# Patient Record
Sex: FEMALE | Race: WHITE | NOT HISPANIC OR LATINO | Employment: OTHER | URBAN - METROPOLITAN AREA
[De-identification: names, ages, dates, MRNs, and addresses within clinical notes are randomized per-mention and may not be internally consistent; named-entity substitution may affect disease eponyms.]

---

## 2017-04-04 ENCOUNTER — TELEPHONE (OUTPATIENT)
Dept: INTERNAL MEDICINE | Facility: CLINIC | Age: 67
End: 2017-04-04

## 2017-04-04 NOTE — TELEPHONE ENCOUNTER
4/4/2017    Call Regarding Preventive Health Screening Colonoscopy, LDL and Physical    Attempt 1    Message on voicemail     Comments:       Outreach   KV

## 2017-05-15 NOTE — TELEPHONE ENCOUNTER
5/15/2017    Call Regarding Preventive Health Screening Colonoscopy, Cervical/PAP and Physical    Attempt 2    Message on voicemail     Comments:           Outreach   DHARMESH

## 2017-05-25 NOTE — TELEPHONE ENCOUNTER
Call Regarding Preventive Health Screening Colonoscopy and physical    Attempt 3    Message on voicemail     Comments:       Outreach   Rafia Wu

## 2019-04-29 ENCOUNTER — OFFICE VISIT (OUTPATIENT)
Dept: PEDIATRICS | Facility: CLINIC | Age: 69
End: 2019-04-29
Payer: COMMERCIAL

## 2019-04-29 VITALS
OXYGEN SATURATION: 100 % | SYSTOLIC BLOOD PRESSURE: 104 MMHG | WEIGHT: 146.2 LBS | HEART RATE: 82 BPM | HEIGHT: 68 IN | BODY MASS INDEX: 22.16 KG/M2 | TEMPERATURE: 97.6 F | DIASTOLIC BLOOD PRESSURE: 76 MMHG

## 2019-04-29 DIAGNOSIS — E11.8 TYPE 2 DIABETES MELLITUS WITH COMPLICATION, WITHOUT LONG-TERM CURRENT USE OF INSULIN (H): Primary | ICD-10-CM

## 2019-04-29 DIAGNOSIS — Z78.0 ASYMPTOMATIC POSTMENOPAUSAL STATUS: ICD-10-CM

## 2019-04-29 DIAGNOSIS — Z12.31 VISIT FOR SCREENING MAMMOGRAM: ICD-10-CM

## 2019-04-29 DIAGNOSIS — Z13.89 SCREENING FOR DIABETIC PERIPHERAL NEUROPATHY: ICD-10-CM

## 2019-04-29 DIAGNOSIS — N89.8 VAGINAL DISCHARGE: ICD-10-CM

## 2019-04-29 DIAGNOSIS — Z12.11 SCREEN FOR COLON CANCER: ICD-10-CM

## 2019-04-29 DIAGNOSIS — N95.2 ATROPHIC VAGINITIS: ICD-10-CM

## 2019-04-29 DIAGNOSIS — Z11.59 NEED FOR HEPATITIS C SCREENING TEST: ICD-10-CM

## 2019-04-29 LAB
ALBUMIN SERPL-MCNC: 3.9 G/DL (ref 3.4–5)
ALBUMIN UR-MCNC: NEGATIVE MG/DL
ALP SERPL-CCNC: 71 U/L (ref 40–150)
ALT SERPL W P-5'-P-CCNC: 16 U/L (ref 0–50)
ANION GAP SERPL CALCULATED.3IONS-SCNC: 10 MMOL/L (ref 3–14)
APPEARANCE UR: CLEAR
AST SERPL W P-5'-P-CCNC: 11 U/L (ref 0–45)
BILIRUB SERPL-MCNC: 0.8 MG/DL (ref 0.2–1.3)
BILIRUB UR QL STRIP: NEGATIVE
BUN SERPL-MCNC: 13 MG/DL (ref 7–30)
CALCIUM SERPL-MCNC: 9.5 MG/DL (ref 8.5–10.1)
CHLORIDE SERPL-SCNC: 103 MMOL/L (ref 94–109)
CHOLEST SERPL-MCNC: 256 MG/DL
CO2 SERPL-SCNC: 23 MMOL/L (ref 20–32)
COLOR UR AUTO: YELLOW
CREAT SERPL-MCNC: 0.51 MG/DL (ref 0.52–1.04)
CREAT UR-MCNC: 54 MG/DL
GFR SERPL CREATININE-BSD FRML MDRD: >90 ML/MIN/{1.73_M2}
GLUCOSE SERPL-MCNC: 337 MG/DL (ref 70–99)
GLUCOSE UR STRIP-MCNC: 500 MG/DL
HBA1C MFR BLD: 14.5 % (ref 0–5.6)
HDLC SERPL-MCNC: 44 MG/DL
HGB UR QL STRIP: NEGATIVE
KETONES UR STRIP-MCNC: >160 MG/DL
LDLC SERPL CALC-MCNC: 171 MG/DL
LEUKOCYTE ESTERASE UR QL STRIP: NEGATIVE
MICROALBUMIN UR-MCNC: 13 MG/L
MICROALBUMIN/CREAT UR: 23.12 MG/G CR (ref 0–25)
NITRATE UR QL: NEGATIVE
NONHDLC SERPL-MCNC: 212 MG/DL
PH UR STRIP: 5 PH (ref 5–7)
POTASSIUM SERPL-SCNC: 4 MMOL/L (ref 3.4–5.3)
PROT SERPL-MCNC: 7.7 G/DL (ref 6.8–8.8)
SODIUM SERPL-SCNC: 136 MMOL/L (ref 133–144)
SOURCE: ABNORMAL
SP GR UR STRIP: 1.01 (ref 1–1.03)
SPECIMEN SOURCE: NORMAL
TRIGL SERPL-MCNC: 206 MG/DL
TSH SERPL DL<=0.005 MIU/L-ACNC: 1.95 MU/L (ref 0.4–4)
UROBILINOGEN UR STRIP-ACNC: 0.2 EU/DL (ref 0.2–1)
WET PREP SPEC: NORMAL

## 2019-04-29 PROCEDURE — 86803 HEPATITIS C AB TEST: CPT | Performed by: INTERNAL MEDICINE

## 2019-04-29 PROCEDURE — 99207 C FOOT EXAM  NO CHARGE: CPT | Performed by: INTERNAL MEDICINE

## 2019-04-29 PROCEDURE — G0439 PPPS, SUBSEQ VISIT: HCPCS | Performed by: INTERNAL MEDICINE

## 2019-04-29 PROCEDURE — 82043 UR ALBUMIN QUANTITATIVE: CPT | Performed by: INTERNAL MEDICINE

## 2019-04-29 PROCEDURE — 83036 HEMOGLOBIN GLYCOSYLATED A1C: CPT | Performed by: INTERNAL MEDICINE

## 2019-04-29 PROCEDURE — 80061 LIPID PANEL: CPT | Performed by: INTERNAL MEDICINE

## 2019-04-29 PROCEDURE — 80053 COMPREHEN METABOLIC PANEL: CPT | Performed by: INTERNAL MEDICINE

## 2019-04-29 PROCEDURE — 81003 URINALYSIS AUTO W/O SCOPE: CPT | Performed by: INTERNAL MEDICINE

## 2019-04-29 PROCEDURE — 87210 SMEAR WET MOUNT SALINE/INK: CPT | Performed by: INTERNAL MEDICINE

## 2019-04-29 PROCEDURE — 84443 ASSAY THYROID STIM HORMONE: CPT | Performed by: INTERNAL MEDICINE

## 2019-04-29 PROCEDURE — 36415 COLL VENOUS BLD VENIPUNCTURE: CPT | Performed by: INTERNAL MEDICINE

## 2019-04-29 PROCEDURE — G0009 ADMIN PNEUMOCOCCAL VACCINE: HCPCS | Performed by: INTERNAL MEDICINE

## 2019-04-29 PROCEDURE — 90670 PCV13 VACCINE IM: CPT | Performed by: INTERNAL MEDICINE

## 2019-04-29 ASSESSMENT — ENCOUNTER SYMPTOMS
PALPITATIONS: 0
PARESTHESIAS: 0
HEMATOCHEZIA: 0
DIARRHEA: 0
SORE THROAT: 0
ARTHRALGIAS: 0
CONSTIPATION: 1
BREAST MASS: 0
FEVER: 0
DIZZINESS: 0
COUGH: 0
JOINT SWELLING: 0
FREQUENCY: 1
ABDOMINAL PAIN: 0
HEARTBURN: 0
DYSURIA: 0
HEMATURIA: 0
NERVOUS/ANXIOUS: 0
MYALGIAS: 1
NAUSEA: 0
WEAKNESS: 0
EYE PAIN: 1
SHORTNESS OF BREATH: 0
CHILLS: 0
HEADACHES: 0

## 2019-04-29 ASSESSMENT — ACTIVITIES OF DAILY LIVING (ADL): CURRENT_FUNCTION: NO ASSISTANCE NEEDED

## 2019-04-29 ASSESSMENT — MIFFLIN-ST. JEOR: SCORE: 1238.72

## 2019-04-29 NOTE — LETTER
[facility]  [address]  [place] [address]                  [phone]   April 30, 2019    Jennifer Presley  [address] [place] [address]      Jennifer,     Your hep C, your thyroid stimulating hormone (TSH), and your electrolytes, liver, and kidney panels looked stable.     But we do need to discuss your diabetes mellitus, as was previously indicated; please set up a follow up appointment for this ASAP.     Hope you're well.     Andrez Soares MD   Internal Medicine and Pediatrics         Results for orders placed or performed in visit on 04/29/19   Hepatitis C Screen Reflex to HCV RNA Quant and Genotype   Result Value Ref Range    Hepatitis C Antibody Nonreactive NR^Nonreactive   COMPREHENSIVE METABOLIC PANEL   Result Value Ref Range    Sodium 136 133 - 144 mmol/L    Potassium 4.0 3.4 - 5.3 mmol/L    Chloride 103 94 - 109 mmol/L    Carbon Dioxide 23 20 - 32 mmol/L    Anion Gap 10 3 - 14 mmol/L    Glucose 337 (H) 70 - 99 mg/dL    Urea Nitrogen 13 7 - 30 mg/dL    Creatinine 0.51 (L) 0.52 - 1.04 mg/dL    GFR Estimate >90 >60 mL/min/[1.73_m2]    GFR Estimate If Black >90 >60 mL/min/[1.73_m2]    Calcium 9.5 8.5 - 10.1 mg/dL    Bilirubin Total 0.8 0.2 - 1.3 mg/dL    Albumin 3.9 3.4 - 5.0 g/dL    Protein Total 7.7 6.8 - 8.8 g/dL    Alkaline Phosphatase 71 40 - 150 U/L    ALT 16 0 - 50 U/L    AST 11 0 - 45 U/L   HEMOGLOBIN A1C   Result Value Ref Range    Hemoglobin A1C 14.5 (H) 0 - 5.6 %   Lipid panel reflex to direct LDL Fasting   Result Value Ref Range    Cholesterol 256 (H) <200 mg/dL    Triglycerides 206 (H) <150 mg/dL    HDL Cholesterol 44 (L) >49 mg/dL    LDL Cholesterol Calculated 171 (H) <100 mg/dL    Non HDL Cholesterol 212 (H) <130 mg/dL   Albumin Random Urine Quantitative with Creat Ratio   Result Value Ref Range    Creatinine Urine 54 mg/dL    Albumin Urine mg/L 13 mg/L    Albumin Urine mg/g Cr 23.12 0 - 25 mg/g Cr   TSH WITH FREE T4 REFLEX   Result  Value Ref Range    TSH 1.95 0.40 - 4.00 mU/L   *UA reflex to Microscopic and Culture ([place] [facility] (except [place] and [place])   Result Value Ref Range    Color Urine Yellow     Appearance Urine Clear     Glucose Urine 500 (A) NEG^Negative mg/dL    Bilirubin Urine Negative NEG^Negative    Ketones Urine >160 (A) NEG^Negative mg/dL    Specific Gravity Urine 1.010 1.003 - 1.035    Blood Urine Negative NEG^Negative    pH Urine 5.0 5.0 - 7.0 pH    Protein Albumin Urine Negative NEG^Negative mg/dL    Urobilinogen Urine 0.2 0.2 - 1.0 EU/dL    Nitrite Urine Negative NEG^Negative    Leukocyte Esterase Urine Negative NEG^Negative    Source Midstream Urine    Wet prep   Result Value Ref Range    Specimen Description Vagina     Wet Prep No Trichomonas seen     Wet Prep No yeast seen     Wet Prep No clue cells seen     Wet Prep Many  WBC'S seen

## 2019-04-29 NOTE — LETTER
[facility]  [address]  [place] [address]                  [phone]   April 29, 2019    Jennifer Presley  [address] [address]      Jennifer,     Your diabetes blood test (A1c) today was 14.5, which is very, very high.  I am quite sure that this is the reason for your weight loss, and we'll need to come up with a plan together next week as to how to approach this.       Besides diet and exercise, there are some good educational folks we can have you see, and some other medicines we might consider.     Hope you're well.     Andrez Soares MD   Internal Medicine and Pediatrics        Results for orders placed or performed in visit on 04/29/19   HEMOGLOBIN A1C   Result Value Ref Range    Hemoglobin A1C 14.5 (H) 0 - 5.6 %   Wet prep   Result Value Ref Range    Specimen Description Vagina     Wet Prep No Trichomonas seen     Wet Prep No yeast seen     Wet Prep No clue cells seen     Wet Prep Many  WBC'S seen

## 2019-04-29 NOTE — PROGRESS NOTES
"SUBJECTIVE:   Jennifer Presley is a 68 year old female who presents for Preventive Visit.    Are you in the first 12 months of your Medicare coverage?  No    Healthy Habits:     In general, how would you rate your overall health?  Good    Frequency of exercise:  1 day/week    Duration of exercise:  Less than 15 minutes    Do you usually eat at least 4 servings of fruit and vegetables a day, include whole grains    & fiber and avoid regularly eating high fat or \"junk\" foods?  No    Taking medications regularly:  Not Applicable    Medication side effects:  Not applicable    Ability to successfully perform activities of daily living:  No assistance needed    Home Safety:  No safety concerns identified    Hearing Impairment:  No hearing concerns    In the past 6 months, have you been bothered by leaking of urine?  No    In general, how would you rate your overall mental or emotional health?  Good      PHQ-2 Total Score: 0    Additional concerns today:  Yes (neuropathy)    Do you feel safe in your environment? Yes    Do you have a Health Care Directive? No: Advance care planning reviewed with patient; information given to patient to review.      Fall risk  Fallen 2 or more times in the past year?: No  Any fall with injury in the past year?: No    Cognitive Screening   1) Repeat 3 items (Leader, Season, Table)    2) Clock draw: NORMAL  3) 3 item recall: Recalls 3 objects  Results: 3 items recalled: COGNITIVE IMPAIRMENT LESS LIKELY    Mini-CogTM Copyright PATRICIA Kaur. Licensed by the author for use in [facility]; reprinted with permission (adan@.[email or web address]). All rights reserved.      Do you have sleep apnea, excessive snoring or daytime drowsiness?: no    Reviewed and updated as needed this visit by clinical staff  Tobacco  Allergies  Meds  Med Hx  Surg Hx  Fam Hx  Soc Hx        Reviewed and updated as needed this visit by Provider        Social History     Tobacco Use     Smoking status: Never Smoker     " Smokeless tobacco: Never Used   Substance Use Topics     Alcohol use: Yes     Alcohol/week: 1.0 oz         Alcohol Use 4/29/2019   Prescreen: >3 drinks/day or >7 drinks/week? No   Prescreen: >3 drinks/day or >7 drinks/week? -     Patient would like to establish care.    Current providers sharing in care for this patient include:   Patient Care Team:  Andrez Soares MD as PCP - General (Internal Medicine)  Andrez Soares MD (Internal Medicine)    The following health maintenance items are reviewed in Epic and correct as of today:  Health Maintenance   Topic Date Due     HEPATITIS C SCREENING  10/04/1969     ZOSTER IMMUNIZATION (1 of 2) 10/04/2001     MAMMO SCREEN Q2 YR (SYSTEM ASSIGNED)  01/04/2010     EYE EXAM Q1 YEAR  12/01/2015     A1C Q3 MO  12/21/2015     FOOT EXAM Q1 YEAR  09/21/2016     CMP Q1 YR  09/21/2016     LIPID MONITORING Q1 YEAR  09/21/2016     MICROALBUMIN Q1 YEAR  09/21/2016     MEDICARE ANNUAL WELLNESS VISIT  10/04/2016     FALL RISK ASSESSMENT  10/04/2016     DEXA SCAN SCREENING (SYSTEM ASSIGNED)  10/04/2016     TSH W/ FREE T4 REFLEX Q2 YEAR  09/21/2017     PAP Q3 YR  04/22/2018     COLONOSCOPY Q3 YR  10/26/2018     PHQ-2  01/01/2019     ADVANCE DIRECTIVE PLANNING Q5 YRS  06/10/2020     DTAP/TDAP/TD IMMUNIZATION (2 - Td) 06/09/2021     MIGRAINE ACTION PLAN  Completed     INFLUENZA VACCINE  Completed     IPV IMMUNIZATION  Aged Out     MENINGITIS IMMUNIZATION  Aged Out     Has been off all meds for last 3 years.      Has been losing meds unintentionally; lost 40 pounds.     Eats 2 meals per day: breakfast then does not eat until 4 PM.     Labs reviewed in EPIC  BP Readings from Last 3 Encounters:   04/29/19 104/76   11/09/15 108/64   09/21/15 102/76    Wt Readings from Last 3 Encounters:   04/29/19 66.3 kg (146 lb 3.2 oz)   11/09/15 78.5 kg (173 lb)   09/21/15 78.2 kg (172 lb 6.4 oz)                  Patient Active Problem List   Diagnosis     Irritable bowel syndrome     Migraine     Other specified  idiopathic peripheral neuropathy     B-complex deficiency     Skin tag     HYPERLIPIDEMIA LDL GOAL <100     Cystocele     Hypertension goal BP (blood pressure) < 140/90     Type 2 diabetes, uncontrolled, with neuropathy (H)     Tubular adenoma of rectum     Past Surgical History:   Procedure Laterality Date     C NONSPECIFIC PROCEDURE  1977    fourth degree laceration, postpartum     HC REMOVE TONSILS/ADENOIDS,<12 Y/O      T & A <13y.o.       Social History     Tobacco Use     Smoking status: Never Smoker     Smokeless tobacco: Never Used   Substance Use Topics     Alcohol use: Yes     Alcohol/week: 1.0 oz     Family History   Problem Relation Age of Onset     C.A.D. Mother         62 with MI     Breast Cancer Mother         cause of death     Cancer Mother          of lung cancer     C.A.D. Brother         multiple stents.     Cancer Father         lung cancer,  age 56     Cancer Maternal Aunt         breast cancer     Cancer Paternal Aunt         breast cancer     Diabetes Paternal Grandmother         diabetes,  in late 30s         Current Outpatient Medications   Medication Sig Dispense Refill     ACE NOT PRESCRIBED, INTENTIONAL, 1 each continuous prn ACE Inhibitor not prescribed due to Refusal by patient 0 each 0     conjugated estrogens (PREMARIN) 0.625 MG/GM vaginal cream Place 1 g vaginally twice a week 30 g 2     No Known Allergies  Recent Labs   Lab Test 09/21/15  0804 06/10/15  0830 04/22/15  0937 13  0835   A1C 11.7* 10.6*  --  9.5*     --  121 97   HDL 32*  --  35* 35*   TRIG 239*  --  167* 301*   ALT 28  --  21 22   CR 0.50* 0.57 0.54 0.58   GFRESTIMATED >90  Non  GFR Calc   >90  Non  GFR Calc   >90  Non  GFR Calc   >90   GFRESTBLACK >90   GFR Calc   >90   GFR Calc   >90   GFR Calc   >90   POTASSIUM 3.4 3.7 3.7 3.5   TSH 1.98  --  1.59  --       Pneumonia Vaccine:Adults age 65+ who  received Pneumovax (PPSV23) at 65 years or older: Should be given PCV13 > 1 year after their most recent PPSV23    Review of Systems   Constitutional: Negative for chills and fever.   HENT: Negative for congestion, ear pain, hearing loss and sore throat.    Eyes: Positive for pain. Negative for visual disturbance.   Respiratory: Negative for cough and shortness of breath.    Cardiovascular: Negative for chest pain, palpitations and peripheral edema.   Gastrointestinal: Positive for constipation. Negative for abdominal pain, diarrhea, heartburn, hematochezia and nausea.   Breasts:  Negative for tenderness, breast mass and discharge.   Genitourinary: Positive for frequency and genital sores. Negative for dysuria, hematuria, pelvic pain, urgency, vaginal bleeding and vaginal discharge.   Musculoskeletal: Positive for myalgias. Negative for arthralgias and joint swelling.   Skin: Negative for rash.   Neurological: Negative for dizziness, weakness, headaches and paresthesias.   Psychiatric/Behavioral: Negative for mood changes. The patient is not nervous/anxious.      CONSTITUTIONAL: NEGATIVE for fever, chills, change in weight  INTEGUMENTARY/SKIN: NEGATIVE for worrisome rashes, moles or lesions  EYES: NEGATIVE for vision changes or irritation  ENT/MOUTH: NEGATIVE for ear, mouth and throat problems  RESP: NEGATIVE for significant cough or SOB  BREAST: NEGATIVE for masses, tenderness or discharge  CV: NEGATIVE for chest pain, palpitations or peripheral edema  GI: NEGATIVE for nausea, abdominal pain, heartburn, or change in bowel habits  : NEGATIVE for frequency, dysuria, or hematuria  MUSCULOSKELETAL: NEGATIVE for significant arthralgias or myalgia  NEURO: NEGATIVE for weakness, dizziness or paresthesias  ENDOCRINE: NEGATIVE for temperature intolerance, skin/hair changes  HEME: NEGATIVE for bleeding problems  PSYCHIATRIC: NEGATIVE for changes in mood or affect    OBJECTIVE:   /76 (BP Location: Right arm, Patient  "Position: Sitting, Cuff Size: Adult Regular)   Pulse 82   Temp 97.6  F (36.4  C) (Oral)   Ht 1.715 m (5' 7.5\")   Wt 66.3 kg (146 lb 3.2 oz)   SpO2 100%   BMI 22.56 kg/m   Estimated body mass index is 22.56 kg/m  as calculated from the following:    Height as of this encounter: 1.715 m (5' 7.5\").    Weight as of this encounter: 66.3 kg (146 lb 3.2 oz).  Physical Exam  GENERAL: healthy, alert and no distress  EYES: Eyes grossly normal to inspection, PERRL and conjunctivae and sclerae normal  HENT: ear canals and TM's normal, nose and mouth without ulcers or lesions  NECK: no adenopathy, no asymmetry, masses, or scars and thyroid normal to palpation  RESP: lungs clear to auscultation - no rales, rhonchi or wheezes  BREAST: normal without masses, tenderness or nipple discharge and no palpable axillary masses or adenopathy  CV: regular rate and rhythm, normal S1 S2, no S3 or S4, no murmur, click or rub, no peripheral edema and peripheral pulses strong  ABDOMEN: soft, nontender, no hepatosplenomegaly, no masses and bowel sounds normal   (female): normal female external genitalia, normal urethral meatus, vaginal mucosa pink, moist, well rugated, and normal cervix/adnexa/uterus without masses or discharge  RECTAL: normal sphincter tone, no rectal masses  MS: no gross musculoskeletal defects noted, no edema  SKIN: no suspicious lesions or rashes  NEURO: Normal strength and tone, mentation intact and speech normal  PSYCH: mentation appears normal, affect normal/bright    Diagnostic Test Results:  none     ASSESSMENT / PLAN:   1. Screen for colon cancer    - GASTROENTEROLOGY ADULT REF PROCEDURE ONLY    2. Asymptomatic postmenopausal status    - DEXA HIP/PELVIS/SPINE - Future; Future    3. Visit for screening mammogram    - MA SCREENING DIGITAL BILAT - Future  (s+30); Future    4. Need for hepatitis C screening test    - Hepatitis C Screen Reflex to HCV RNA Quant and Genotype    5. Screening for diabetic peripheral " "neuropathy    - FOOT EXAM  NO CHARGE [48391.114]    6. Type 2 diabetes mellitus with complication, without long-term current use of insulin (H)  Very likely her weight loss is from uncontrolled diabetes mellitus.   Labs today with follow up in 1 week.   - COMPREHENSIVE METABOLIC PANEL  - HEMOGLOBIN A1C  - Lipid panel reflex to direct LDL Fasting  - Albumin Random Urine Quantitative with Creat Ratio  - TSH WITH FREE T4 REFLEX  - *UA reflex to Microscopic and Culture ([place] and [facility] (except [place] and [place])    7. Atrophic vaginitis  Trial of premarin; to OBGYN if symptoms persist or worsen.   - OB/GYN REFERRAL  - conjugated estrogens (PREMARIN) 0.625 MG/GM vaginal cream; Place 1 g vaginally twice a week  Dispense: 30 g; Refill: 2    8. Vaginal discharge    - Wet prep        COUNSELING:  Reviewed preventive health counseling, as reflected in patient instructions       Regular exercise       Healthy diet/nutrition       Vision screening       Hearing screening       Bladder control       Fall risk prevention       Colon cancer screening       Hepatitis C screening    BP Readings from Last 1 Encounters:   04/29/19 104/76     Estimated body mass index is 22.56 kg/m  as calculated from the following:    Height as of this encounter: 1.715 m (5' 7.5\").    Weight as of this encounter: 66.3 kg (146 lb 3.2 oz).           reports that she has never smoked. She has never used smokeless tobacco.      Appropriate preventive services were discussed with this patient, including applicable screening as appropriate for cardiovascular disease, diabetes, osteopenia/osteoporosis, and glaucoma.  As appropriate for age/gender, discussed screening for colorectal cancer, prostate cancer, breast cancer, and cervical cancer. Checklist reviewing preventive services available has been given to the patient.    Reviewed patients plan of care and provided an AVS. The Basic Care Plan (routine screening as documented in Health " Maintenance) for Jennifer meets the Care Plan requirement. This Care Plan has been established and reviewed with the Patient.    Counseling Resources:  ATP IV Guidelines  Pooled Cohorts Equation Calculator  Breast Cancer Risk Calculator  FRAX Risk Assessment  ICSI Preventive Guidelines  Dietary Guidelines for Americans, 2010  USDA's MyPlate  ASA Prophylaxis  Lung CA Screening    Andrez Soares MD  [facility]    Identified Health Risks:  Answers for HPI/ROS submitted by the patient on 4/29/2019   Annual Exam:  Would you say that in general your health is: : Good  Now thinking about your physical health, which includes physical illness and injury; for how many days during the past 30 days was your physical health not good?: 14  Now thinking about your mental health, which includes stress, depression, and problems with emotions; for how many days during the past 30 days was your mental health not good?: 9  During the past 30 days, for about how many days did poor physical or mental health keep you from doing your usual activities, such as self-care, work, or recreation?: None  Refer     She is at risk for lack of exercise and has been provided with information to increase physical activity for the benefit of her well-being.  The patient was counseled and encouraged to consider modifying their diet and eating habits. She was provided with information on recommended healthy diet options.pr

## 2019-04-29 NOTE — PATIENT INSTRUCTIONS
"Get your shingles vaccine (\"Shingrix\") at our pharmacy downstairs (or at another pharmacy), sometime this year--even if you've already received the old \"Zostavax\" shingles vaccine.  The \"Shingrix\" has better immunity and lasts longer, and is cheaper if you get it directly at a pharmacy.  You should not need an appointment.     You will need a second Shingrix anywhere from 2-6 months later.    Lab work downstairs today.  Directions:  As you walk through the first floor, you'll see (on the right) first the pharmacy, then some bathrooms, then the \"Lab and Imaging\" area. Give them your name at the window there and wait for them to call you.    Mammogram: you can call our clinic at  to set this up, or stop at our desk on the way out.  Or, you can call [facility] at  (business hours) or  (24 hours) to set up your mammogram.  If they have not heard from you, they may call you directly.    Colonoscopy: you can call [phone] to set up your colonoscopy.  If they have not heard from you, they may call you directly.    We can obtain a DEXA scan (bone density test for osteoporosis) as well; our clinic will call.    Call for an OBGYN referral   Patient Education   Personalized Prevention Plan  You are due for the preventive services outlined below.  Your care team is available to assist you in scheduling these services.  If you have already completed any of these items, please share that information with your care team to update in your medical record.  Health Maintenance Due   Topic Date Due     Hepatitis C Screening  10/04/1968     Zoster (Shingles) Vaccine (1 of 2) 10/04/2001     Mammogram - every 2 years  01/04/2010     Eye Exam - yearly  12/01/2015     A1C (Diabetes) Lab - every 3 months  12/21/2015     Diabetic Foot Exam - yearly  09/21/2016     Comprehensive Metabolic Lab - yearly  09/21/2016     Cholesterol Lab - yearly  09/21/2016     Microalbumin Lab - yearly  09/21/2016     " Annual Wellness Visit  10/04/2016     FALL RISK ASSESSMENT  10/04/2016     Bone Density Screening (Dexa)  10/04/2016     Thyroid Function Lab (TSH) - every 2 years  09/21/2017     Pap Smear - every 3 years  04/22/2018     Colonoscopy - every 3 years  10/26/2018     PHQ-2  01/01/2019       Exercise for a Healthier Heart     Exercise with a friend. When activity is fun, you're more likely to stick with it.   You may wonder how you can improve the health of your heart. If you re thinking about exercise, you re on the right track. You don t need to become an athlete, but you do need a certain amount of brisk exercise to help strengthen your heart. If you have been diagnosed with a heart condition, your doctor may recommend exercise to help stabilize your condition. To help make exercise a habit, choose safe, fun activities.  Be sure to check with your healthcare provider before starting an exercise program.   Why exercise?  Exercising regularly offers many healthy rewards. It can help you do all of the following:    Improve your blood cholesterol level to help prevent further heart trouble    Lower your blood pressure to help prevent a stroke or heart attack    Control diabetes, or reduce your risk of getting this disease    Improve your heart and lung function    Reach and maintain a healthy weight    Make your muscles stronger and more limber so you can stay active    Prevent falls and fractures by slowing the loss of bone mass (osteoporosis)    Manage stress better    Reduce your blood pressure    Improve your sense of self and your body image  Exercise tips  Ease into your routine. Set small goals. Then build on them.  Exercise on most days. Aim for a total of 150 or more minutes of moderate to  vigorous intensity activity each week. Consider 40 minutes, 3 to 4 times a week. For best results, activity should last for 40 minutes on average. It is OK to work up to the 40 minute period over time. Examples of  moderate-intensity activity is walking 1 mile in 15 minutes or 30 to 45 minutes of yard work.  Step up your daily activity level. Along with your exercise program, try being more active throughout the day. Walk instead of drive. Do more household tasks or yard work.  Choose one or more activities you enjoy. Walking is one of the easiest things you can do. You can also try swimming, riding a bike, dancing, or taking an exercise class.  Stop exercising and call your doctor if you:    Have chest pain or feel dizzy or lightheaded    Feel burning, tightness, pressure, or heaviness in your chest, neck, shoulders, back, or arms    Have unusual shortness of breath    Have increased joint or muscle pain    Have palpitations or an irregular heartbeat   Date Last Reviewed: 5/1/2016 2000-2018 The [employer]. [address] [place] [address]. All rights reserved. This information is not intended as a substitute for professional medical care. Always follow your healthcare professional's instructions.          Understanding [employer] MyPlate  The USDA (U.S. Department of Agriculture) has guidelines to help you make healthy food choices. These are called MyPlate. MyPlate shows the food groups that make up healthy meals using the image of a place setting. Before you eat, think about the healthiest choices for what to put onto your plate or into your cup or bowl. To learn more about building a healthy plate, visit www.choosemyplate.gov.    The food groups    Fruits. Any fruit or 100% fruit juice counts as part of the Fruit Group. Fruits may be fresh, canned, frozen, or dried, and may be whole, cut-up, or pureed. Make half your plate fruits and vegetables.    Vegetables. Any vegetable or 100% vegetable juice counts as a member of the Vegetable Group. Vegetables may be fresh, frozen, canned, or dried. They can be served raw or cooked and may be whole, cut-up, or mashed. Make half your plate fruits and  vegetables.    Grains. All foods made from grains are part of the Grains Group. These include wheat, rice, oats, cornmeal, and barley such as bread, pasta, oatmeal, cereal, tortillas, and grits. Grains should be no more than a quarter of your plate. At least half of your grains should be whole grains.    Protein. This group includes meat, poultry, seafood, beans and peas, eggs, processed soy products (like tofu), nuts (including nut butters), and seeds. Make protein choices no more than a quarter of your plate. Meat and poultry choices should be lean or low fat.    Dairy. All fluid milk products and foods made from milk that contain calcium, like yogurt and cheese, are part of the Dairy Group. (Foods that have little calcium, such as cream, butter, and cream cheese, are not part of the group.) Most dairy choices should be low-fat or fat-free.    Oils. These are fats that are liquid at room temperature. They include canola, corn, olive, soybean, and sunflower oil. Foods that are mainly oil include mayonnaise, certain salad dressings, and soft margarines. You should have only 5 to 7 teaspoons of oils a day. You probably already get this much from the food you eat.  Date Last Reviewed: 8/1/2017 2000-2018 [employer]. [address] [place] [address]. All rights reserved. This information is not intended as a substitute for professional medical care. Always follow your healthcare professional's instructions.

## 2019-04-30 LAB — HCV AB SERPL QL IA: NONREACTIVE

## 2020-05-03 ENCOUNTER — TELEPHONE (OUTPATIENT)
Dept: PEDIATRICS | Facility: CLINIC | Age: 70
End: 2020-05-03

## 2020-05-03 NOTE — TELEPHONE ENCOUNTER
Panel Management Review      Patient has the following on her problem list:     Diabetes      Summary:    Patient is due/failing the following:   Diabetic eye exam    Action needed:   Needs above.     Type of outreach:    Sent letter.    Questions for provider review:    None                                                                                                                                    ROHIT AL MA on 5/3/2020 at 11:25 AM

## 2020-05-03 NOTE — LETTER
May 3, 2020      Jennifer Presley  [address] [place] [address]        Dear Jennifer,       We care about your health and have reviewed your health plan including your medical conditions, medications, and lab results.  Based on this review, it is recommended that you follow up regarding the following health topic(s):  -Diabetes    We recommend you take the following action(s):  -schedule a diabetic eye exam as soon as able or if you have had an eye exam in the last 12 months please have those records forwarded to our office.     Please call us at the [facility] - [phone] (or use [employer]) to address the above recommendations.     Thank you for trusting [facility] and we appreciate the opportunity to serve you.  We look forward to supporting your healthcare needs in the future.    Healthy Regards,    Your Health Care Team  [facility]

## 2020-11-12 ENCOUNTER — OFFICE VISIT (OUTPATIENT)
Dept: PEDIATRICS | Facility: CLINIC | Age: 70
End: 2020-11-12
Payer: COMMERCIAL

## 2020-11-12 VITALS
OXYGEN SATURATION: 100 % | DIASTOLIC BLOOD PRESSURE: 70 MMHG | WEIGHT: 139 LBS | TEMPERATURE: 97.7 F | BODY MASS INDEX: 21.45 KG/M2 | SYSTOLIC BLOOD PRESSURE: 118 MMHG | HEART RATE: 79 BPM

## 2020-11-12 DIAGNOSIS — M54.2 NECK PAIN: Primary | ICD-10-CM

## 2020-11-12 DIAGNOSIS — M79.10 MYALGIA DUE TO STATIN: ICD-10-CM

## 2020-11-12 DIAGNOSIS — T46.6X5A MYALGIA DUE TO STATIN: ICD-10-CM

## 2020-11-12 PROCEDURE — 99213 OFFICE O/P EST LOW 20 MIN: CPT | Performed by: NURSE PRACTITIONER

## 2020-11-12 RX ORDER — TIZANIDINE 2 MG/1
2 TABLET ORAL 3 TIMES DAILY PRN
Qty: 30 TABLET | Refills: 0 | Status: SHIPPED | OUTPATIENT
Start: 2020-11-12 | End: 2023-01-30

## 2020-11-12 RX ORDER — IBUPROFEN 600 MG/1
600 TABLET, FILM COATED ORAL EVERY 6 HOURS PRN
Qty: 30 TABLET | Refills: 0 | Status: SHIPPED | OUTPATIENT
Start: 2020-11-12 | End: 2023-01-30

## 2020-11-12 NOTE — PATIENT INSTRUCTIONS
Patient Education     Reach and Hold Exercise       Do this exercise on your hands and knees. Keep your knees under your hips and your hands under your shoulders. Keep your spine in a neutral position (not arched or sagging). Keep your ears in line with your shoulders. Hold for a few seconds before starting the exercise:  1. Tighten your belly muscles and raise one arm straight in front of you, palm down. Hold for 5 seconds, then lower. Repeat 5 times.  2. Do the exercise again, this time lifting your arm to the side. Repeat 5 times.  3. Do the exercise again, this time lifting your arm backward, palm up. Repeat 5 times.  Switch sides and do each exercise with the other arm.  [employer] last reviewed this educational content on 11/1/2017 2000-2020 The [employer]. [address] [place] [address]. All rights reserved. This information is not intended as a substitute for professional medical care. Always follow your healthcare professional's instructions.           Patient Education     General Neck and Back Pain    Both neck and back pain are usually caused by injury to the muscles or ligaments of the spine. Sometimes the disks that separate each bone of the spine may cause pain by pressing on a nearby nerve. Back and neck pain may appear after a sudden twisting or bending force (such as in a car accident), or sometimes after a simple awkward movement. In either case, muscle spasm is often present and adds to the pain.   Acute neck and back pain usually gets better in 1 to 2 weeks. Pain related to disk disease, arthritis in the spinal joints, or narrowing of the spinal canal (spinal stenosis) can become chronic and last for months or years.   Back and neck pain are common problems. Most people feel better in 1 or 2 weeks, and most of the rest in 1 to 2 months. Most people can remain active.   People have and describe pain differently.    Pain can be sharp, stabbing, shooting, aching, cramping,  or burning    Movement, standing, bending, lifting, sitting, or walking may worsen the pain    Pain can be limited to one spot or area, or it can be more generalized    Pain can spread upward, downward, to the front, or go down your arms or legs    Muscle spasm may occur.  Most of the time mechanical problems with the muscles or spine cause the pain. It's usually caused by an injury, whether known or not, to the muscles or ligaments. Pain without an injury is not common. But it can sometimes be caused by a health problem such as kidney stones or an infection. Pain is usually related to physical activity such as sports, exercise, work, or normal activity. Sometimes it can occur without an identifiable cause. This can happen simply by stretching or moving wrong, without noting pain at the time. Other causes include:     Overexertion, lifting, pushing, pulling incorrectly or too aggressively.    Sudden twisting, bending or stretching from an accident (car or fall), or accidental movement.    Poor posture    Poor conditioning, lack of regular exercise    Spinal disc disease or arthritis    Stress    Pregnancy, or illness like appendicitis, bladder or kidney infection, pelvic infections   Home care    For neck pain: Use a comfortable pillow that supports the head and keeps the spine in a neutral position. The position of the head should not be tilted forward or backward.    When in bed, try to find a position of comfort. A firm mattress is best. Try lying flat on your back with pillows under your knees. You can also try lying on your side with your knees bent up towards your chest and a pillow between your knees.    At first, don't try to stretch out the sore spots. If there is a strain, it's not like the good soreness you get after exercising without an injury. In this case, stretching may make it worse.    Don't sit for long periods, as in long car rides or other travel. This puts more stress on the lower back than  standing or walking.    During the first 24 to 72 hours after an injury, apply an ice pack to the painful area for 20 minutes and then remove it for 20 minutes over a period of 60 to 90 minutes or several times a day.     You can alternate ice and heat therapies. Talk with your healthcare provider about the best treatment for your back or neck pain. As a safety precaution, don't use a heating pad at bedtime. Sleeping with a heating pad can lead to skin burns or tissue damage.    Therapeutic massage can help relax the back and neck muscles without stretching them.    Be aware of safe lifting methods and don't lift anything over 15 pounds until all the pain is gone.    Medicines  Talk to your healthcare provider before using medicine, especially if you have other medical problems or are taking other medicines.     You may use over-the-counter medicine to control pain, unless another pain medicine was prescribed. Talk with your doctor first if you have chronic conditions like diabetes, liver or kidney disease, stomach ulcers, gastrointestinal bleeding, or are taking blood thinner medicines.    Be careful if you are given pain medicines, narcotics, or medicine for muscle spasm. They can cause drowsiness, and can affect your coordination, reflexes, and judgment. Don't drive or operate heavy machinery.  Follow-up care  Follow up with your healthcare provider, or as advised. You may need physical therapy or further tests.   If X-rays were taken, you will be told of any new findings that may affect your care.   Call 911  Call 911 if any of the following occur:     Trouble breathing    Confusion    Very drowsy or trouble awakening    Fainting or loss of consciousness    Rapid or very slow heart rate    Loss of bowel or bladder control  When to seek medical advice  Call your healthcare provider right away if any of these occur:    Pain becomes worse or spreads into your arms or legs    Weakness, numbness or pain in one or  both arms or legs    Numbness in the groin area    Trouble walking    Fever of 100.4 F (38 C) or higher, or as directed by your healthcare provider  Mark [employer] last reviewed this educational content on 7/1/2016 2000-2020 [employer]. [address] [place] [address]. All rights reserved. This information is not intended as a substitute for professional medical care. Always follow your healthcare professional's instructions.

## 2020-11-12 NOTE — PROGRESS NOTES
Subjective     Jennifer Presley is a 70 year old female who presents to clinic today for the following health issues:    HPI         Neck Pain  Onset/Duration: 2 weeks  Description:   Location: right side  Radiation: none  Intensity: moderate  Progression of Symptoms:  worsening  Accompanying Signs & Symptoms:  Burning, tingling, prickly sensation in arm(s): no  Numbness in arm(s): no  Weakness in arm(s):  no  Fever: no  Headache: no  Nausea and/or vomiting: no  History:   Trauma: no  Previous neck pain: no  Previous surgery or injections: no  Previous Imaging (MRI,X ray): no  Precipitating or alleviating factors: None  Does movement impact the pain:  YES  Therapies tried and outcome: heat and ice; aspirin    Was working at [employer] during the election.  Unsure if it is repetitive motion.    Review of Systems   Constitutional, HEENT, cardiovascular, pulmonary, gi and gu systems are negative, except as otherwise noted.      Objective    /70 (Cuff Size: Adult Regular)   Pulse 79   Temp 97.7  F (36.5  C) (Tympanic)   Wt 63 kg (139 lb)   SpO2 100%   BMI 21.45 kg/m    Body mass index is 21.45 kg/m .       Physical Exam   GENERAL: healthy, alert and no distress  NECK: no adenopathy, no asymmetry, masses, or scars and thyroid normal to palpation  NECK: no adenopathy, no asymmetry, masses, or scars and thyroid normal to palpation  RESP: lungs clear to auscultation - no rales, rhonchi or wheezes  CV: regular rate and rhythm, normal S1 S2, no S3 or S4, no murmur, click or rub, no peripheral edema and peripheral pulses strong  MS: neck exam shows LROM, tenderness to right neck upon palpation.  PSYCH: mentation appears normal, affect normal/bright        Assessment & Plan     1. Neck pain  Patient declines PT, xray, or further imaging  Follow-up as needed  - tiZANidine (ZANAFLEX) 2 MG tablet; Take 1 tablet (2 mg) by mouth 3 times daily as needed for muscle spasms  Dispense: 30 tablet; Refill: 0  - ibuprofen  (ADVIL/MOTRIN) 600 MG tablet; Take 1 tablet (600 mg) by mouth every 6 hours as needed for moderate pain  Dispense: 30 tablet; Refill: 0    2. Myalgia due to statin  Patient declines PT, xray, or further imaging  Follow-up as needed    See Patient Instructions  Return if symptoms worsen or fail to improve.    Ivy Powers NP  [facility]

## 2023-01-30 ENCOUNTER — ANCILLARY PROCEDURE (OUTPATIENT)
Dept: GENERAL RADIOLOGY | Facility: CLINIC | Age: 73
End: 2023-01-30
Attending: FAMILY MEDICINE
Payer: COMMERCIAL

## 2023-01-30 ENCOUNTER — HOSPITAL ENCOUNTER (INPATIENT)
Facility: CLINIC | Age: 73
LOS: 4 days | Discharge: NURSING, REHAB OR LONG-TERM CARE | DRG: 480 | End: 2023-02-03
Attending: EMERGENCY MEDICINE | Admitting: INTERNAL MEDICINE
Payer: COMMERCIAL

## 2023-01-30 ENCOUNTER — APPOINTMENT (OUTPATIENT)
Dept: CT IMAGING | Facility: CLINIC | Age: 73
DRG: 480 | End: 2023-01-30
Attending: EMERGENCY MEDICINE
Payer: COMMERCIAL

## 2023-01-30 ENCOUNTER — NURSE TRIAGE (OUTPATIENT)
Dept: PEDIATRICS | Facility: CLINIC | Age: 73
End: 2023-01-30

## 2023-01-30 ENCOUNTER — OFFICE VISIT (OUTPATIENT)
Dept: URGENT CARE | Facility: URGENT CARE | Age: 73
End: 2023-01-30
Payer: COMMERCIAL

## 2023-01-30 VITALS
OXYGEN SATURATION: 98 % | RESPIRATION RATE: 20 BRPM | TEMPERATURE: 97.8 F | SYSTOLIC BLOOD PRESSURE: 103 MMHG | DIASTOLIC BLOOD PRESSURE: 79 MMHG | HEART RATE: 80 BPM

## 2023-01-30 DIAGNOSIS — S79.911A HIP INJURY, RIGHT, INITIAL ENCOUNTER: ICD-10-CM

## 2023-01-30 DIAGNOSIS — E11.10 DIABETIC KETOACIDOSIS WITHOUT COMA ASSOCIATED WITH TYPE 2 DIABETES MELLITUS (H): ICD-10-CM

## 2023-01-30 DIAGNOSIS — S79.911A HIP INJURY, RIGHT, INITIAL ENCOUNTER: Primary | ICD-10-CM

## 2023-01-30 DIAGNOSIS — S72.001A CLOSED FRACTURE OF NECK OF RIGHT FEMUR, INITIAL ENCOUNTER (H): Primary | ICD-10-CM

## 2023-01-30 LAB
ALBUMIN SERPL BCG-MCNC: 4.6 G/DL (ref 3.5–5.2)
ALP SERPL-CCNC: 76 U/L (ref 35–104)
ALT SERPL W P-5'-P-CCNC: 11 U/L (ref 10–35)
ANION GAP SERPL CALCULATED.3IONS-SCNC: 28 MMOL/L (ref 7–15)
ANION GAP SERPL CALCULATED.3IONS-SCNC: 31 MMOL/L (ref 7–15)
AST SERPL W P-5'-P-CCNC: 25 U/L (ref 10–35)
BASE EXCESS BLDV CALC-SCNC: -12.5 MMOL/L (ref -7.7–1.9)
BASOPHILS # BLD AUTO: 0 10E3/UL (ref 0–0.2)
BASOPHILS NFR BLD AUTO: 0 %
BILIRUB DIRECT SERPL-MCNC: <0.2 MG/DL (ref 0–0.3)
BILIRUB SERPL-MCNC: 0.7 MG/DL
BUN SERPL-MCNC: 24.4 MG/DL (ref 8–23)
BUN SERPL-MCNC: 24.7 MG/DL (ref 8–23)
CALCIUM SERPL-MCNC: 9.5 MG/DL (ref 8.8–10.2)
CALCIUM SERPL-MCNC: 9.7 MG/DL (ref 8.8–10.2)
CHLORIDE SERPL-SCNC: 91 MMOL/L (ref 98–107)
CHLORIDE SERPL-SCNC: 96 MMOL/L (ref 98–107)
CREAT SERPL-MCNC: 0.62 MG/DL (ref 0.51–0.95)
CREAT SERPL-MCNC: 0.62 MG/DL (ref 0.51–0.95)
DEPRECATED HCO3 PLAS-SCNC: 10 MMOL/L (ref 22–29)
DEPRECATED HCO3 PLAS-SCNC: 12 MMOL/L (ref 22–29)
EOSINOPHIL # BLD AUTO: 0 10E3/UL (ref 0–0.7)
EOSINOPHIL NFR BLD AUTO: 0 %
ERYTHROCYTE [DISTWIDTH] IN BLOOD BY AUTOMATED COUNT: 12.7 % (ref 10–15)
GFR SERPL CREATININE-BSD FRML MDRD: >90 ML/MIN/1.73M2
GFR SERPL CREATININE-BSD FRML MDRD: >90 ML/MIN/1.73M2
GLUCOSE BLDC GLUCOMTR-MCNC: 374 MG/DL (ref 70–99)
GLUCOSE BLDC GLUCOMTR-MCNC: 377 MG/DL (ref 70–99)
GLUCOSE SERPL-MCNC: 417 MG/DL (ref 70–99)
GLUCOSE SERPL-MCNC: 457 MG/DL (ref 70–99)
HBA1C MFR BLD: 13.2 %
HCO3 BLDV-SCNC: 14 MMOL/L (ref 21–28)
HCT VFR BLD AUTO: 42.3 % (ref 35–47)
HGB BLD-MCNC: 13.4 G/DL (ref 11.7–15.7)
IMM GRANULOCYTES # BLD: 0 10E3/UL
IMM GRANULOCYTES NFR BLD: 0 %
KETONES BLD-SCNC: 5.7 MMOL/L (ref 0–0.6)
LACTATE SERPL-SCNC: 1.5 MMOL/L (ref 0.7–2)
LIPASE SERPL-CCNC: 14 U/L (ref 13–60)
LYMPHOCYTES # BLD AUTO: 1.3 10E3/UL (ref 0.8–5.3)
LYMPHOCYTES NFR BLD AUTO: 13 %
MAGNESIUM SERPL-MCNC: 2.1 MG/DL (ref 1.7–2.3)
MCH RBC QN AUTO: 32.1 PG (ref 26.5–33)
MCHC RBC AUTO-ENTMCNC: 31.7 G/DL (ref 31.5–36.5)
MCV RBC AUTO: 101 FL (ref 78–100)
MONOCYTES # BLD AUTO: 0.6 10E3/UL (ref 0–1.3)
MONOCYTES NFR BLD AUTO: 6 %
NEUTROPHILS # BLD AUTO: 8.1 10E3/UL (ref 1.6–8.3)
NEUTROPHILS NFR BLD AUTO: 81 %
NRBC # BLD AUTO: 0 10E3/UL
NRBC BLD AUTO-RTO: 0 /100
O2/TOTAL GAS SETTING VFR VENT: 0 %
OXYHGB MFR BLDV: 22 % (ref 70–75)
PCO2 BLDV: 36 MM HG (ref 40–50)
PH BLDV: 7.21 [PH] (ref 7.32–7.43)
PHOSPHATE SERPL-MCNC: 6.1 MG/DL (ref 2.5–4.5)
PLATELET # BLD AUTO: 207 10E3/UL (ref 150–450)
PO2 BLDV: 18 MM HG (ref 25–47)
POTASSIUM SERPL-SCNC: 4.5 MMOL/L (ref 3.4–5.3)
POTASSIUM SERPL-SCNC: 4.5 MMOL/L (ref 3.4–5.3)
PROT SERPL-MCNC: 7.9 G/DL (ref 6.4–8.3)
RBC # BLD AUTO: 4.18 10E6/UL (ref 3.8–5.2)
SODIUM SERPL-SCNC: 132 MMOL/L (ref 136–145)
SODIUM SERPL-SCNC: 136 MMOL/L (ref 136–145)
WBC # BLD AUTO: 10.2 10E3/UL (ref 4–11)

## 2023-01-30 PROCEDURE — 82805 BLOOD GASES W/O2 SATURATION: CPT | Performed by: EMERGENCY MEDICINE

## 2023-01-30 PROCEDURE — 83690 ASSAY OF LIPASE: CPT | Performed by: EMERGENCY MEDICINE

## 2023-01-30 PROCEDURE — 99285 EMERGENCY DEPT VISIT HI MDM: CPT | Mod: 25

## 2023-01-30 PROCEDURE — 82010 KETONE BODYS QUAN: CPT | Performed by: EMERGENCY MEDICINE

## 2023-01-30 PROCEDURE — 83605 ASSAY OF LACTIC ACID: CPT | Performed by: INTERNAL MEDICINE

## 2023-01-30 PROCEDURE — 258N000003 HC RX IP 258 OP 636: Performed by: EMERGENCY MEDICINE

## 2023-01-30 PROCEDURE — 99215 OFFICE O/P EST HI 40 MIN: CPT | Performed by: FAMILY MEDICINE

## 2023-01-30 PROCEDURE — 82248 BILIRUBIN DIRECT: CPT | Performed by: EMERGENCY MEDICINE

## 2023-01-30 PROCEDURE — 120N000001 HC R&B MED SURG/OB

## 2023-01-30 PROCEDURE — 99223 1ST HOSP IP/OBS HIGH 75: CPT | Mod: AI | Performed by: PHYSICIAN ASSISTANT

## 2023-01-30 PROCEDURE — 82962 GLUCOSE BLOOD TEST: CPT

## 2023-01-30 PROCEDURE — 36415 COLL VENOUS BLD VENIPUNCTURE: CPT | Performed by: EMERGENCY MEDICINE

## 2023-01-30 PROCEDURE — 84100 ASSAY OF PHOSPHORUS: CPT | Performed by: EMERGENCY MEDICINE

## 2023-01-30 PROCEDURE — 83036 HEMOGLOBIN GLYCOSYLATED A1C: CPT | Performed by: EMERGENCY MEDICINE

## 2023-01-30 PROCEDURE — 84520 ASSAY OF UREA NITROGEN: CPT | Performed by: EMERGENCY MEDICINE

## 2023-01-30 PROCEDURE — 85049 AUTOMATED PLATELET COUNT: CPT | Performed by: EMERGENCY MEDICINE

## 2023-01-30 PROCEDURE — 73502 X-RAY EXAM HIP UNI 2-3 VIEWS: CPT | Mod: TC | Performed by: RADIOLOGY

## 2023-01-30 PROCEDURE — 83735 ASSAY OF MAGNESIUM: CPT | Performed by: EMERGENCY MEDICINE

## 2023-01-30 PROCEDURE — 250N000009 HC RX 250: Performed by: EMERGENCY MEDICINE

## 2023-01-30 PROCEDURE — 73700 CT LOWER EXTREMITY W/O DYE: CPT | Mod: RT

## 2023-01-30 RX ORDER — AMOXICILLIN 250 MG
2 CAPSULE ORAL 2 TIMES DAILY PRN
Status: DISCONTINUED | OUTPATIENT
Start: 2023-01-30 | End: 2023-02-03 | Stop reason: HOSPADM

## 2023-01-30 RX ORDER — ONDANSETRON 4 MG/1
4 TABLET, ORALLY DISINTEGRATING ORAL EVERY 6 HOURS PRN
Status: DISCONTINUED | OUTPATIENT
Start: 2023-01-30 | End: 2023-01-31

## 2023-01-30 RX ORDER — ACETAMINOPHEN 325 MG/1
975 TABLET ORAL EVERY 8 HOURS
Status: DISCONTINUED | OUTPATIENT
Start: 2023-01-30 | End: 2023-01-31

## 2023-01-30 RX ORDER — ONDANSETRON 2 MG/ML
4 INJECTION INTRAMUSCULAR; INTRAVENOUS EVERY 6 HOURS PRN
Status: DISCONTINUED | OUTPATIENT
Start: 2023-01-30 | End: 2023-01-31

## 2023-01-30 RX ORDER — HYDROMORPHONE HCL IN WATER/PF 6 MG/30 ML
0.2 PATIENT CONTROLLED ANALGESIA SYRINGE INTRAVENOUS
Status: DISCONTINUED | OUTPATIENT
Start: 2023-01-30 | End: 2023-01-31

## 2023-01-30 RX ORDER — LIDOCAINE 40 MG/G
CREAM TOPICAL
Status: DISCONTINUED | OUTPATIENT
Start: 2023-01-30 | End: 2023-01-31

## 2023-01-30 RX ORDER — DEXTROSE MONOHYDRATE, SODIUM CHLORIDE, AND POTASSIUM CHLORIDE 50; 1.49; 4.5 G/1000ML; G/1000ML; G/1000ML
INJECTION, SOLUTION INTRAVENOUS CONTINUOUS
Status: DISCONTINUED | OUTPATIENT
Start: 2023-01-30 | End: 2023-01-31

## 2023-01-30 RX ORDER — SODIUM CHLORIDE AND POTASSIUM CHLORIDE 150; 450 MG/100ML; MG/100ML
INJECTION, SOLUTION INTRAVENOUS CONTINUOUS
Status: DISCONTINUED | OUTPATIENT
Start: 2023-01-30 | End: 2023-01-31

## 2023-01-30 RX ORDER — NITROGLYCERIN 0.4 MG/1
0.4 TABLET SUBLINGUAL EVERY 5 MIN PRN
Status: DISCONTINUED | OUTPATIENT
Start: 2023-01-30 | End: 2023-02-03 | Stop reason: HOSPADM

## 2023-01-30 RX ORDER — AMOXICILLIN 250 MG
1 CAPSULE ORAL 2 TIMES DAILY PRN
Status: DISCONTINUED | OUTPATIENT
Start: 2023-01-30 | End: 2023-02-03 | Stop reason: HOSPADM

## 2023-01-30 RX ORDER — LANOLIN ALCOHOL/MO/W.PET/CERES
3 CREAM (GRAM) TOPICAL
Status: DISCONTINUED | OUTPATIENT
Start: 2023-01-30 | End: 2023-02-03 | Stop reason: HOSPADM

## 2023-01-30 RX ORDER — DEXTROSE MONOHYDRATE 25 G/50ML
25-50 INJECTION, SOLUTION INTRAVENOUS
Status: DISCONTINUED | OUTPATIENT
Start: 2023-01-30 | End: 2023-01-31

## 2023-01-30 RX ORDER — NICOTINE POLACRILEX 4 MG
15-30 LOZENGE BUCCAL
Status: DISCONTINUED | OUTPATIENT
Start: 2023-01-30 | End: 2023-01-31

## 2023-01-30 RX ADMIN — Medication 5 UNITS/HR: at 21:51

## 2023-01-30 RX ADMIN — SODIUM CHLORIDE 1000 ML: 9 INJECTION, SOLUTION INTRAVENOUS at 21:21

## 2023-01-30 ASSESSMENT — ENCOUNTER SYMPTOMS
RHINORRHEA: 1
NECK PAIN: 1
NUMBNESS: 0
ARTHRALGIAS: 1
WEAKNESS: 0
COUGH: 1

## 2023-01-30 ASSESSMENT — ACTIVITIES OF DAILY LIVING (ADL)
ADLS_ACUITY_SCORE: 35
ADLS_ACUITY_SCORE: 35

## 2023-01-30 NOTE — TELEPHONE ENCOUNTER
"Recommended she ask about getting a walker to help with mobility as a step stool is not ideal clearly.    Reason for Disposition    MODERATE weakness (i.e., interferes with work, school, normal activities) and new-onset or worsening    Answer Assessment - Initial Assessment Questions  1. MECHANISM: \"How did the fall happen?\"      She was outside, picking up a package and lost her balance and fell backwards, hit the storm door which helped break her fall.    2. DOMESTIC VIOLENCE AND ELDER ABUSE SCREENING: \"Did you fall because someone pushed you or tried to hurt you?\" If Yes, ask: \"Are you safe now?\"      NA  3. ONSET: \"When did the fall happen?\" (e.g., minutes, hours, or days ago)      Yesterday afternoon  4. LOCATION: \"What part of the body hit the ground?\" (e.g., back, buttocks, head, hips, knees, hands, head, stomach)      Thinks it was her right hip, she thinks she heard something pop. Pain is in her groin and outer part of hip  5. INJURY: \"Did you hurt (injure) yourself when you fell?\" If Yes, ask: \"What did you injure? Tell me more about this?\" (e.g., body area; type of injury; pain severity)\"      hip  6. PAIN: \"Is there any pain?\" If Yes, ask: \"How bad is the pain?\" (e.g., Scale 1-10; or mild,   moderate, severe)    - NONE (0): No pain    - MILD (1-3): Doesn't interfere with normal activities     - MODERATE (4-7): Interferes with normal activities or awakens from sleep     - SEVERE (8-10): Excruciating pain, unable to do any normal activities       Moderate  7. SIZE: For cuts, bruises, or swelling, ask: \"How large is it?\" (e.g., inches or centimeters)       No swelling or bruising that she can see, used a cold pack, took ibuprofen last night  8. PREGNANCY: \"Is there any chance you are pregnant?\" \"When was your last menstrual period?\"      NA  9. OTHER SYMPTOMS: \"Do you have any other symptoms?\" (e.g., dizziness, fever, weakness; new onset or worsening).       No other symptoms except the pain in hip  10. " "CAUSE: \"What do you think caused the fall (or falling)?\" (e.g., tripped, dizzy spell)        Lost balance    Protocols used: FALLS AND [id number] Luo RN on 1/30/2023 at 10:35 AM    "

## 2023-01-30 NOTE — PATIENT INSTRUCTIONS
All discharge instructions including discharge meds and follow up reviewed with patient. Verbalized understanding. IV removed with catheter intact. Patient ambulated out of ED in no apparent distress. [facility] Emergency Department      Address: [address], [place] [address]    Phone: [phone]

## 2023-01-30 NOTE — ED TRIAGE NOTES
Referred here for xray results stating: Minimally displaced subcapital right femoral neck fracture suspected.    Patient states she was sent here for CT.     Mechanical ground level fall yesterday. Unable to walk on it today.

## 2023-01-30 NOTE — PROGRESS NOTES
Assessment & Plan     Hip injury, right, initial encounter  - XR Hip Right 2-3 Views     Suspect valgus impacted fracture given the risk factors, presentation I feel she warrants ED eval for consideration of CT imaging to confirm -- if so, would likely need surgery to repair/stabilize. Her friend will take her to [facility] right away. Discussed case with Dr. Marino.     50 minutes spent on the date of the encounter doing chart review, history and exam, documentation and further activities per the note.     Fox Hebert MD   [facility]    Alfonso Rodriguez is a 72 year old female who presents to clinic today for the following health issues:  Chief Complaint   Patient presents with     Musculoskeletal Problem     Pf fall yesterday R hip pain pt cant walk because of the pain      HPI    Patient is accompanied by her neighbor today for evaluation of right hip pain after a fall yesterday at home she was outside over ice when she leaned over to  a package.  She did not hurt her upper extremity including her wrist hand, elbow or shoulder.  Denies any head or neck injuries.  Took a dose of Tylenol and ibuprofen in the last day to help with her pain.  She has been able to bear weight but has discomfort primarily in the groin area and sometimes in the lateral hip.    Patient Active Problem List    Diagnosis Date Noted     Tubular adenoma of rectum 11/06/2015     Priority: Medium     Advanced adenoma size 16-20 mm. Repeat colonoscopy in 3 years.        Type 2 diabetes, uncontrolled, with neuropathy 09/21/2015     Priority: Medium     Hypertension goal BP (blood pressure) < 140/90 01/06/2014     Priority: Medium     Cystocele 04/10/2012     Priority: Medium     HYPERLIPIDEMIA LDL GOAL <100 02/10/2010     Priority: Medium     Skin tag 04/28/2009     Priority: Medium     Other specified idiopathic peripheral neuropathy 02/28/2008     Priority: Medium     B-complex deficiency 02/28/2008     Priority:  Medium     Problem list name updated by automated process. Provider to review       Irritable bowel syndrome 03/06/2007     Priority: Medium     Migraine 03/06/2007     Priority: Medium     Problem list name updated by automated process. Provider to review       Current Outpatient Medications   Medication     ACE NOT PRESCRIBED, INTENTIONAL,     ibuprofen (ADVIL/MOTRIN) 600 MG tablet     tiZANidine (ZANAFLEX) 2 MG tablet     No current facility-administered medications for this visit.         Objective    /79   Pulse 80   Temp 97.8  F (36.6  C) (Tympanic)   Resp 20   SpO2 98%   Physical Exam     R hip: limited movement in flexion/external/internal rotation, pain primarily in groin  GEN: in wheelchair  Results for orders placed or performed in visit on 01/30/23   XR Hip Right 2-3 Views     Status: None    Narrative    EXAM: HIP RIGHT TWO TO THREE VIEWS  DATE/TIME: 1/30/2023 1:06 PM    INDICATION: Fall right side, yesterday. No prior fracture. Hip injury,  right, initial encounter.    COMPARISON: None available.       Impression    IMPRESSION: Minimally displaced subcapital right femoral neck fracture  suspected. Right hip MRI could confirm. Mild right hip osteoarthritis.  No displaced right pelvic fracture is seen. Mild arthritis at the  symphysis pubis.    POLLY CAMPOVERDE MD         SYSTEM ID:  [id number]           The use of Dragon/[employer] dictation services may have been used to construct the content in this note; any grammatical or spelling errors are non-intentional. Please contact the author of this note directly if you are in need of any clarification.

## 2023-01-31 ENCOUNTER — ANESTHESIA EVENT (OUTPATIENT)
Dept: SURGERY | Facility: CLINIC | Age: 73
DRG: 480 | End: 2023-01-31
Payer: COMMERCIAL

## 2023-01-31 ENCOUNTER — APPOINTMENT (OUTPATIENT)
Dept: GENERAL RADIOLOGY | Facility: CLINIC | Age: 73
DRG: 480 | End: 2023-01-31
Attending: STUDENT IN AN ORGANIZED HEALTH CARE EDUCATION/TRAINING PROGRAM
Payer: COMMERCIAL

## 2023-01-31 ENCOUNTER — ANESTHESIA (OUTPATIENT)
Dept: SURGERY | Facility: CLINIC | Age: 73
DRG: 480 | End: 2023-01-31
Payer: COMMERCIAL

## 2023-01-31 LAB
ANION GAP SERPL CALCULATED.3IONS-SCNC: 16 MMOL/L (ref 7–15)
ANION GAP SERPL CALCULATED.3IONS-SCNC: 20 MMOL/L (ref 7–15)
ANION GAP SERPL CALCULATED.3IONS-SCNC: 20 MMOL/L (ref 7–15)
ANION GAP SERPL CALCULATED.3IONS-SCNC: 22 MMOL/L (ref 7–15)
ATRIAL RATE - MUSE: 97 BPM
BASE EXCESS BLDV CALC-SCNC: -5.9 MMOL/L (ref -7.7–1.9)
BASOPHILS # BLD AUTO: 0 10E3/UL (ref 0–0.2)
BASOPHILS NFR BLD AUTO: 0 %
BUN SERPL-MCNC: 24.1 MG/DL (ref 8–23)
BUN SERPL-MCNC: 26.5 MG/DL (ref 8–23)
BUN SERPL-MCNC: 27.1 MG/DL (ref 8–23)
BUN SERPL-MCNC: 27.6 MG/DL (ref 8–23)
CALCIUM SERPL-MCNC: 8.5 MG/DL (ref 8.8–10.2)
CALCIUM SERPL-MCNC: 8.9 MG/DL (ref 8.8–10.2)
CALCIUM SERPL-MCNC: 9 MG/DL (ref 8.8–10.2)
CALCIUM SERPL-MCNC: 9.5 MG/DL (ref 8.8–10.2)
CHLORIDE SERPL-SCNC: 101 MMOL/L (ref 98–107)
CHLORIDE SERPL-SCNC: 103 MMOL/L (ref 98–107)
CHLORIDE SERPL-SCNC: 104 MMOL/L (ref 98–107)
CHLORIDE SERPL-SCNC: 104 MMOL/L (ref 98–107)
CHOLEST SERPL-MCNC: 202 MG/DL
CREAT SERPL-MCNC: 0.51 MG/DL (ref 0.51–0.95)
CREAT SERPL-MCNC: 0.52 MG/DL (ref 0.51–0.95)
CREAT SERPL-MCNC: 0.59 MG/DL (ref 0.51–0.95)
CREAT SERPL-MCNC: 0.6 MG/DL (ref 0.51–0.95)
DEPRECATED HCO3 PLAS-SCNC: 14 MMOL/L (ref 22–29)
DEPRECATED HCO3 PLAS-SCNC: 15 MMOL/L (ref 22–29)
DEPRECATED HCO3 PLAS-SCNC: 15 MMOL/L (ref 22–29)
DEPRECATED HCO3 PLAS-SCNC: 19 MMOL/L (ref 22–29)
DIASTOLIC BLOOD PRESSURE - MUSE: NORMAL MMHG
EOSINOPHIL # BLD AUTO: 0.1 10E3/UL (ref 0–0.7)
EOSINOPHIL NFR BLD AUTO: 1 %
ERYTHROCYTE [DISTWIDTH] IN BLOOD BY AUTOMATED COUNT: 12.7 % (ref 10–15)
GFR SERPL CREATININE-BSD FRML MDRD: >90 ML/MIN/1.73M2
GLUCOSE BLDC GLUCOMTR-MCNC: 144 MG/DL (ref 70–99)
GLUCOSE BLDC GLUCOMTR-MCNC: 145 MG/DL (ref 70–99)
GLUCOSE BLDC GLUCOMTR-MCNC: 153 MG/DL (ref 70–99)
GLUCOSE BLDC GLUCOMTR-MCNC: 167 MG/DL (ref 70–99)
GLUCOSE BLDC GLUCOMTR-MCNC: 186 MG/DL (ref 70–99)
GLUCOSE BLDC GLUCOMTR-MCNC: 196 MG/DL (ref 70–99)
GLUCOSE BLDC GLUCOMTR-MCNC: 200 MG/DL (ref 70–99)
GLUCOSE BLDC GLUCOMTR-MCNC: 201 MG/DL (ref 70–99)
GLUCOSE BLDC GLUCOMTR-MCNC: 213 MG/DL (ref 70–99)
GLUCOSE BLDC GLUCOMTR-MCNC: 215 MG/DL (ref 70–99)
GLUCOSE BLDC GLUCOMTR-MCNC: 215 MG/DL (ref 70–99)
GLUCOSE BLDC GLUCOMTR-MCNC: 216 MG/DL (ref 70–99)
GLUCOSE BLDC GLUCOMTR-MCNC: 231 MG/DL (ref 70–99)
GLUCOSE BLDC GLUCOMTR-MCNC: 232 MG/DL (ref 70–99)
GLUCOSE BLDC GLUCOMTR-MCNC: 236 MG/DL (ref 70–99)
GLUCOSE BLDC GLUCOMTR-MCNC: 299 MG/DL (ref 70–99)
GLUCOSE BLDC GLUCOMTR-MCNC: 99 MG/DL (ref 70–99)
GLUCOSE SERPL-MCNC: 147 MG/DL (ref 70–99)
GLUCOSE SERPL-MCNC: 165 MG/DL (ref 70–99)
GLUCOSE SERPL-MCNC: 306 MG/DL (ref 70–99)
GLUCOSE SERPL-MCNC: 308 MG/DL (ref 70–99)
HCO3 BLDV-SCNC: 20 MMOL/L (ref 21–28)
HCT VFR BLD AUTO: 37 % (ref 35–47)
HDLC SERPL-MCNC: 58 MG/DL
HGB BLD-MCNC: 12.2 G/DL (ref 11.7–15.7)
IMM GRANULOCYTES # BLD: 0 10E3/UL
IMM GRANULOCYTES NFR BLD: 0 %
INTERPRETATION ECG - MUSE: NORMAL
LDLC SERPL CALC-MCNC: 130 MG/DL
LYMPHOCYTES # BLD AUTO: 1.9 10E3/UL (ref 0.8–5.3)
LYMPHOCYTES NFR BLD AUTO: 23 %
MCH RBC QN AUTO: 32 PG (ref 26.5–33)
MCHC RBC AUTO-ENTMCNC: 33 G/DL (ref 31.5–36.5)
MCV RBC AUTO: 97 FL (ref 78–100)
MONOCYTES # BLD AUTO: 0.5 10E3/UL (ref 0–1.3)
MONOCYTES NFR BLD AUTO: 7 %
NEUTROPHILS # BLD AUTO: 5.5 10E3/UL (ref 1.6–8.3)
NEUTROPHILS NFR BLD AUTO: 69 %
NONHDLC SERPL-MCNC: 144 MG/DL
NRBC # BLD AUTO: 0 10E3/UL
NRBC BLD AUTO-RTO: 0 /100
O2/TOTAL GAS SETTING VFR VENT: 0 %
P AXIS - MUSE: 76 DEGREES
PCO2 BLDV: 39 MM HG (ref 40–50)
PH BLDV: 7.31 [PH] (ref 7.32–7.43)
PHOSPHATE SERPL-MCNC: 2.9 MG/DL (ref 2.5–4.5)
PLATELET # BLD AUTO: 194 10E3/UL (ref 150–450)
PO2 BLDV: 44 MM HG (ref 25–47)
POTASSIUM SERPL-SCNC: 3.9 MMOL/L (ref 3.4–5.3)
POTASSIUM SERPL-SCNC: 4 MMOL/L (ref 3.4–5.3)
POTASSIUM SERPL-SCNC: 4.4 MMOL/L (ref 3.4–5.3)
POTASSIUM SERPL-SCNC: 4.8 MMOL/L (ref 3.4–5.3)
PR INTERVAL - MUSE: 138 MS
QRS DURATION - MUSE: 86 MS
QT - MUSE: 370 MS
QTC - MUSE: 469 MS
R AXIS - MUSE: 57 DEGREES
RBC # BLD AUTO: 3.81 10E6/UL (ref 3.8–5.2)
SODIUM SERPL-SCNC: 136 MMOL/L (ref 136–145)
SODIUM SERPL-SCNC: 139 MMOL/L (ref 136–145)
SYSTOLIC BLOOD PRESSURE - MUSE: NORMAL MMHG
T AXIS - MUSE: 91 DEGREES
TRIGL SERPL-MCNC: 68 MG/DL
VENTRICULAR RATE- MUSE: 97 BPM
WBC # BLD AUTO: 8.1 10E3/UL (ref 4–11)

## 2023-01-31 PROCEDURE — 370N000017 HC ANESTHESIA TECHNICAL FEE, PER MIN: Performed by: STUDENT IN AN ORGANIZED HEALTH CARE EDUCATION/TRAINING PROGRAM

## 2023-01-31 PROCEDURE — 120N000001 HC R&B MED SURG/OB

## 2023-01-31 PROCEDURE — 258N000003 HC RX IP 258 OP 636: Performed by: HOSPITALIST

## 2023-01-31 PROCEDURE — 250N000011 HC RX IP 250 OP 636: Performed by: STUDENT IN AN ORGANIZED HEALTH CARE EDUCATION/TRAINING PROGRAM

## 2023-01-31 PROCEDURE — 36415 COLL VENOUS BLD VENIPUNCTURE: CPT | Performed by: STUDENT IN AN ORGANIZED HEALTH CARE EDUCATION/TRAINING PROGRAM

## 2023-01-31 PROCEDURE — 250N000009 HC RX 250: Performed by: STUDENT IN AN ORGANIZED HEALTH CARE EDUCATION/TRAINING PROGRAM

## 2023-01-31 PROCEDURE — 258N000003 HC RX IP 258 OP 636: Performed by: STUDENT IN AN ORGANIZED HEALTH CARE EDUCATION/TRAINING PROGRAM

## 2023-01-31 PROCEDURE — 82803 BLOOD GASES ANY COMBINATION: CPT | Performed by: PHYSICIAN ASSISTANT

## 2023-01-31 PROCEDURE — 710N000009 HC RECOVERY PHASE 1, LEVEL 1, PER MIN: Performed by: STUDENT IN AN ORGANIZED HEALTH CARE EDUCATION/TRAINING PROGRAM

## 2023-01-31 PROCEDURE — 999N000141 HC STATISTIC PRE-PROCEDURE NURSING ASSESSMENT: Performed by: STUDENT IN AN ORGANIZED HEALTH CARE EDUCATION/TRAINING PROGRAM

## 2023-01-31 PROCEDURE — 360N000084 HC SURGERY LEVEL 4 W/ FLUORO, PER MIN: Performed by: STUDENT IN AN ORGANIZED HEALTH CARE EDUCATION/TRAINING PROGRAM

## 2023-01-31 PROCEDURE — 250N000009 HC RX 250: Performed by: NURSE ANESTHETIST, CERTIFIED REGISTERED

## 2023-01-31 PROCEDURE — 250N000011 HC RX IP 250 OP 636: Performed by: PHYSICIAN ASSISTANT

## 2023-01-31 PROCEDURE — C1713 ANCHOR/SCREW BN/BN,TIS/BN: HCPCS | Performed by: STUDENT IN AN ORGANIZED HEALTH CARE EDUCATION/TRAINING PROGRAM

## 2023-01-31 PROCEDURE — 250N000013 HC RX MED GY IP 250 OP 250 PS 637: Performed by: PHYSICIAN ASSISTANT

## 2023-01-31 PROCEDURE — C1769 GUIDE WIRE: HCPCS | Performed by: STUDENT IN AN ORGANIZED HEALTH CARE EDUCATION/TRAINING PROGRAM

## 2023-01-31 PROCEDURE — 85025 COMPLETE CBC W/AUTO DIFF WBC: CPT | Performed by: INTERNAL MEDICINE

## 2023-01-31 PROCEDURE — 250N000025 HC SEVOFLURANE, PER MIN: Performed by: STUDENT IN AN ORGANIZED HEALTH CARE EDUCATION/TRAINING PROGRAM

## 2023-01-31 PROCEDURE — 999N000065 XR PELVIS AND HIP PORTABLE RIGHT 1 VIEW

## 2023-01-31 PROCEDURE — 36415 COLL VENOUS BLD VENIPUNCTURE: CPT | Performed by: PHYSICIAN ASSISTANT

## 2023-01-31 PROCEDURE — 0QS636Z REPOSITION RIGHT UPPER FEMUR WITH INTRAMEDULLARY INTERNAL FIXATION DEVICE, PERCUTANEOUS APPROACH: ICD-10-PCS | Performed by: STUDENT IN AN ORGANIZED HEALTH CARE EDUCATION/TRAINING PROGRAM

## 2023-01-31 PROCEDURE — 250N000012 HC RX MED GY IP 250 OP 636 PS 637: Performed by: INTERNAL MEDICINE

## 2023-01-31 PROCEDURE — 82310 ASSAY OF CALCIUM: CPT | Performed by: STUDENT IN AN ORGANIZED HEALTH CARE EDUCATION/TRAINING PROGRAM

## 2023-01-31 PROCEDURE — 258N000003 HC RX IP 258 OP 636: Performed by: ANESTHESIOLOGY

## 2023-01-31 PROCEDURE — 82962 GLUCOSE BLOOD TEST: CPT

## 2023-01-31 PROCEDURE — 272N000001 HC OR GENERAL SUPPLY STERILE: Performed by: STUDENT IN AN ORGANIZED HEALTH CARE EDUCATION/TRAINING PROGRAM

## 2023-01-31 PROCEDURE — 80048 BASIC METABOLIC PNL TOTAL CA: CPT | Performed by: PHYSICIAN ASSISTANT

## 2023-01-31 PROCEDURE — 80061 LIPID PANEL: CPT | Performed by: PHYSICIAN ASSISTANT

## 2023-01-31 PROCEDURE — 99232 SBSQ HOSP IP/OBS MODERATE 35: CPT | Performed by: INTERNAL MEDICINE

## 2023-01-31 PROCEDURE — 84100 ASSAY OF PHOSPHORUS: CPT | Performed by: PHYSICIAN ASSISTANT

## 2023-01-31 PROCEDURE — 250N000009 HC RX 250: Performed by: EMERGENCY MEDICINE

## 2023-01-31 PROCEDURE — 250N000013 HC RX MED GY IP 250 OP 250 PS 637: Performed by: STUDENT IN AN ORGANIZED HEALTH CARE EDUCATION/TRAINING PROGRAM

## 2023-01-31 PROCEDURE — 258N000003 HC RX IP 258 OP 636: Performed by: NURSE ANESTHETIST, CERTIFIED REGISTERED

## 2023-01-31 PROCEDURE — 250N000011 HC RX IP 250 OP 636: Performed by: NURSE ANESTHETIST, CERTIFIED REGISTERED

## 2023-01-31 PROCEDURE — 999N000179 XR SURGERY CARM FLUORO LESS THAN 5 MIN W STILLS: Mod: TC

## 2023-01-31 DEVICE — IMPLANTABLE DEVICE: Type: IMPLANTABLE DEVICE | Site: HIP | Status: FUNCTIONAL

## 2023-01-31 DEVICE — IMP WASHER SYN 13.0MM TI 419.99: Type: IMPLANTABLE DEVICE | Site: HIP | Status: FUNCTIONAL

## 2023-01-31 RX ORDER — ACETAMINOPHEN 325 MG/1
975 TABLET ORAL EVERY 8 HOURS
Status: COMPLETED | OUTPATIENT
Start: 2023-01-31 | End: 2023-02-03

## 2023-01-31 RX ORDER — ONDANSETRON 2 MG/ML
4 INJECTION INTRAMUSCULAR; INTRAVENOUS EVERY 6 HOURS PRN
Status: DISCONTINUED | OUTPATIENT
Start: 2023-01-31 | End: 2023-02-03 | Stop reason: HOSPADM

## 2023-01-31 RX ORDER — MEPERIDINE HYDROCHLORIDE 25 MG/ML
12.5 INJECTION INTRAMUSCULAR; INTRAVENOUS; SUBCUTANEOUS EVERY 5 MIN PRN
Status: DISCONTINUED | OUTPATIENT
Start: 2023-01-31 | End: 2023-01-31 | Stop reason: HOSPADM

## 2023-01-31 RX ORDER — FENTANYL CITRATE 50 UG/ML
INJECTION, SOLUTION INTRAMUSCULAR; INTRAVENOUS PRN
Status: DISCONTINUED | OUTPATIENT
Start: 2023-01-31 | End: 2023-01-31

## 2023-01-31 RX ORDER — OXYCODONE HYDROCHLORIDE 5 MG/1
5 TABLET ORAL EVERY 4 HOURS PRN
Status: DISCONTINUED | OUTPATIENT
Start: 2023-01-31 | End: 2023-02-03 | Stop reason: HOSPADM

## 2023-01-31 RX ORDER — ONDANSETRON 4 MG/1
4 TABLET, ORALLY DISINTEGRATING ORAL EVERY 30 MIN PRN
Status: DISCONTINUED | OUTPATIENT
Start: 2023-01-31 | End: 2023-01-31 | Stop reason: HOSPADM

## 2023-01-31 RX ORDER — FENTANYL CITRATE 50 UG/ML
25 INJECTION, SOLUTION INTRAMUSCULAR; INTRAVENOUS EVERY 5 MIN PRN
Status: DISCONTINUED | OUTPATIENT
Start: 2023-01-31 | End: 2023-01-31 | Stop reason: HOSPADM

## 2023-01-31 RX ORDER — GLYCOPYRROLATE 0.2 MG/ML
INJECTION, SOLUTION INTRAMUSCULAR; INTRAVENOUS PRN
Status: DISCONTINUED | OUTPATIENT
Start: 2023-01-31 | End: 2023-01-31

## 2023-01-31 RX ORDER — ALBUTEROL SULFATE 0.83 MG/ML
2.5 SOLUTION RESPIRATORY (INHALATION) EVERY 4 HOURS PRN
Status: DISCONTINUED | OUTPATIENT
Start: 2023-01-31 | End: 2023-01-31 | Stop reason: HOSPADM

## 2023-01-31 RX ORDER — NALOXONE HYDROCHLORIDE 0.4 MG/ML
0.4 INJECTION, SOLUTION INTRAMUSCULAR; INTRAVENOUS; SUBCUTANEOUS
Status: DISCONTINUED | OUTPATIENT
Start: 2023-01-31 | End: 2023-02-03 | Stop reason: HOSPADM

## 2023-01-31 RX ORDER — ONDANSETRON 2 MG/ML
4 INJECTION INTRAMUSCULAR; INTRAVENOUS EVERY 30 MIN PRN
Status: DISCONTINUED | OUTPATIENT
Start: 2023-01-31 | End: 2023-01-31 | Stop reason: HOSPADM

## 2023-01-31 RX ORDER — POLYETHYLENE GLYCOL 3350 17 G/17G
17 POWDER, FOR SOLUTION ORAL DAILY
Status: DISCONTINUED | OUTPATIENT
Start: 2023-02-01 | End: 2023-02-03 | Stop reason: HOSPADM

## 2023-01-31 RX ORDER — NALOXONE HYDROCHLORIDE 0.4 MG/ML
0.2 INJECTION, SOLUTION INTRAMUSCULAR; INTRAVENOUS; SUBCUTANEOUS
Status: DISCONTINUED | OUTPATIENT
Start: 2023-01-31 | End: 2023-02-03 | Stop reason: HOSPADM

## 2023-01-31 RX ORDER — BUPIVACAINE HYDROCHLORIDE AND EPINEPHRINE 5; 5 MG/ML; UG/ML
INJECTION, SOLUTION PERINEURAL PRN
Status: DISCONTINUED | OUTPATIENT
Start: 2023-01-31 | End: 2023-01-31 | Stop reason: HOSPADM

## 2023-01-31 RX ORDER — PROCHLORPERAZINE MALEATE 5 MG
5 TABLET ORAL EVERY 6 HOURS PRN
Status: DISCONTINUED | OUTPATIENT
Start: 2023-01-31 | End: 2023-02-03 | Stop reason: HOSPADM

## 2023-01-31 RX ORDER — DEXAMETHASONE SODIUM PHOSPHATE 4 MG/ML
4 INJECTION, SOLUTION INTRA-ARTICULAR; INTRALESIONAL; INTRAMUSCULAR; INTRAVENOUS; SOFT TISSUE
Status: DISCONTINUED | OUTPATIENT
Start: 2023-01-31 | End: 2023-01-31 | Stop reason: HOSPADM

## 2023-01-31 RX ORDER — SODIUM CHLORIDE, SODIUM LACTATE, POTASSIUM CHLORIDE, CALCIUM CHLORIDE 600; 310; 30; 20 MG/100ML; MG/100ML; MG/100ML; MG/100ML
INJECTION, SOLUTION INTRAVENOUS CONTINUOUS
Status: DISCONTINUED | OUTPATIENT
Start: 2023-01-31 | End: 2023-02-01

## 2023-01-31 RX ORDER — PROPOFOL 10 MG/ML
INJECTION, EMULSION INTRAVENOUS PRN
Status: DISCONTINUED | OUTPATIENT
Start: 2023-01-31 | End: 2023-01-31

## 2023-01-31 RX ORDER — NEOSTIGMINE METHYLSULFATE 1 MG/ML
VIAL (ML) INJECTION PRN
Status: DISCONTINUED | OUTPATIENT
Start: 2023-01-31 | End: 2023-01-31

## 2023-01-31 RX ORDER — LIDOCAINE 40 MG/G
CREAM TOPICAL
Status: DISCONTINUED | OUTPATIENT
Start: 2023-01-31 | End: 2023-02-03 | Stop reason: HOSPADM

## 2023-01-31 RX ORDER — HYDROMORPHONE HCL IN WATER/PF 6 MG/30 ML
0.4 PATIENT CONTROLLED ANALGESIA SYRINGE INTRAVENOUS
Status: DISCONTINUED | OUTPATIENT
Start: 2023-01-31 | End: 2023-02-03 | Stop reason: HOSPADM

## 2023-01-31 RX ORDER — CEFAZOLIN SODIUM/WATER 2 G/20 ML
2 SYRINGE (ML) INTRAVENOUS
Status: COMPLETED | OUTPATIENT
Start: 2023-01-31 | End: 2023-01-31

## 2023-01-31 RX ORDER — DEXTROSE MONOHYDRATE 25 G/50ML
25-50 INJECTION, SOLUTION INTRAVENOUS
Status: DISCONTINUED | OUTPATIENT
Start: 2023-01-31 | End: 2023-02-03 | Stop reason: HOSPADM

## 2023-01-31 RX ORDER — LIDOCAINE 40 MG/G
CREAM TOPICAL
Status: DISCONTINUED | OUTPATIENT
Start: 2023-01-31 | End: 2023-01-31 | Stop reason: HOSPADM

## 2023-01-31 RX ORDER — AMOXICILLIN 250 MG
1 CAPSULE ORAL 2 TIMES DAILY
Status: DISCONTINUED | OUTPATIENT
Start: 2023-01-31 | End: 2023-02-03 | Stop reason: HOSPADM

## 2023-01-31 RX ORDER — OXYCODONE HYDROCHLORIDE 5 MG/1
10 TABLET ORAL EVERY 4 HOURS PRN
Status: DISCONTINUED | OUTPATIENT
Start: 2023-01-31 | End: 2023-02-01

## 2023-01-31 RX ORDER — DIAZEPAM 10 MG/2ML
2.5 INJECTION, SOLUTION INTRAMUSCULAR; INTRAVENOUS
Status: DISCONTINUED | OUTPATIENT
Start: 2023-01-31 | End: 2023-01-31 | Stop reason: HOSPADM

## 2023-01-31 RX ORDER — LABETALOL HYDROCHLORIDE 5 MG/ML
10 INJECTION, SOLUTION INTRAVENOUS EVERY 10 MIN PRN
Status: DISCONTINUED | OUTPATIENT
Start: 2023-01-31 | End: 2023-01-31 | Stop reason: HOSPADM

## 2023-01-31 RX ORDER — CEFAZOLIN SODIUM 1 G/3ML
1 INJECTION, POWDER, FOR SOLUTION INTRAMUSCULAR; INTRAVENOUS EVERY 8 HOURS
Status: COMPLETED | OUTPATIENT
Start: 2023-01-31 | End: 2023-01-31

## 2023-01-31 RX ORDER — HYDROMORPHONE HCL IN WATER/PF 6 MG/30 ML
0.2 PATIENT CONTROLLED ANALGESIA SYRINGE INTRAVENOUS
Status: DISCONTINUED | OUTPATIENT
Start: 2023-01-31 | End: 2023-02-03 | Stop reason: HOSPADM

## 2023-01-31 RX ORDER — HYDROMORPHONE HCL IN WATER/PF 6 MG/30 ML
0.4 PATIENT CONTROLLED ANALGESIA SYRINGE INTRAVENOUS EVERY 5 MIN PRN
Status: DISCONTINUED | OUTPATIENT
Start: 2023-01-31 | End: 2023-01-31 | Stop reason: HOSPADM

## 2023-01-31 RX ORDER — CEFAZOLIN SODIUM/WATER 2 G/20 ML
2 SYRINGE (ML) INTRAVENOUS SEE ADMIN INSTRUCTIONS
Status: DISCONTINUED | OUTPATIENT
Start: 2023-01-31 | End: 2023-01-31 | Stop reason: HOSPADM

## 2023-01-31 RX ORDER — SODIUM CHLORIDE, SODIUM LACTATE, POTASSIUM CHLORIDE, CALCIUM CHLORIDE 600; 310; 30; 20 MG/100ML; MG/100ML; MG/100ML; MG/100ML
INJECTION, SOLUTION INTRAVENOUS CONTINUOUS
Status: DISCONTINUED | OUTPATIENT
Start: 2023-01-31 | End: 2023-01-31 | Stop reason: HOSPADM

## 2023-01-31 RX ORDER — MAGNESIUM HYDROXIDE 1200 MG/15ML
LIQUID ORAL PRN
Status: DISCONTINUED | OUTPATIENT
Start: 2023-01-31 | End: 2023-01-31 | Stop reason: HOSPADM

## 2023-01-31 RX ORDER — LIDOCAINE HYDROCHLORIDE 10 MG/ML
INJECTION, SOLUTION INFILTRATION; PERINEURAL PRN
Status: DISCONTINUED | OUTPATIENT
Start: 2023-01-31 | End: 2023-01-31

## 2023-01-31 RX ORDER — TRANEXAMIC ACID 10 MG/ML
1 INJECTION, SOLUTION INTRAVENOUS ONCE
Status: COMPLETED | OUTPATIENT
Start: 2023-01-31 | End: 2023-01-31

## 2023-01-31 RX ORDER — ACETAMINOPHEN 325 MG/1
650 TABLET ORAL EVERY 4 HOURS PRN
Status: DISCONTINUED | OUTPATIENT
Start: 2023-02-03 | End: 2023-02-03 | Stop reason: HOSPADM

## 2023-01-31 RX ORDER — ONDANSETRON 4 MG/1
4 TABLET, ORALLY DISINTEGRATING ORAL EVERY 6 HOURS PRN
Status: DISCONTINUED | OUTPATIENT
Start: 2023-01-31 | End: 2023-02-03 | Stop reason: HOSPADM

## 2023-01-31 RX ORDER — TRANEXAMIC ACID 10 MG/ML
1 INJECTION, SOLUTION INTRAVENOUS ONCE
Status: DISCONTINUED | OUTPATIENT
Start: 2023-01-31 | End: 2023-01-31 | Stop reason: HOSPADM

## 2023-01-31 RX ORDER — BISACODYL 10 MG
10 SUPPOSITORY, RECTAL RECTAL DAILY PRN
Status: DISCONTINUED | OUTPATIENT
Start: 2023-01-31 | End: 2023-02-03 | Stop reason: HOSPADM

## 2023-01-31 RX ORDER — DEXAMETHASONE SODIUM PHOSPHATE 4 MG/ML
INJECTION, SOLUTION INTRA-ARTICULAR; INTRALESIONAL; INTRAMUSCULAR; INTRAVENOUS; SOFT TISSUE PRN
Status: DISCONTINUED | OUTPATIENT
Start: 2023-01-31 | End: 2023-01-31

## 2023-01-31 RX ORDER — NICOTINE POLACRILEX 4 MG
15-30 LOZENGE BUCCAL
Status: DISCONTINUED | OUTPATIENT
Start: 2023-01-31 | End: 2023-02-03 | Stop reason: HOSPADM

## 2023-01-31 RX ORDER — HYDROMORPHONE HCL IN WATER/PF 6 MG/30 ML
0.2 PATIENT CONTROLLED ANALGESIA SYRINGE INTRAVENOUS EVERY 5 MIN PRN
Status: DISCONTINUED | OUTPATIENT
Start: 2023-01-31 | End: 2023-01-31 | Stop reason: HOSPADM

## 2023-01-31 RX ORDER — FENTANYL CITRATE 50 UG/ML
50 INJECTION, SOLUTION INTRAMUSCULAR; INTRAVENOUS EVERY 5 MIN PRN
Status: DISCONTINUED | OUTPATIENT
Start: 2023-01-31 | End: 2023-01-31 | Stop reason: HOSPADM

## 2023-01-31 RX ADMIN — PHENYLEPHRINE HYDROCHLORIDE 150 MCG: 10 INJECTION INTRAVENOUS at 08:08

## 2023-01-31 RX ADMIN — PHENYLEPHRINE HYDROCHLORIDE 100 MCG: 10 INJECTION INTRAVENOUS at 07:41

## 2023-01-31 RX ADMIN — INSULIN ASPART 7 UNITS: 100 INJECTION, SOLUTION INTRAVENOUS; SUBCUTANEOUS at 12:07

## 2023-01-31 RX ADMIN — ROCURONIUM BROMIDE 50 MG: 50 INJECTION, SOLUTION INTRAVENOUS at 07:37

## 2023-01-31 RX ADMIN — FENTANYL CITRATE 50 MCG: 50 INJECTION, SOLUTION INTRAMUSCULAR; INTRAVENOUS at 07:56

## 2023-01-31 RX ADMIN — POTASSIUM CHLORIDE AND SODIUM CHLORIDE: 450; 150 INJECTION, SOLUTION INTRAVENOUS at 00:18

## 2023-01-31 RX ADMIN — GLYCOPYRROLATE 0.4 MG: 0.2 INJECTION, SOLUTION INTRAMUSCULAR; INTRAVENOUS at 08:19

## 2023-01-31 RX ADMIN — Medication: at 03:32

## 2023-01-31 RX ADMIN — INSULIN ASPART 3 UNITS: 100 INJECTION, SOLUTION INTRAVENOUS; SUBCUTANEOUS at 18:55

## 2023-01-31 RX ADMIN — CEFAZOLIN 1 G: 1 INJECTION, POWDER, FOR SOLUTION INTRAMUSCULAR; INTRAVENOUS at 14:39

## 2023-01-31 RX ADMIN — INSULIN HUMAN 7 UNITS: 100 INJECTION, SUSPENSION SUBCUTANEOUS at 12:06

## 2023-01-31 RX ADMIN — PHENYLEPHRINE HYDROCHLORIDE 100 MCG: 10 INJECTION INTRAVENOUS at 07:44

## 2023-01-31 RX ADMIN — PHENYLEPHRINE HYDROCHLORIDE 100 MCG: 10 INJECTION INTRAVENOUS at 08:00

## 2023-01-31 RX ADMIN — ONDANSETRON 4 MG: 2 INJECTION INTRAMUSCULAR; INTRAVENOUS at 08:19

## 2023-01-31 RX ADMIN — FENTANYL CITRATE 50 MCG: 50 INJECTION, SOLUTION INTRAMUSCULAR; INTRAVENOUS at 07:36

## 2023-01-31 RX ADMIN — PHENYLEPHRINE HYDROCHLORIDE 150 MCG: 10 INJECTION INTRAVENOUS at 08:12

## 2023-01-31 RX ADMIN — DEXAMETHASONE SODIUM PHOSPHATE 4 MG: 4 INJECTION, SOLUTION INTRA-ARTICULAR; INTRALESIONAL; INTRAMUSCULAR; INTRAVENOUS; SOFT TISSUE at 07:37

## 2023-01-31 RX ADMIN — PROPOFOL 140 MG: 10 INJECTION, EMULSION INTRAVENOUS at 07:36

## 2023-01-31 RX ADMIN — Medication 2 G: at 07:31

## 2023-01-31 RX ADMIN — ACETAMINOPHEN 650 MG: 325 TABLET, FILM COATED ORAL at 00:26

## 2023-01-31 RX ADMIN — CEFAZOLIN 1 G: 1 INJECTION, POWDER, FOR SOLUTION INTRAMUSCULAR; INTRAVENOUS at 22:50

## 2023-01-31 RX ADMIN — TRANEXAMIC ACID 1 G: 10 INJECTION, SOLUTION INTRAVENOUS at 07:41

## 2023-01-31 RX ADMIN — NEOSTIGMINE METHYLSULFATE 3 MG: 1 INJECTION, SOLUTION INTRAVENOUS at 08:19

## 2023-01-31 RX ADMIN — SODIUM CHLORIDE, POTASSIUM CHLORIDE, SODIUM LACTATE AND CALCIUM CHLORIDE: 600; 310; 30; 20 INJECTION, SOLUTION INTRAVENOUS at 23:55

## 2023-01-31 RX ADMIN — INSULIN GLARGINE 12 UNITS: 100 INJECTION, SOLUTION SUBCUTANEOUS at 22:47

## 2023-01-31 RX ADMIN — POTASSIUM CHLORIDE, DEXTROSE MONOHYDRATE AND SODIUM CHLORIDE: 150; 5; 450 INJECTION, SOLUTION INTRAVENOUS at 02:04

## 2023-01-31 RX ADMIN — PHENYLEPHRINE HYDROCHLORIDE 100 MCG: 10 INJECTION INTRAVENOUS at 08:04

## 2023-01-31 RX ADMIN — SODIUM CHLORIDE, POTASSIUM CHLORIDE, SODIUM LACTATE AND CALCIUM CHLORIDE: 600; 310; 30; 20 INJECTION, SOLUTION INTRAVENOUS at 06:38

## 2023-01-31 RX ADMIN — LIDOCAINE HYDROCHLORIDE 50 MG: 10 INJECTION, SOLUTION INFILTRATION; PERINEURAL at 07:36

## 2023-01-31 RX ADMIN — PHENYLEPHRINE HYDROCHLORIDE 0.5 MCG/KG/MIN: 10 INJECTION INTRAVENOUS at 08:12

## 2023-01-31 RX ADMIN — SODIUM CHLORIDE, POTASSIUM CHLORIDE, SODIUM LACTATE AND CALCIUM CHLORIDE: 600; 310; 30; 20 INJECTION, SOLUTION INTRAVENOUS at 12:02

## 2023-01-31 RX ADMIN — ACETAMINOPHEN 650 MG: 325 TABLET, FILM COATED ORAL at 14:38

## 2023-01-31 RX ADMIN — ACETAMINOPHEN 650 MG: 325 TABLET, FILM COATED ORAL at 22:38

## 2023-01-31 ASSESSMENT — ACTIVITIES OF DAILY LIVING (ADL)
DIFFICULTY_EATING/SWALLOWING: NO
ADLS_ACUITY_SCORE: 35
ADLS_ACUITY_SCORE: 35
ADLS_ACUITY_SCORE: 18
DRESSING/BATHING_DIFFICULTY: NO
NUMBER_OF_TIMES_PATIENT_HAS_FALLEN_WITHIN_LAST_SIX_MONTHS: 1
ADLS_ACUITY_SCORE: 18
WALKING_OR_CLIMBING_STAIRS_DIFFICULTY: NO
ADLS_ACUITY_SCORE: 18
ADLS_ACUITY_SCORE: 35
EQUIPMENT_CURRENTLY_USED_AT_HOME: CANE, STRAIGHT;GRAB BAR, TUB/SHOWER
CHANGE_IN_FUNCTIONAL_STATUS_SINCE_ONSET_OF_CURRENT_ILLNESS/INJURY: YES
ADLS_ACUITY_SCORE: 18
ADLS_ACUITY_SCORE: 18
WEAR_GLASSES_OR_BLIND: NO
ADLS_ACUITY_SCORE: 35
CONCENTRATING,_REMEMBERING_OR_MAKING_DECISIONS_DIFFICULTY: NO
TOILETING_ISSUES: NO
FALL_HISTORY_WITHIN_LAST_SIX_MONTHS: YES
ADLS_ACUITY_SCORE: 35
DOING_ERRANDS_INDEPENDENTLY_DIFFICULTY: NO
ADLS_ACUITY_SCORE: 18
ADLS_ACUITY_SCORE: 35

## 2023-01-31 NOTE — ANESTHESIA PROCEDURE NOTES
Airway       Patient location during procedure: OR       Procedure Start/Stop Times: 1/31/2023 7:39 AM  Staff -        Anesthesiologist:  Yoandy Schafer MD       CRNA: Severson, Dean Dennis, APRN CRNA       Performed By: CRNA  Consent for Airway        Urgency: elective  Indications and Patient Condition       Indications for airway management: marianne-procedural and airway protection       Induction type:intravenous       Mask difficulty assessment: 1 - vent by mask    Final Airway Details       Final airway type: endotracheal airway       Successful airway: ETT - single  Endotracheal Airway Details        ETT size (mm): 7.0       Cuffed: yes       Successful intubation technique: video laryngoscopy       VL Blade Size: Glidescope 3       Grade View of Cords: 1       Adjucts: stylet       Position: Center       Measured from: lips       Secured at (cm): 22       Bite block used: Soft    Post intubation assessment        Placement verified by: capnometry, equal breath sounds and chest rise        Number of attempts at approach: 1       Number of other approaches attempted: 0       Secured with: plastic tape       Ease of procedure: easy       Dentition: Intact and Unchanged    Medication(s) Administered   Medication Administration Time: 1/31/2023 7:39 AM

## 2023-01-31 NOTE — CONSULTS
Patient has Medicare Advantage through [facility].    As of 1/1/23, formulary insulins are covered under all Medicare drug plans for $35 per 30 day supply year-round.      The formulary insulins under this plan are:    Rapid-acting   Humalog Kwikpens      Intermediate   Humulin N Kwikpens      Basal   Lantus Solostar   Levemir pens     Covered glucometers (per 100 strips)   Accu-Chek Lydia $31   Accu-Chek Guide $8   Contour Next $0   Freestyle Lite $28   One Touch Ultra $26   One Touch Verio $14     Chayito Gonzalez  Pharmacy Technician/Liaison, Discharge Pharmacy   [phone] (voice or text)  [email or web address]

## 2023-01-31 NOTE — ED NOTES
"Patient declining cardiac monitoring.    \"I don't need that\"    Agreed to met patient residential. On blood pressure cuff and pulse ox  "

## 2023-01-31 NOTE — OP NOTE
[facility]  Orthopedic Operative Note    Percutaneous Screw Fixation Femoral Neck Fracture    Jennifer Presley MRN# [id number]   YOB: 1950  Procedure Date:  1/31/2023  Age: 72 year old     PREOPERATIVE DIAGNOSIS:  right valgus impacted femoral neck fracture.    POSTOPERATIVE DIAGNOSIS:  right valgus impacted femoral neck fracture.    PROCEDURE PERFORMED:  In-situ percutaneous screw fixation, right valgus impacted femoral neck fracture.    SURGEON:  SHANA MARTINEZ MD    FIRST ASSISTANT:  Torey King OTC; A skilled first assistant was necessary for this procedure for assistance with patient positioning, prepping, draping, surgical visualization, wound closure, and application of the dressing.     ANESTHESIA:  General + Local    EBL: 50cc    COMPLICATIONS: None    DISPOSITION: Post Anesthesia Care Unit     CONDITION: Stable     INDICATIONS:  Jennifer Presley  is a72 year old-year-old female with a minimally displaced right femoral neck fracture after a fall.  We discussed risks and benefits of operative versus nonoperative intervention and expected recovery.  After thorough discussion, I recommended a right hip pinning to stabilize her fracture and allow for early function and weightbearing.  She was in agreement and elected to proceed.    Risks of surgery discussed included but not limited to bleeding, infection, damage to surrounding neurovascular structures, nonunion, malunion, avascular necrosis, leg length inequality, need for revision surgery including partial versus total hip arthroplasty, blood clots, pulmonary embolus, and the medical risks of anesthesia including MI, stroke, death.  We discussed the benefits of surgery including improved chance of fracture union and improve function as well as reduction in the medical risks of hip fracture.  We discussed the alternatives including nonoperative management, which I did not recommend.  The informed  consent was signed and documented.  I met with the patient preoperatively to eugenio the operative extremity.     IMPLANTS:  Implant Name Type Inv. Item Serial No.  Lot No. LRB No. Used Action   IMP SCR CAN 6.5X85MM FT .473 - [id number] Metallic Hardware/Leoti IMP SCR CAN 6.5X85MM FT .473  [employer] 8005, 04 JAN 2023 Right 1 Implanted   IMP SCR CAN 6.5X75MM FT .471 - [id number] Metallic Hardware/Leoti IMP SCR CAN 6.5X75MM FT .471  [employer] LOAD 8005, 04 JAN 2023 Right 2 Implanted   IMP WASHER SYN 13.0MM .99 - [id number] Metallic Hardware/Leoti IMP WASHER SYN 13.0MM .99  [employer] LOAD 8005, 04 JAN 2023 Right 3 Implanted           PROCEDURE: The patient was brought in the operating room and placed supine on the fracture table.  After induction of general anesthesia their feet were placed in boots and the perineal post was placed.  All bony prominences were well-padded.  Preoperative fluoroscopic imaging demonstrated maintained alignment of the fracture.  The hip was prepped and draped in normal sterile fashion.  A procedural pause was conducted to verify correct patient, correct extremity, presence of the surgeon's initials on the operative extremity, and administration of antibiotics, in this case Ancef.       Following generalized agreement we used fluoroscopic guidance to localize a small skin incision on the lateral aspect of the femur at the hip.  We incised the fascia in line with the correct trajectory for guidewire placement.  Inverted triangle guidewire configuration was placed by first inserting the inferior calcar wire followed by the superior anterior wire followed by the superior posterior wire.  The wires were measured and appropriate length fully threaded 6.5 mm cannulated screws were placed beginning with the the superior anterior screw and superior posterior screw and then the inferior screw.     Final fluoroscopic views in AP,  lateral and multiple obliques were performed to confirm appropriate screw placement and extra-articular position of all screws.  Guidewires were removed and wound was copiously irrigated with sterile saline and closed in layers with 0 Vicryl along the IT band, 2-0 Vicryl in the subcutaneous tissues and 3-0 monocryl with glue for the skin.       A xeroform, gauze, and Tegaderm dressing was applied patient awakened from general anesthesia and taken to recovery room in stable condition.  There were no complications and the patient tolerated well.     PLAN:  Activity:  Weight-bear as tolerated, range of motion as tolerated, PT/OT consults  24 hours IV antibiotics per standard postop protocol  DVT prophylaxis -  SCDs and ASA 325mg POD1  Case management and social work for discharge planning  Hospitalist comanagement  Change dressing if saturated     FOLLOWUP:     1.  Plan 2-week wound check with x-rays (AP and Lateral Xrays).  This could also be done at patients rehab facility with x-rays sent to me at [facility].   2.  Eight-week followup with X-rays.     Dr. Stephenson's care coordinator is Kerry Brown. Please contact her at [phone] to schedule an appointment.       Dr. Stephenson sees patient's at 2 clinic locations:  1. [facility]  ? [address] [place] [address]  2. [facility]   ? [address] , [address], [place] [address]       SHANA STEPHENSON MD   [facility]

## 2023-01-31 NOTE — ANESTHESIA POSTPROCEDURE EVALUATION
Patient: Jennifer Presley    Procedure: Procedure(s):  CLOSED REDUCTION, HIP, WITH PERCUTANEOUS PINNING       Anesthesia Type:  General    Note:     Postop Pain Control: Uneventful            Sign Out: Well controlled pain   PONV: No   Neuro/Psych: Uneventful            Sign Out: Acceptable/Baseline neuro status   Airway/Respiratory: Uneventful            Sign Out: Acceptable/Baseline resp. status   CV/Hemodynamics: Uneventful            Sign Out: Acceptable CV status   Other NRE: NONE   DID A NON-ROUTINE EVENT OCCUR? No           Last vitals:  Vitals Value Taken Time   /70 01/31/23 0905   Temp 97.5  F (36.4  C) 01/31/23 0840   Pulse 86 01/31/23 0908   Resp 8 01/31/23 0908   SpO2 100 % 01/31/23 0908   Vitals shown include unvalidated device data.    Electronically Signed By: Yoandy Schafer MD  January 31, 2023  9:09 AM

## 2023-01-31 NOTE — ED NOTES
[facility]  ED Nurse Handoff Report    Jennifer Presley is a 72 year old female   ED Chief complaint: Hip Pain  . ED Diagnosis:   Final diagnoses:   Closed fracture of neck of right femur, initial encounter (H)   Diabetic ketoacidosis without coma associated with type 2 diabetes mellitus (H)     Allergies: No Known Allergies    Code Status: unsure on orders, patient in conversation appears ready to go    Activity level - Baseline/Home:  Independent. Activity Level - Current:   BED REST per SONALI KENNEDY. patient up with SBA to bedside commode. Lift room needed: No. Bariatric: No   Needed: No   Isolation: No. Infection: Not Applicable.     Vital Signs:   Vitals:    01/31/23 0415 01/31/23 0430 01/31/23 0445 01/31/23 0500   BP:  101/61  99/60   Pulse:  94  93   Resp:       Temp:       TempSrc:       SpO2: 99% 99% 98% 100%       Cardiac Rhythm:  ,      Pain level:    Patient confused: No. Patient Falls Risk: Yes.   Elimination Status: Has voided   Patient Report - Initial Complaint: Referred here for xray results stating: Minimally displaced subcapital right femoral neck fracture suspected.     Patient states she was sent here for CT.      Mechanical ground level fall yesterday. Unable to walk on it today.  Focused Assessment: patient is alert and orient.  Right hip pain, able to move self in bed  Left PIV  Right PIV  Insulin infusion   Tests Performed:   CT Hip Right w/o Contrast   Final Result   IMPRESSION:   1.  Acute impacted fracture of the right femoral neck with mild valgus deformity.   2.  Osteoarthritis in both hip and SI joints.           . Abnormal Results:   CT Hip Right w/o Contrast   Final Result   IMPRESSION:   1.  Acute impacted fracture of the right femoral neck with mild valgus deformity.   2.  Osteoarthritis in both hip and SI joints.              Treatments provided: see MAR  Family Comments: per patient and has cell phone  OBS brochure/video discussed/provided to patient:   N/A  ED Medications:   Medications   melatonin tablet 5 mg (has no administration in time range)   dextrose 50 % injection 25-50 mL (has no administration in time range)   insulin 1 unit/1 mL in NS (NovoLIN, HumuLIN Regular) drip -ED DKA algorithm (0 Units/hr Intravenous ED Infusing on Admission/transfer 1/31/23 0541)   insulin 1 unit/1mL in saline (NovoLIN, HumuLIN Regular) drip - DKA algorithm (0 Units/hr Intravenous Stopped 1/31/23 0055)   glucose gel 15-30 g (has no administration in time range)     Or   dextrose 50 % injection 25-50 mL (has no administration in time range)     Or   glucagon injection 1 mg (has no administration in time range)   0.45% sodium chloride + KCl 20 mEq/L infusion (0 mL/hr Intravenous Stopped 1/31/23 0204)   dextrose 5% and 0.45% NaCl + KCl 20 mEq/L infusion (0 mLs Intravenous ED Infusing on Admission/transfer 1/31/23 0541)   lidocaine 1 % 0.1-1 mL (has no administration in time range)   lidocaine (LMX4) cream (has no administration in time range)   sodium chloride (PF) 0.9% PF flush 3 mL (3 mLs Intracatheter Not Given 1/31/23 0021)   sodium chloride (PF) 0.9% PF flush 3 mL (has no administration in time range)   melatonin tablet 1 mg (has no administration in time range)   acetaminophen (TYLENOL) tablet 975 mg (650 mg Oral Given 1/31/23 0026)   oxyCODONE IR (ROXICODONE) half-tab 2.5 mg (has no administration in time range)   HYDROmorphone (DILAUDID) injection 0.2 mg (has no administration in time range)   melatonin tablet 3 mg (has no administration in time range)   senna-docusate (SENOKOT-S/PERICOLACE) 8.6-50 MG per tablet 1 tablet (has no administration in time range)     Or   senna-docusate (SENOKOT-S/PERICOLACE) 8.6-50 MG per tablet 2 tablet (has no administration in time range)   ondansetron (ZOFRAN ODT) ODT tab 4 mg (has no administration in time range)     Or   ondansetron (ZOFRAN) injection 4 mg (has no administration in time range)   nitroGLYcerin (NITROSTAT) sublingual  tablet 0.4 mg (has no administration in time range)   0.9% sodium chloride BOLUS (0 mLs Intravenous Stopped 1/30/23 9365)     Drips infusing:  Yes  For the majority of the shift, the patient's behavior Green. Interventions performed were support, reassurance. Encouragement to have treatments    Sepsis treatment initiated: No     Patient tested for COVID 19 prior to admission: NO    ED Nurse Name/Phone Number: Paris Donaldson RN,   10:07 PM

## 2023-01-31 NOTE — H&P
[facility]    Hospitalist History and Physical    Name: Jennifer Presley    MRN: [id number]  YOB: 1950    Age: 72 year old  Date of Admission:  1/30/2023  Date of Service (when I saw the patient): 01/30/23    Assessment & Plan   Jennifer Presley is a 72 year old female with PMH significant for uncontrolled DM2 who presents to the ED for evaluation after a fall on 1/29 with concern for ongoing right hip and groin pain.     ED workup reveals: VSS, sodium 132, potassium of 4.5, chloride of 91, CO2 of 10, anion gap of 31, BUN of 24.4, glucose of 457, CBC unremarkable, VBG shows pH of 7.21, PCO2 of 36, PO2 of 18, bicarbonate of 14,, magnesium WNL, phosphorus of 6.1, qualitative ketones of 5.7, EKG shows rate of 97 bpm and sinus rhythm, possible anterior infarct, age undetermined, x-ray of right hip at urgent care shows minimally displaced subcapital right femoral neck fracture suspected, and CT of right hip in ED shows acute impacted fracture of the right femoral neck with mild valgus deformity.     ED provider contacted orthopedic surgery with plans for possible pinning in AM.    #DKA  #Uncontrolled DM2 with peripheral neuropathy  #Pseudohyponatremia  #Hyperphosphatemia: previously on metformin but did not like how this made her feel due to diarrhea.  Last saw PCP in 2019 when HgbA1c was 14.5 and did not like the discussion of trying an alternative diabetic agent and other treat her neuropathy at that time. She did loose 60 pounds since then. She has not had any follow-up since though and does not check her blood sugar regularly.  BMP shows CO2 of 10 and sodium of 132 with glucose of 457.  Phosphorus is elevated at 6.1.  VBG consistent with metabolic acidosis from DKA.  Qualitative ketones elevated at 5.7.  - HgbA1c of 13.2  - BMP every 4 hours, will not follow serial ketones if gap closing on serial BMPs  - recheck VBG in AM  - continue insulin gtt initiated in the ED  -  blood glucose checks   - received 1 L NS in ED, continue IVF hydration NS + KCl at 100 ml/hour     #Right femoral neck fracture  #Sp mechanical fall: fell on 1/29 and attempting to bring in an 18 pound package at her doorstep where she lost her balance resulting in her falling backwards into her screen door.  No loss of consciousness and did not hit her head.  Acute onset of right hip and groin pain since with difficulty ambulating.  Seen at urgent care on 1/30 with x-ray concerning for femoral neck fracture.  CT in ED confirms acute impacted fracture of right femoral neck with mild valgus deformity.  - bedrest  - CMS checks  - pain control, currently patient declining interventions in the ED  - orthopedic surgery consult, initial plan for pinning in AM on 1/31 but this may need to be delayed due to above, will need to reassess in AM  - NPO at midnight     #HLD: most recent lipid panel in 2019 showed relative 56, triglycerides of 206, HDL 44, and LDL of 171.  - recheck lipid panel in AM  - recommend outpatient f/u to discuss management pending labs    Clinically Significant Risk Factors Present on Admission             # Anion Gap Metabolic Acidosis: Highest Anion Gap = 31 mmol/L in last 2 days, will monitor and treat as appropriate      # Hypertension: home medication list includes antihypertensive(s)            DVT Prophylaxis: Pneumatic Compression Devices  Code Status: Full Code, discussed with patient  Disposition: Expected stay >2 midnights, will admit to [facility] due to requiring insulin gtt     Primary Care Physician   Andrez Soares    Chief Complaint   S/p fall and right hip/groin pain     History obtained from discussion with ED provider, Dr. Hodge, chart review, and interview with patient.     History of Present Illness   Jennifer Presley is a 72 year old female who presents after a fall on 1/29 when the patient was attempting to  and hold an 18 pound package while holding open her screen door.   "Due to her peripheral neuropathy she lost her balance and fell backwards into the screen door.  She reports feeling a pop in her right hip but was able to crawl into her house and pull her self up onto a sofa.  She contacted one of her neighbors to help get her stepstool that she used to walk around her house.  She states it takes her \"5 minutes to get to the bathroom\" due to her pain and inability to put weight on her right leg.  Due to ongoing pain today in her right hip she was seen at urgent care and had x-rays performed which showed concern for right femoral neck fracture.  Denies hitting her head or loss of consciousness with her fall.  She has taken some ibuprofen for pain at home.  She reports no family in the area and that her son lives in [place].  Denies any recent nausea, vomiting, diarrhea, chest pain, shortness of breath, or abdominal pain.  She states she is very sedentary at baseline.  She has a known history of diabetes that she does not manage.  She used to be on metformin but did not like it due to the side effect of diarrhea.  Her primary care provider in 2019 tried to have her switch to Jardiance but she was reluctant.  Since then she has not really followed up with her PCP, took her self off of metformin, and lost 60 pounds.  She has not checked her blood sugar for at least 5 years.  She notes that she has a tendency to like sweets and carbs which has been problematic for her diabetes.  Denies any prior history of surgery.  She is not currently on a blood thinner.    Past Medical History    Past Medical History:   Diagnosis Date     B-COMPLEX DEFIC NEC 2/28/2008     DIABETES MELLITUS TYPE II-UNCOMPL 3/5/2007    Diagnosed in 1999     Past Surgical History   Past Surgical History:   Procedure Laterality Date     HC REMOVE TONSILS/ADENOIDS,<14 Y/O      T & A <13y.o.     ZZC NONSPECIFIC PROCEDURE  1977    fourth degree laceration, postpartum     Prior to Admission Medications   Prior to Admission " Medications   Prescriptions Last Dose Informant Patient Reported? Taking?   ACE NOT PRESCRIBED, INTENTIONAL,   No No   Si each continuous prn ACE Inhibitor not prescribed due to Refusal by patient   ibuprofen (ADVIL/MOTRIN) 600 MG tablet   No No   Sig: Take 1 tablet (600 mg) by mouth every 6 hours as needed for moderate pain   tiZANidine (ZANAFLEX) 2 MG tablet   No No   Sig: Take 1 tablet (2 mg) by mouth 3 times daily as needed for muscle spasms      Facility-Administered Medications: None     Allergies   No Known Allergies    Social History   Social History     Tobacco Use     Smoking status: Never     Smokeless tobacco: Never   Substance Use Topics     Alcohol use: Yes     Alcohol/week: 1.7 standard drinks     Social History     Social History Narrative     Not on file     Family History   Unique history reviewed with patient and significant for maternal grandmother with type 1 diabetes and multiple family members with cancer on mother's side.     Review of Systems   A Comprehensive greater than 10 system review of systems was carried out.  Pertinent positives and negatives are noted above.  Otherwise negative for contributory information.    Physical Exam   Temp: 98.6  F (37  C) Temp src: Oral BP: 127/75 Pulse: 95   Resp: 20 SpO2: 95 % O2 Device: None (Room air)    Vital Signs with Ranges  Temp:  [97.8  F (36.6  C)-98.6  F (37  C)] 98.6  F (37  C)  Pulse:  [] 95  Resp:  [20] 20  BP: (103-127)/(75-79) 127/75  SpO2:  [95 %-98 %] 95 %  0 lbs 0 oz    GEN:  Alert, oriented x 3, appears comfortable laying on gurney, no overt distress  HEENT:  Normocephalic/atraumatic, no scleral icterus, no nasal discharge, mouth moist.  CV:  Regular rate and rhythm, no murmur or JVD.  S1 + S2 noted, no S3 or S4.  LUNGS:  Clear to auscultation bilaterally without rales/rhonchi/wheezing/retractions.  Symmetric chest rise on inhalation noted.  ABD:  Active bowel sounds, soft, non-tender/non-distended.  No  rebound/guarding/rigidity.  EXT:  No edema. Tenderness over right hip and groin with palpation. No cyanosis. No acute joint synovitis noted.  SKIN:  Dry to touch, no exanthems noted in the visualized areas.  NEURO:  Symmetric muscle strength, sensation to touch grossly intact.  Coordination symmetric on general exam.  No new focal deficits appreciated.    Data   Data reviewed today:  I personally reviewed the EKG tracing showing rate of 97 bpm and sinus rhythm, possible anterior infarct, age undetermined.    Results for orders placed or performed during the hospital encounter of 01/30/23   CT Hip Right w/o Contrast     Status: None    Narrative    EXAM: CT HIP RIGHT W/O CONTRAST  LOCATION: [facility]  DATE/TIME: 1/30/2023 5:06 PM    INDICATION: right hip pain, XR fracture suspected  COMPARISON: 01/30/2023 radiographs.  TECHNIQUE: Noncontrast. Axial, sagittal and coronal thin-section reconstruction. Dose reduction techniques were used.     FINDINGS:     BONES/JOINTS:  -Acute impacted fracture of the right femoral neck with mild valgus deformity. Mild fragmentation at the cortical margin of the fracture in the femoral neck.  -No additional fractures are identified.  -Mild narrowing and hypertrophic change in the right hip joint.  -Mild narrowing and hypertrophic change in the left hip joint. Mild hypertrophic changes in the SI joints.    SOFT TISSUES:  -No subcutaneous hematoma or fluid collection. No intramuscular hematoma. No retracted tendon tear. No free fluid in the pelvis.      Impression    IMPRESSION:  1.  Acute impacted fracture of the right femoral neck with mild valgus deformity.  2.  Osteoarthritis in both hip and SI joints.     Basic metabolic panel     Status: Abnormal   Result Value Ref Range    Sodium 132 (L) 136 - 145 mmol/L    Potassium 4.5 3.4 - 5.3 mmol/L    Chloride 91 (L) 98 - 107 mmol/L    Carbon Dioxide (CO2) 10 (LL) 22 - 29 mmol/L    Anion Gap 31 (H) 7 - 15 mmol/L     Urea Nitrogen 24.4 (H) 8.0 - 23.0 mg/dL    Creatinine 0.62 0.51 - 0.95 mg/dL    Calcium 9.7 8.8 - 10.2 mg/dL    Glucose 457 (H) 70 - 99 mg/dL    GFR Estimate >90 >60 mL/min/1.73m2   CBC with platelets and differential     Status: Abnormal   Result Value Ref Range    WBC Count 10.2 4.0 - 11.0 10e3/uL    RBC Count 4.18 3.80 - 5.20 10e6/uL    Hemoglobin 13.4 11.7 - 15.7 g/dL    Hematocrit 42.3 35.0 - 47.0 %     (H) 78 - 100 fL    MCH 32.1 26.5 - 33.0 pg    MCHC 31.7 31.5 - 36.5 g/dL    RDW 12.7 10.0 - 15.0 %    Platelet Count 207 150 - 450 10e3/uL    % Neutrophils 81 %    % Lymphocytes 13 %    % Monocytes 6 %    % Eosinophils 0 %    % Basophils 0 %    % Immature Granulocytes 0 %    NRBCs per 100 WBC 0 <1 /100    Absolute Neutrophils 8.1 1.6 - 8.3 10e3/uL    Absolute Lymphocytes 1.3 0.8 - 5.3 10e3/uL    Absolute Monocytes 0.6 0.0 - 1.3 10e3/uL    Absolute Eosinophils 0.0 0.0 - 0.7 10e3/uL    Absolute Basophils 0.0 0.0 - 0.2 10e3/uL    Absolute Immature Granulocytes 0.0 <=0.4 10e3/uL    Absolute NRBCs 0.0 10e3/uL   EKG 12-lead, tracing only     Status: None (Preliminary result)   Result Value Ref Range    Systolic Blood Pressure  mmHg    Diastolic Blood Pressure  mmHg    Ventricular Rate 97 BPM    Atrial Rate 97 BPM    NE Interval 138 ms    QRS Duration 86 ms     ms    QTc 469 ms    P Axis 76 degrees    R AXIS 57 degrees    T Axis 91 degrees    Interpretation ECG       Sinus rhythm  Possible Anterior infarct , age undetermined  Abnormal ECG  No previous ECGs available     CBC with platelets differential     Status: Abnormal    Narrative    The following orders were created for panel order CBC with platelets differential.  Procedure                               Abnormality         Status                     ---------                               -----------         ------                     CBC with platelets and d...[[id number]]  Abnormal            Final result                 Please view results for  these tests on the individual orders.   Results for orders placed or performed in visit on 01/30/23   XR Hip Right 2-3 Views     Status: None    Narrative    EXAM: HIP RIGHT TWO TO THREE VIEWS  DATE/TIME: 1/30/2023 1:06 PM    INDICATION: Fall right side, yesterday. No prior fracture. Hip injury,  right, initial encounter.    COMPARISON: None available.       Impression    IMPRESSION: Minimally displaced subcapital right femoral neck fracture  suspected. Right hip MRI could confirm. Mild right hip osteoarthritis.  No displaced right pelvic fracture is seen. Mild arthritis at the  symphysis pubis.    POLLY CAMPOVERDE MD         SYSTEM ID:  [id number]     Kasia Romano PA-C  [facility]  Securely message with the [employer] Web Console (learn more here)  Text page via [employer] Paging/Directory  I discussed the patient with Dr. Mirza and he agrees with the above plan.

## 2023-01-31 NOTE — ANESTHESIA CARE TRANSFER NOTE
Patient: Jennifer Presley    Procedure: Procedure(s):  CLOSED REDUCTION, HIP, WITH PERCUTANEOUS PINNING       Diagnosis: Closed fracture of neck of right femur, initial encounter (H) [S72.001A]  Diagnosis Additional Information: No value filed.    Anesthesia Type:   General     Note:    Oropharynx: oropharynx clear of all foreign objects  Level of Consciousness: drowsy  Oxygen Supplementation: face mask  Level of Supplemental Oxygen (L/min / FiO2): 5  Independent Airway: airway patency satisfactory and stable  Dentition: dentition unchanged  Vital Signs Stable: post-procedure vital signs reviewed and stable  Report to RN Given: handoff report given  Patient transferred to: PACU    Handoff Report: Identifed the Patient, Identified the Reponsible Provider, Reviewed the pertinent medical history, Discussed the surgical course, Reviewed Intra-OP anesthesia mangement and issues during anesthesia, Set expectations for post-procedure period and Allowed opportunity for questions and acknowledgement of understanding      Vitals:  Vitals Value Taken Time   BP     Temp     Pulse 86 01/31/23 0838   Resp 7 01/31/23 0838   SpO2 100 % 01/31/23 0838   Vitals shown include unvalidated device data.    Electronically Signed By: Dean Dennis Severson, APRN CRNA  January 31, 2023  8:40 AM   No - the patient is unable to be screened due to medical condition

## 2023-01-31 NOTE — ED PROVIDER NOTES
"  History     Chief Complaint:  Hip Pain       HPI   Jennifer Presley is a 72 year old female with a history of diabetes mellitus who presents after a fall yesterday with right hip and groin pain. She was at  today, where X-ray showed right femoral neck fracture. She reports she was holding an 18 lb package yesterday and tried to open her screen door. The package slipped, causing her to fall. Denies hitting her head or losing consciousness. She has been able to ambulate with support, but cannot place weight on her right leg. States, \"it takes 5 minutes to get from the sofa to the bathroom.\" Reports some neck pain upon waking this morning after sleeping on the sofa. Notes some reflux following eating a taco in the last hour or so. Notes chronic neuropathy, denies new numbness or weakness. Reports chronic cough and rhinorrhea. Denies chest pain and urinary problems. She took ibuprofen and acetaminophen yesterday to treat pain. She is prescribed metformin, which she has not been taking, reports her last A1C was at 13 at last check. Denies hx of surgery.     Independent Historian: Yes     Review of External Notes: I reviewed X-ray performed at urgent care today, impressions below.    IMPRESSION: Minimally displaced subcapital right femoral neck fracture  suspected. Right hip MRI could confirm. Mild right hip osteoarthritis.  No displaced right pelvic fracture is seen. Mild arthritis at the  symphysis pubis.    ROS:  Review of Systems   HENT: Positive for rhinorrhea.    Respiratory: Positive for cough.    Cardiovascular: Negative for chest pain.   Musculoskeletal: Positive for arthralgias (right hip) and neck pain.   Neurological: Negative for weakness and numbness.   All other systems reviewed and are negative.    Allergies:  No Known Allergies     Medications:    The patient is currently on no regular medications.     Past Medical History:    B-complex deficiency  Diabetes mellitus, type 2    Past Surgical " History:    Tonsil and adenoidectomy   Fourth degree laceration, postpartum     Family History:    Mother - breast cancer, coronary artery disease, cancer   Brother- Coronary artery disease  Father- cancer     Social History:  The patient presents to the ED with her friend, Emiliana.  PCP: Andrez Soares     Physical Exam     Patient Vitals for the past 24 hrs:   BP Temp Temp src Pulse Resp SpO2   01/30/23 2200 (!) 124/108 -- -- 104 -- 100 %   01/30/23 2145 -- -- -- -- -- 100 %   01/30/23 2131 -- -- -- -- -- 100 %   01/30/23 2130 110/67 -- -- 107 -- --   01/30/23 1938 -- 98.6  F (37  C) Oral 95 20 95 %   01/30/23 1937 127/75 -- -- 103 -- --        Physical Exam  Constitutional: Vital signs reviewed as above  General: Alert, pleasant  HEENT: Dry mucous membranes  Eyes: Pupils are equal, round, and reactive to light.   Neck: Normal range of motion  Cardiovascular: normal rate, Regular rhythm and normal heart sounds.  No MRG  Pulmonary/Chest: Effort normal and breath sounds normal. No respiratory distress. Patient has no wheezes. Patient has no rales.   Gastrointestinal: Soft. Positive bowel sounds. No MRG.  Musculoskeletal/Extremities: Tender to palpation over the right medial hip and groin.  Normal distal sensation and pulses.  No midline neck or back tenderness.  Endo: No pitting edema  Neurological: Alert, no focal deficits.  GCS 15.  Skin: Skin is warm and dry.   Psychiatric: Pleasant      Emergency Department Course   ECG:  ECG results from 01/30/23   EKG 12-lead, tracing only     Value    Systolic Blood Pressure     Diastolic Blood Pressure     Ventricular Rate 97    Atrial Rate 97    ID Interval 138    QRS Duration 86        QTc 469    P Axis 76    R AXIS 57    T Axis 91    Interpretation ECG      Sinus rhythm  Possible Anterior infarct , age undetermined  Abnormal ECG  No previous ECGs available         Imaging:  CT Hip Right w/o Contrast   Final Result   IMPRESSION:   1.  Acute impacted fracture of the  right femoral neck with mild valgus deformity.   2.  Osteoarthritis in both hip and SI joints.            Report per radiology    Laboratory:  Labs Ordered and Resulted from Time of ED Arrival to Time of ED Departure   BASIC METABOLIC PANEL - Abnormal       Result Value    Sodium 132 (*)     Potassium 4.5      Chloride 91 (*)     Carbon Dioxide (CO2) 10 (*)     Anion Gap 31 (*)     Urea Nitrogen 24.4 (*)     Creatinine 0.62      Calcium 9.7      Glucose 457 (*)     GFR Estimate >90     CBC WITH PLATELETS AND DIFFERENTIAL - Abnormal    WBC Count 10.2      RBC Count 4.18      Hemoglobin 13.4      Hematocrit 42.3       (*)     MCH 32.1      MCHC 31.7      RDW 12.7      Platelet Count 207      % Neutrophils 81      % Lymphocytes 13      % Monocytes 6      % Eosinophils 0      % Basophils 0      % Immature Granulocytes 0      NRBCs per 100 WBC 0      Absolute Neutrophils 8.1      Absolute Lymphocytes 1.3      Absolute Monocytes 0.6      Absolute Eosinophils 0.0      Absolute Basophils 0.0      Absolute Immature Granulocytes 0.0      Absolute NRBCs 0.0     BLOOD GAS VENOUS WITH OXYHEMOGLOBIN - Abnormal    pH Venous 7.21 (*)     pCO2 Venous 36 (*)     pO2 Venous 18 (*)     Bicarbonate Venous 14 (*)     FIO2 0      Oxyhemoglobin Venous 22 (*)     Base Excess/Deficit (+/-) -12.5 (*)    PHOSPHORUS - Abnormal    Phosphorus 6.1 (*)    KETONE BETA-HYDROXYBUTYRATE QUANTITATIVE, RAPID - Abnormal    Ketone (Beta-Hydroxybutyrate) Quantitative 5.7 (*)    HEMOGLOBIN A1C - Abnormal    Hemoglobin A1C 13.2 (*)    GLUCOSE BY METER - Abnormal    GLUCOSE BY METER POCT 374 (*)    BASIC METABOLIC PANEL - Abnormal    Sodium 136      Potassium 4.5      Chloride 96 (*)     Carbon Dioxide (CO2) 12 (*)     Anion Gap 28 (*)     Urea Nitrogen 24.7 (*)     Creatinine 0.62      Calcium 9.5      Glucose 417 (*)     GFR Estimate >90     GLUCOSE BY METER - Abnormal    GLUCOSE BY METER POCT 377 (*)    MAGNESIUM - Normal    Magnesium 2.1     HEPATIC  FUNCTION PANEL - Normal    Protein Total 7.9      Albumin 4.6      Bilirubin Total 0.7      Alkaline Phosphatase 76      AST 25      ALT 11      Bilirubin Direct <0.20     LIPASE - Normal    Lipase 14     LACTIC ACID WHOLE BLOOD - Normal    Lactic Acid 1.5     GLUCOSE MONITOR NURSING POCT   GLUCOSE MONITOR NURSING POCT      Emergency Department Course & Assessments:  ED Course as of 01/30/23 2324 Mon Jan 30, 2023 1913 I obtained the history and examined the patient as noted above.    1957 I rechecked and updated the patient regarding plan for admission.   2043 I rechecked and updated the patient following basic metabolic panel results.          Interventions:  Medications   melatonin tablet 5 mg (has no administration in time range)   dextrose 50 % injection 25-50 mL (has no administration in time range)   insulin 1 unit/1 mL in NS (NovoLIN, HumuLIN Regular) drip -ED DKA algorithm ( Intravenous Rate/Dose Change 1/30/23 2302)   insulin 1 unit/1mL in saline (NovoLIN, HumuLIN Regular) drip - DKA algorithm (has no administration in time range)   glucose gel 15-30 g (has no administration in time range)     Or   dextrose 50 % injection 25-50 mL (has no administration in time range)     Or   glucagon injection 1 mg (has no administration in time range)   0.45% sodium chloride + KCl 20 mEq/L infusion (has no administration in time range)   dextrose 5% and 0.45% NaCl + KCl 20 mEq/L infusion (0 mLs Intravenous Hold 1/30/23 2304)   lidocaine 1 % 0.1-1 mL (has no administration in time range)   lidocaine (LMX4) cream (has no administration in time range)   sodium chloride (PF) 0.9% PF flush 3 mL (has no administration in time range)   sodium chloride (PF) 0.9% PF flush 3 mL (has no administration in time range)   melatonin tablet 1 mg (has no administration in time range)   acetaminophen (TYLENOL) tablet 975 mg (975 mg Oral Not Given 1/30/23 2304)   oxyCODONE IR (ROXICODONE) half-tab 2.5 mg (has no administration in time  range)   HYDROmorphone (DILAUDID) injection 0.2 mg (has no administration in time range)   melatonin tablet 3 mg (has no administration in time range)   senna-docusate (SENOKOT-S/PERICOLACE) 8.6-50 MG per tablet 1 tablet (has no administration in time range)     Or   senna-docusate (SENOKOT-S/PERICOLACE) 8.6-50 MG per tablet 2 tablet (has no administration in time range)   ondansetron (ZOFRAN ODT) ODT tab 4 mg (has no administration in time range)     Or   ondansetron (ZOFRAN) injection 4 mg (has no administration in time range)   nitroGLYcerin (NITROSTAT) sublingual tablet 0.4 mg (has no administration in time range)   0.9% sodium chloride BOLUS (0 mLs Intravenous Stopped 1/30/23 0810)        Independent Interpretation (X-rays, CTs, rhythm strip):  I independently reviewed the patient's hip CT. I agree with radiology's impressions.    Consultations/Discussion of Management or Tests:  1931 I spoke with Dr. Ino Stephenson, orthopedics, who recommended surgery.  2026 I spoke with Maris Romano PA-C, who accepted the patient for admission on behalf of Dr. Mcgregor.  2050 I spoke with Maris Romano to update her that the patient may be in DKA based on basic metabolic panel.  2055 DKA labs and insulin drip ordered.  Bed changed to [facility] per hospitalist recommendations.    Social Determinants of Health affecting care:  Supportive friends/family.     Disposition:  The patient was admitted to the hospital under the care of Dr. Mcgregor.     Impression & Plan    Medical Decision Making:  Patient presents after sustaining a fall yesterday as detailed above.  Initial x-ray was suggestive of femoral neck fracture but CT was recommended as such she was sent here from urgent care.  CT does indeed reveal a femoral neck fracture as detailed above.  I discussed this with the patient that she will likely require surgery and should stay in the hospital.  I informed her that I will be obtaining an EKG and basic labs.  She tells me she  is not any medications but then did admit that she is a type II diabetic but does not take the metformin because she does like the side effects and her last A1c was in the 13's.  She does not want any thing here for pain control either.  Her EKG showed sinus rhythm without signs of ischemia.  Her CBC was essentially unremarkable.  However her electrolyte panel showed a glucose in the 450s with a bicarb less than 10 and an anion gap of 31 all consistent with DKA.    The rest of the DKA labs as above did show an elevated beta hydroxybutyrate at 5.7.  A VBG showed a pH of 7.21 with a bicarb of 14.  Her A1c did come back elevated at 13.2.  She was started on the DKA protocol with insulin drip and frequent glucose monitoring and electrolyte sampling.  She did agree to this after admittedly seeming somewhat reluctant.  I did inform her this could be potentially life-threatening illness and that we need to correct this before conjointly with her hip.  Again she did agree to this plan.  Prior to the DKA coming back I did discuss the case with the surgeon and they plan to pin her in the morning. I did inform Dr. Stephenson of orthopedics that she was a poorly controlled diabetic and that there may be further issues which indeed we did find.  Hopefully these will be improved and stabilized in the morning and time for surgery.    Critical Care time:  was 45 minutes for this patient excluding procedures.    Diagnosis:    ICD-10-CM    1. Closed fracture of neck of right femur, initial encounter (H)  S72.001A Case Request: CLOSED REDUCTION, HIP, WITH PERCUTANEOUS PINNING     Case Request: CLOSED REDUCTION, HIP, WITH PERCUTANEOUS PINNING      2. Diabetic ketoacidosis without coma associated with type 2 diabetes mellitus (H)  E11.10           Scribe Disclosure:  PRISCA, Maribell Alford, am serving as a scribe at 9:19 PM on 1/30/2023 to document services personally performed by Mehrdad Hodge MD based on my observations and the  provider's statements to me.     1/30/2023   Mehrdad Hodge MD Walters, Brent Aaron, MD  01/30/23 1039

## 2023-01-31 NOTE — CONSULTS
[facility]    Orthopedics Consultation    Date of Admission:  1/30/2023    Assessment & Plan     PLAN:     Jennifer Presley  is a 72 year old female with a minimally displaced right femoral neck fracture after a fall.  We discussed risks and benefits of operative versus nonoperative intervention and expected recovery.  After thorough discussion, I recommended a right hip pinning to stabilize her fracture and allow for early function and weightbearing.  She was in agreement and elected to proceed.    Plan for a right hip pinning.  N.p.o. for surgery.    Principal Problem:    Closed fracture of neck of right femur, initial encounter (H)    SHANA MARTINEZ MD     Code Status    Full Code    Reason for Consult   Reason for consult: Right hip pain      Primary Care Physician   Andrez Soares    Chief Complaint   Right hip pain    History is obtained from the patient     History of Present Illness   Jennifer Presley is a 72 year old female who presents with right hip pain after a fall on January 29.  She reports falling while carrying a package.  She has been able to ambulate with support but cannot place full weight on her right leg.  She was brought to [facility] [place] where imaging was obtained which revealed a minimally displaced right femoral neck fracture.  She denies any other pain elsewhere in her body.  She locates pain to her right hip.  She denies any numbness or paresthesias.    Past Medical History   I have reviewed this patient's medical history and updated it with pertinent information if needed.   Past Medical History:   Diagnosis Date     B-COMPLEX DEFIC NEC 2/28/2008     DIABETES MELLITUS TYPE II-UNCOMPL 3/5/2007    Diagnosed in 1999       Past Surgical History   I have reviewed this patient's surgical history and updated it with pertinent information if needed.  Past Surgical History:   Procedure Laterality Date     HC REMOVE TONSILS/ADENOIDS,<13  Y/O      T & A <13y.o.     ZZC NONSPECIFIC PROCEDURE      fourth degree laceration, postpartum       Prior to Admission Medications   None     Allergies   No Known Allergies    Social History   I have reviewed this patient's social history and updated it with pertinent information if needed. Jennifer Presley  reports that she has never smoked. She has never used smokeless tobacco. She reports current alcohol use of about 1.7 standard drinks per week. She reports that she does not use drugs.    Family History   I have reviewed this patient's family history and updated it with pertinent information if needed.   Family History   Problem Relation Age of Onset     C.A.D. Mother         62 with MI     Breast Cancer Mother         cause of death     Cancer Mother          of lung cancer     C.A.D. Brother         multiple stents.     Cancer Father         lung cancer,  age 56     Cancer Maternal Aunt         breast cancer     Cancer Paternal Aunt         breast cancer     Diabetes Paternal Grandmother         diabetes,  in late 30s       Review of Systems   The 10 point Review of Systems is negative other than noted in the HPI or here.     Physical Exam   Temp: 97.4  F (36.3  C) Temp src: Temporal BP: 96/67 Pulse: 92   Resp: 18 SpO2: 100 % O2 Device: None (Room air)    Vital Signs with Ranges  Temp:  [97.4  F (36.3  C)-98.6  F (37  C)] 97.4  F (36.3  C)  Pulse:  [] 92  Resp:  [18-20] 18  BP: ()/() 96/67  SpO2:  [95 %-100 %] 100 %  136 lbs 0 oz    Constitutional: awake, alert, cooperative, no apparent distress, and appears stated age  Eyes: extra-ocular muscles intact, no scleral icterus  ENT: normocepalic, atraumatic without obvious abnormality  Respiratory: no increased work of breathing, symmetric chest wall expansion    MSK:  Right lower extremity:  Skin is intact.  Diffuse swelling around the hip  Tenderness to palpation over the GT  Hip pain with logroll  Hip range of motion  limited secondary to pain  Sensations intact to light touch in the superficial peroneal, deep peroneal, tibial nerve distributions  FHL, EHL, dorsiflexion, plantarflexion intact  Dorsalis pedis pulse 2+, good capillary refill    Left lower extremity:  Skin is intact.  No tenderness to palpation over the long bones or joints.  No pain with range of motion of the hip, knee, ankle  Sensations intact to light touch in the superficial peroneal, deep peroneal, tibial nerve distributions  FHL, EHL, dorsiflexion, plantarflexion intact  Dorsalis pedis pulse 2+, good capillary refill      Data   Most Recent 3 CBC's:Recent Labs   Lab Test 01/31/23  0552 01/30/23  1940   WBC 8.1 10.2   HGB 12.2 13.4   MCV 97 101*    207     Most Recent 3 BMP's:Recent Labs   Lab Test 01/31/23  0610 01/31/23  0552 01/31/23  0459 01/31/23  0300 01/31/23  0206 01/30/23  2257 01/30/23  2221   NA  --  139  --   --  139  --  136   POTASSIUM  --  3.9  --   --  4.0  --  4.5   CHLORIDE  --  104  --   --  104  --  96*   CO2  --  19*  --   --  15*  --  12*   BUN  --  26.5*  --   --  24.1*  --  24.7*   CR  --  0.60  --   --  0.59  --  0.62   ANIONGAP  --  16*  --   --  20*  --  28*   FRAN  --  9.5  --   --  8.5*  --  9.5   GLC 99 147* 200*   < > 165*   < > 417*    < > = values in this interval not displayed.     Most Recent 3 INR's:No lab results found.

## 2023-01-31 NOTE — PLAN OF CARE
Plan of care    Jennifer Presley is a 73 y/o female with a nondisplaced femoral neck fracture.  CT/Xrays reviewed.     Plan for a right hip pinning tomorrow. NPO at midnight    Full Consult to follow    SHANA MARTINEZ MD

## 2023-01-31 NOTE — PHARMACY-ADMISSION MEDICATION HISTORY
Admission medication history interview status for this patient is complete. See [facility] admission navigator for allergy information, prior to admission medications and immunization status.     Medication history interview done, indicate source(s): Patient  Medication history resources (including written lists, pill bottles, clinic record):[facility] Everywhere  Pharmacy: Discharge pharmacy    Changes made to PTA medication list:  Added: none  Changed: none  Reported as Not Taking: none  Removed: ibuprofen, tizanidine    Actions taken by pharmacist (provider contacted, etc):sticky note to provider     Additional medication history information: patient is pleasant during the interview, reported that she is not fond of taking medications in general. Patient was prescribed metformin in the past but she stopped because of bothersome side effects. Per patient, she was then prescribed other antidiabetic medications including Actos, glimepiride, and Jardiance but she eventually stopped taking all of them.    Medication reconciliation/reorder completed by provider prior to medication history?  N   (Y/N)     Medication Affordability:  Not including over the counter (OTC) medications, was there a time in the past 12 months when you did not take your medications as prescribed because of cost?: No     Prior to Admission medications    Not on File

## 2023-01-31 NOTE — ANESTHESIA PREPROCEDURE EVALUATION
Anesthesia Pre-Procedure Evaluation    Patient: Jennifer Presley   MRN: [id number] : 1950        Procedure : Procedure(s):  CLOSED REDUCTION, HIP, WITH PERCUTANEOUS PINNING          Past Medical History:   Diagnosis Date     B-COMPLEX DEFIC NEC 2008     DIABETES MELLITUS TYPE II-UNCOMPL 3/5/2007    Diagnosed in       Past Surgical History:   Procedure Laterality Date     HC REMOVE TONSILS/ADENOIDS,<14 Y/O      T & A <13y.o.     ZZC NONSPECIFIC PROCEDURE  1977    fourth degree laceration, postpartum      No Known Allergies   Social History     Tobacco Use     Smoking status: Never     Smokeless tobacco: Never   Substance Use Topics     Alcohol use: Yes     Alcohol/week: 1.7 standard drinks      Wt Readings from Last 1 Encounters:   23 61.7 kg (136 lb)        Anesthesia Evaluation   Pt has had prior anesthetic. Type: General.    No history of anesthetic complications       ROS/MED HX  ENT/Pulmonary:  - neg pulmonary ROS     Neurologic:  - neg neurologic ROS     Cardiovascular:     (+) hypertension-----    METS/Exercise Tolerance:     Hematologic:  - neg hematologic  ROS     Musculoskeletal:   (+) fracture, Fracture location: RLE,     GI/Hepatic:  - neg GI/hepatic ROS     Renal/Genitourinary:  - neg Renal ROS     Endo:     (+) type II DM, Last HgA1c: 13.2, Using insulin, Previously admitted for DM/DKA.     Psychiatric/Substance Use:  - neg psychiatric ROS     Infectious Disease:  - neg infectious disease ROS     Malignancy:  - neg malignancy ROS     Other:  - neg other ROS          Physical Exam    Airway        Mallampati: II   TM distance: > 3 FB   Neck ROM: full   Mouth opening: > 3 cm    Respiratory Devices and Support         Dental           Cardiovascular   cardiovascular exam normal          Pulmonary   pulmonary exam normal                OUTSIDE LABS:  CBC:   Lab Results   Component Value Date    WBC 8.1 2023    WBC 10.2 2023    HGB 12.2 2023    HGB 13.4  01/30/2023    HCT 37.0 01/31/2023    HCT 42.3 01/30/2023     01/31/2023     01/30/2023     BMP:   Lab Results   Component Value Date     01/31/2023     01/31/2023    POTASSIUM 3.9 01/31/2023    POTASSIUM 4.0 01/31/2023    CHLORIDE 104 01/31/2023    CHLORIDE 104 01/31/2023    CO2 19 (L) 01/31/2023    CO2 15 (L) 01/31/2023    BUN 26.5 (H) 01/31/2023    BUN 24.1 (H) 01/31/2023    CR 0.60 01/31/2023    CR 0.59 01/31/2023    GLC 99 01/31/2023     (H) 01/31/2023     COAGS: No results found for: PTT, INR, FIBR  POC: No results found for: BGM, HCG, HCGS  HEPATIC:   Lab Results   Component Value Date    ALBUMIN 4.6 01/30/2023    PROTTOTAL 7.9 01/30/2023    ALT 11 01/30/2023    AST 25 01/30/2023    ALKPHOS 76 01/30/2023    BILITOTAL 0.7 01/30/2023     OTHER:   Lab Results   Component Value Date    LACT 1.5 01/30/2023    A1C 13.2 (H) 01/30/2023    FRAN 9.5 01/31/2023    PHOS 2.9 01/31/2023    MAG 2.1 01/30/2023    LIPASE 14 01/30/2023    TSH 1.95 04/29/2019       Anesthesia Plan    ASA Status:  3   NPO Status:  NPO Appropriate    Anesthesia Type: General.     - Airway: ETT   Induction: Intravenous, Propofol.   Maintenance: Balanced.        Consents    Anesthesia Plan(s) and associated risks, benefits, and realistic alternatives discussed. Questions answered and patient/representative(s) expressed understanding.    - Discussed:     - Discussed with:  Patient         Postoperative Care    Pain management: IV analgesics, Oral pain medications, Multi-modal analgesia.   PONV prophylaxis: Ondansetron (or other 5HT-3), Dexamethasone or Solumedrol     Comments:                Yoandy Schafer MD

## 2023-01-31 NOTE — PROGRESS NOTES
[facility]    Medicine Progress Note - Hospitalist Service    Date of Admission:  1/30/2023    Assessment & Plan   Jennifer Presley is a 72 year old female with PMH significant for uncontrolled DM2 who presents to the ED for evaluation after a fall on 1/29 with concern for ongoing right hip and groin pain.      ED workup reveals:  glucose of 457, CBC unremarkable, VBG shows pH of 7.21, , bicarbonate of 14, qualitative ketones of 5.7, EKG shows rate of 97 bpm, x-ray of right hip at urgent care shows minimally displaced subcapital right femoral neck fracture suspected, and CT of right hip in ED shows acute impacted fracture of the right femoral neck with mild valgus deformity.      Past medical history is significant for untreated diabetes mellitus and peripheral neuropathy.     #DKA  #Uncontrolled DM2 with peripheral neuropathy  #Pseudohyponatremia  #Hyperphosphatemia:   -- She was treated overnight with IV insulin and blood glucose normalized.  -- She was on metformin many years ago but did not like side effects.  She has not been treated for diabetes for least 5 years..  -She is agreeable to trying subcutaneous insulin.  --We will start with Lantus and sliding scale insulin.  --May need to return to insulin drip if uncontrolled but we will try to manage aggressively with subcu insulin.     #Right femoral neck fracture  #Sp mechanical fall: fell on 1/29 and attempting to bring in an 18 pound package at her doorstep where she lost her balance resulting in her falling backwards into her screen door.  -- She had surgical repair today (1/30)  --PT and OT  --Pain control  --DVT prophylaxis     #HLD: most recent lipid panel in 2019 showed relative 56, triglycerides of 206, HDL 44, and LDL of 171.  - recheck lipid panel in AM  - recommend outpatient f/u to discuss management pending labs           Clinically Significant Risk Factors Present on Admission                # Anion Gap Metabolic  Acidosis: Highest Anion Gap = 31 mmol/L in last 2 days, will monitor and treat as appropriate      # Hypertension: home medication list includes antihypertensive(s)                Diet: Advance Diet as Tolerated: Regular Diet Adult    DVT Prophylaxis: Pneumatic Compression Devices  Roman Catheter: Not present  Lines: None     Cardiac Monitoring: None  Code Status: Full Code      Clinically Significant Risk Factors Present on Admission             # Anion Gap Metabolic Acidosis: Highest Anion Gap = 31 mmol/L in last 2 days, will monitor and treat as appropriate          # DMII: A1C = 13.2 % (Ref range: <5.7 %) within past 3 months            Disposition Plan      Expected Discharge Date: 02/02/2023                 To home or TCU depending on progress and when BG controlled.      Corwin Wilkerson MD  Hospitalist Service  [facility]  Securely message with [employer] (more info)  Text page via [employer] Paging/Directory   ______________________________________________________________________    Interval History   Feels well after surgery.  Denies any pain.    Physical Exam   Vital Signs: Temp: 98.5  F (36.9  C) Temp src: Temporal BP: 130/70 Pulse: 98   Resp: 13 SpO2: 98 % O2 Device: None (Room air) Oxygen Delivery: 2 LPM  Weight: 136 lbs 0 oz      Vital signs reviewed  General:  Alert, calm, NAD  CV: regular rate and rhythm, no murmurs or rubs  Lungs:  Clear to ascultation bilaterally, normal respiratory effort  HEENT:  Pupil round, equal, conjuctivae, sclerae and lids normal, neck is supple  Abdomen:  Soft, nontender, nondistended, no masses, normal bowel sounds  Extremities:  No edema  Neuro: normal strength and sensation in all 4 extremities, cranial nerves grossly intact  Psychiatric:  Mood and affect within normal limits      Medical Decision Making       40 MINUTES SPENT BY ME on the date of service doing chart review, history, exam, documentation & further activities per the note.      Data    Bicarb 14  bg 306

## 2023-01-31 NOTE — ED NOTES
Patient transported to PACU with Sesar TILLMAN. All belongings sent, including purse and cell phone. Insulin infusing.

## 2023-01-31 NOTE — PROGRESS NOTES
Blood glucose 144 on return to pacu form the OR. Dr Schafer notified and Dr Plaza. Per Dr Plaza, pt can go to floor, does not need to restart insulin DKA drip.  Will continue to monitor

## 2023-01-31 NOTE — ED NOTES
DATE:  1/30/2023   TIME OF RECEIPT FROM LAB:  2130  LAB TEST:  beta ketone  LAB VALUE:  5.7  RESULTS GIVEN WITH READ-BACK TO (PROVIDER):  Mehrdad Hodge MD  TIME LAB VALUE REPORTED TO PROVIDER:   2133

## 2023-01-31 NOTE — ED NOTES
DATE:  1/30/2023   TIME OF RECEIPT FROM LAB:  2040   LAB TEST:  carbon dioxide  LAB VALUE:  10  RESULTS GIVEN WITH READ-BACK TO (PROVIDER):  Mehrdad Hodge MD  TIME LAB VALUE REPORTED TO PROVIDER:   2045  In room with patient. Patient is in DKA, lab orders received.

## 2023-01-31 NOTE — PLAN OF CARE
End of Shift Summary  For vital signs and complete assessments, please see documentation flowsheets.     Pertinent assessments: Pt admitted to floor from PACU in stable condition. POD#0 R closed hip reduction w/ pinning. Tolerating pain well, ice pack and tylenol given. WBAT, pivots to commode with 1 assist. Tolerating regular diet. VSS. BG elevated to 300s, insulin given. Educated on insulin administration, needs further reinforcement and practice.     Major Shift Events: POD#0 R closed hip reduction with pinning    Treatment Plan: Continue pain control, post op abx, PT OT, diabetes education

## 2023-02-01 ENCOUNTER — APPOINTMENT (OUTPATIENT)
Dept: PHYSICAL THERAPY | Facility: CLINIC | Age: 73
DRG: 480 | End: 2023-02-01
Attending: STUDENT IN AN ORGANIZED HEALTH CARE EDUCATION/TRAINING PROGRAM
Payer: COMMERCIAL

## 2023-02-01 LAB
ANION GAP SERPL CALCULATED.3IONS-SCNC: 15 MMOL/L (ref 7–15)
BUN SERPL-MCNC: 20.2 MG/DL (ref 8–23)
CALCIUM SERPL-MCNC: 8.7 MG/DL (ref 8.8–10.2)
CHLORIDE SERPL-SCNC: 102 MMOL/L (ref 98–107)
CREAT SERPL-MCNC: 0.44 MG/DL (ref 0.51–0.95)
DEPRECATED HCO3 PLAS-SCNC: 18 MMOL/L (ref 22–29)
GFR SERPL CREATININE-BSD FRML MDRD: >90 ML/MIN/1.73M2
GLUCOSE BLDC GLUCOMTR-MCNC: 220 MG/DL (ref 70–99)
GLUCOSE BLDC GLUCOMTR-MCNC: 235 MG/DL (ref 70–99)
GLUCOSE BLDC GLUCOMTR-MCNC: 276 MG/DL (ref 70–99)
GLUCOSE SERPL-MCNC: 217 MG/DL (ref 70–99)
HGB BLD-MCNC: 10.4 G/DL (ref 11.7–15.7)
POTASSIUM SERPL-SCNC: 3.8 MMOL/L (ref 3.4–5.3)
SODIUM SERPL-SCNC: 135 MMOL/L (ref 136–145)

## 2023-02-01 PROCEDURE — 97161 PT EVAL LOW COMPLEX 20 MIN: CPT | Mod: GP

## 2023-02-01 PROCEDURE — 250N000013 HC RX MED GY IP 250 OP 250 PS 637: Performed by: INTERNAL MEDICINE

## 2023-02-01 PROCEDURE — 120N000001 HC R&B MED SURG/OB

## 2023-02-01 PROCEDURE — 85018 HEMOGLOBIN: CPT | Performed by: STUDENT IN AN ORGANIZED HEALTH CARE EDUCATION/TRAINING PROGRAM

## 2023-02-01 PROCEDURE — 250N000013 HC RX MED GY IP 250 OP 250 PS 637: Performed by: STUDENT IN AN ORGANIZED HEALTH CARE EDUCATION/TRAINING PROGRAM

## 2023-02-01 PROCEDURE — 99232 SBSQ HOSP IP/OBS MODERATE 35: CPT | Performed by: INTERNAL MEDICINE

## 2023-02-01 PROCEDURE — 36415 COLL VENOUS BLD VENIPUNCTURE: CPT | Performed by: STUDENT IN AN ORGANIZED HEALTH CARE EDUCATION/TRAINING PROGRAM

## 2023-02-01 PROCEDURE — 97530 THERAPEUTIC ACTIVITIES: CPT | Mod: GP

## 2023-02-01 PROCEDURE — 97116 GAIT TRAINING THERAPY: CPT | Mod: GP

## 2023-02-01 PROCEDURE — 80048 BASIC METABOLIC PNL TOTAL CA: CPT | Performed by: INTERNAL MEDICINE

## 2023-02-01 RX ORDER — LISINOPRIL 10 MG/1
10 TABLET ORAL DAILY
Status: DISCONTINUED | OUTPATIENT
Start: 2023-02-01 | End: 2023-02-03 | Stop reason: HOSPADM

## 2023-02-01 RX ORDER — CARBOXYMETHYLCELLULOSE SODIUM 5 MG/ML
2 SOLUTION/ DROPS OPHTHALMIC 4 TIMES DAILY PRN
Status: DISCONTINUED | OUTPATIENT
Start: 2023-02-01 | End: 2023-02-03 | Stop reason: HOSPADM

## 2023-02-01 RX ADMIN — OXYCODONE HYDROCHLORIDE 5 MG: 5 TABLET ORAL at 19:50

## 2023-02-01 RX ADMIN — SENNOSIDES AND DOCUSATE SODIUM 1 TABLET: 50; 8.6 TABLET ORAL at 21:22

## 2023-02-01 RX ADMIN — INSULIN ASPART 4 UNITS: 100 INJECTION, SOLUTION INTRAVENOUS; SUBCUTANEOUS at 18:24

## 2023-02-01 RX ADMIN — ACETAMINOPHEN 650 MG: 325 TABLET, FILM COATED ORAL at 21:22

## 2023-02-01 RX ADMIN — ACETAMINOPHEN 650 MG: 325 TABLET, FILM COATED ORAL at 06:46

## 2023-02-01 RX ADMIN — ACETAMINOPHEN 650 MG: 325 TABLET, FILM COATED ORAL at 15:18

## 2023-02-01 RX ADMIN — LISINOPRIL 10 MG: 10 TABLET ORAL at 10:11

## 2023-02-01 RX ADMIN — INSULIN ASPART 4 UNITS: 100 INJECTION, SOLUTION INTRAVENOUS; SUBCUTANEOUS at 11:55

## 2023-02-01 ASSESSMENT — ACTIVITIES OF DAILY LIVING (ADL)
ADLS_ACUITY_SCORE: 18
ADLS_ACUITY_SCORE: 20
ADLS_ACUITY_SCORE: 18
ADLS_ACUITY_SCORE: 20
DEPENDENT_IADLS:: INDEPENDENT
ADLS_ACUITY_SCORE: 18
ADLS_ACUITY_SCORE: 20
ADLS_ACUITY_SCORE: 20
ADLS_ACUITY_SCORE: 18
ADLS_ACUITY_SCORE: 21
ADLS_ACUITY_SCORE: 20
ADLS_ACUITY_SCORE: 21
ADLS_ACUITY_SCORE: 18

## 2023-02-01 NOTE — PROGRESS NOTES
02/01/23 0800   Appointment Info   Signing Clinician's Name / Credentials (PT) Gneny Arevalo, PT, DPT   Rehab Comments (PT) WBAT R LE   Living Environment   People in Home alone   Current Living Arrangements house   Home Accessibility stairs within home   Number of Stairs, Within Home, Primary greater than 10 stairs  (14)   Stair Railings, Within Home, Primary railing on left side (ascending)   Transportation Anticipated car, drives self   Living Environment Comments Patient lives alone in a condo.  No steps to enter but 14 steps up to bedroom.  Patient states she could stay on the main floor if needed, has half bath and could sponge bathe.  Patient sleeps in a standard bed, exits to left.  She has a tub-shower upstairs, one grab bar in bathroom but no seat bench.  Patient drives at baseline, has a neighbor that could transport her on Thursday.   Self-Care   Usual Activity Tolerance good   Current Activity Tolerance moderate   Equipment Currently Used at Home none   Fall history within last six months yes   Number of times patient has fallen within last six months 2   Activity/Exercise/Self-Care Comment Patient reports baseline independence with dressing, bathing, and toileting.  Patient ambulates independently.  Patient reportedly quite sedentary but does get her own groceries.  She has a son living in [place].   General Information   Onset of Illness/Injury or Date of Surgery 01/30/23   Referring Physician Ino Stephenson MD   Patient/Family Therapy Goals Statement (PT) Patient would like to get back to walking.   Pertinent History of Current Problem (include personal factors and/or comorbidities that impact the POC) 72 year old female with PMH significant for uncontrolled DM2 who presents to the ED for evaluation after a fall on 1/29 with concern for ongoing right hip and groin pain.   Existing Precautions/Restrictions fall;weight bearing   Weight-Bearing Status - RLE weight-bearing as tolerated   Cognition    Affect/Mental Status (Cognition) WFL   Orientation Status (Cognition) oriented x 4   Follows Commands (Cognition) [facility]   Pain Assessment   Patient Currently in Pain Yes, see Vital Sign flowsheet  (Denies right hip pain at rest, increased pain with movement)   Integumentary/Edema   Integumentary/Edema Comments Age-related skin changes, right hip surgical incision   Posture    Posture Forward head position   Range of Motion (ROM)   Range of Motion ROM deficits secondary to pain   ROM Comment No right hip precautions, AROM limited by pain   Strength (Manual Muscle Testing)   Strength (Manual Muscle Testing) Able to perform L SLR   Strength Comments Decreased strength R LE secondary to post op   Bed Mobility   Comment, (Bed Mobility) SBA with use of bed rails from supine to sit   Transfers   Comment, (Transfers) MinAx1 STS to 2WW.   Gait/Stairs (Locomotion)   [place] Level (Gait) contact guard   Assistive Device (Gait) walker, front-wheeled   Distance in Feet 5'   Distance in Feet (Gait) 30'+30'   Pattern (Gait) step-through   Deviations/Abnormal Patterns (Gait) antalgic;base of support, narrow;gait speed decreased   Balance   Balance Comments impaired dynamic balance with hx of neuropathy, history of falls and post surgical   Sensory Examination   Sensory Perception Comments Baseline B LE neuropathy   Clinical Impression   Criteria for Skilled Therapeutic Intervention Yes, treatment indicated   PT Diagnosis (PT) Impaired functional mobility   Influenced by the following impairments increased pain, decreased strength and ROM.   Functional limitations due to impairments Impaired independence with bed mobility, transfers, ambulation, stairs.   Clinical Presentation (PT Evaluation Complexity) Stable/Uncomplicated   Clinical Presentation Rationale clinical rationale, social support PMHx   Clinical Decision Making (Complexity) low complexity   Planned Therapy Interventions (PT) balance training;bed mobility  training;gait training;home exercise program;ROM (range of motion);stair training;strengthening;transfer training;progressive activity/exercise;cryotherapy;neuromuscular re-education;postural re-education;patient/family education   Anticipated Equipment Needs at Discharge (PT) walker, rolling   Risk & Benefits of therapy have been explained patient   PT Total Evaluation Time   PT Eval, Low Complexity Minutes (49301) 12   Physical Therapy Goals   PT Frequency Daily   PT Predicted Duration/Target Date for Goal Attainment 02/04/23   PT Goals Bed Mobility;Transfers;Gait;Stairs   PT: Bed Mobility Independent;Supine to/from sit;Rolling   PT: Transfers Modified independent;Sit to/from stand;Assistive device   PT: Gait Assistive device;100 feet;Modified independent   PT: Stairs Supervision/stand-by assist;Greater than 10 stairs;Rail on left   Interventions   Interventions Quick Adds Gait Training;Therapeutic Activity   Therapeutic Activity   Therapeutic Activities: dynamic activities to improve functional performance Minutes (22516) 24   Symptoms Noted During/After Treatment Fatigue;Increased pain   Treatment Detail/Skilled Intervention Pt greeted in supine. Pt completed bed mobility supine to sit with SBA using UE support on bed rails. Pt required self UE support to help move R LE in bed. Pt complete 5 STS total today with MinAx1 from bed, toliet and WC. Pt required VC for safe hand placement on armrests, grab bars and bed during transfers. Pt complete toilet transfer with minAx1 and use of UE grab bars. Educated pt that movement and weight bearing with progress healing process. Pt verbalized understanding. Pt left seated in recliner, chair alarm activated, call light and all needs within reach.   Gait Training   Gait Training Minutes (07604) 29   Symptoms Noted During/After Treatment (Gait Training) fatigue;increased pain   Treatment Detail/Skilled Intervention Pt ambulated total of 60' today with 2WW and CGA. Pt required  constant VC and facilitation for safe use of 2WW during turns and transfers. Pt able to maintain an erect posture and properly weight bear through UE's. Pt completed stair navigation 4 steps x2 with CGA. First set pt required bilateral upper extremity support using both hand rails. Pt completed second set using only L railing with bilateral support with CGA to mimic home set up. Pt demoed appropriate stair navigation post op.   [place] Level (Gait Training) contact guard   Weight Bearing (Gait Training) weight-bearing as tolerated   Assistive Device (Gait Training) rolling walker   Level of [place] (Stairs) contact guard   PT Discharge Planning   PT Plan Review safe use of walker, stairs and progress ambulation distance. If d/c home, needs FWW.   PT Discharge Recommendation (DC Rec) Transitional Care Facility   PT Rationale for DC Rec Pt currently below functional mobility baseline. Pt currently lives alone with no social support and was previously IND. Pt does have 14 stairs ascending towards bedroom and bathroom with shower. Pt able to ambulate ~60ft today with 2WW and CGA and able to navigate 8 steps using UE support and railings. Additionally, does not have transportation after Thursday. Given pt's situation, pt may benefit from further rehabilitation at TCU in order to progress strength and functional mobility to ensure safe return home.  May be appropriate to return home if progresses to IND/mod I for bed mob, transfers, and gait.  HHPT would be needed to promote return to PLOF.   PT Brief overview of current status CGA-Rusty transfers, CGA ambulation   Total Session Time   Timed Code Treatment Minutes 53   Total Session Time (sum of timed and untimed services) 65

## 2023-02-01 NOTE — PROGRESS NOTES
[facility]    Medicine Progress Note - Hospitalist Service    Date of Admission:  1/30/2023    Assessment & Plan   Jennifer Presley is a 72 year old female with PMH significant for uncontrolled DM2 who presented to the ED for evaluation after a fall on 1/29 with concern for ongoing right hip and groin pain.      ED workup revealed:  glucose of 457, CBC unremarkable, VBG shows pH of 7.21, , bicarbonate of 14, qualitative ketones of 5.7, EKG shows rate of 97 bpm, x-ray of right hip at urgent care shows minimally displaced subcapital right femoral neck fracture suspected, and CT of right hip in ED showed acute impacted fracture of the right femoral neck with mild valgus deformity.      Past medical history is significant for untreated diabetes mellitus and peripheral neuropathy.     #DKA  #Uncontrolled DM2 with peripheral neuropathy  #Pseudohyponatremia  #Hyperphosphatemia:   -- She was treated overnight with IV insulin and blood glucose normalized.  -- She was on metformin many years ago but did not like side effects.  She has not been treated for diabetes for least 5 years..  Hemoglobin A1c was greater than 13  -She is agreeable to trying subcutaneous insulin.  --We will start with Lantus and sliding scale insulin.  Lantus increased on February 1 to 15 unit SQ nightly.  Patient is very resistant to medications per se     #Right femoral neck fracture  #Sp mechanical fall: fell on 1/29 and attempting to bring in an 18 pound package at her doorstep where she lost her balance resulting in her falling backwards into her screen door.  -- She had surgical repair on January 30  --PT and OT  --Pain control  --DVT prophylaxis with aspirin as per orthopedics     #HLD: most recent lipid panel in 2019 showed relative 56, triglycerides of 206, HDL 44, and LDL of 171.  Refusing to be engaged in any kind of medications for the same     #CODE STATUS  Patient was very clear that she wants to be  DNR/DNI.  She tells me that her son lives in [place] and she does not want to be a burden on anybody  She seems to be of sound mind and clear thought since appropriate which has not been documented      Clinically Significant Risk Factors Present on Admission                # Anion Gap Metabolic Acidosis: Highest Anion Gap = 31 mmol/L in last 2 days, will monitor and treat as appropriate      # Hypertension: home medication list includes antihypertensive(s)                Diet: Moderate Consistent Carb (60 g CHO per Meal) Diet    DVT Prophylaxis: Pneumatic Compression Devices  Roman Catheter: Not present  Lines: None     Cardiac Monitoring: None  Code Status: Full Code      Clinically Significant Risk Factors             # Anion Gap Metabolic Acidosis: Highest Anion Gap = 31 mmol/L in last 2 days, will monitor and treat as appropriate            # DMII: A1C = 13.2 % (Ref range: <5.7 %) within past 3 months, PRESENT ON ADMISSION          Disposition Plan      Expected Discharge Date: 02/02/2023                 To TCU depending based on bed availability.  Patient lives alone and has no social support structure      Macie Medellin MD  Hospitalist Service  [facility]  Securely message with [employer] (more info)  Text page via [employer] Paging/Directory   ______________________________________________________________________    Interval History   Feels well after surgery.  Denies any pain.    Physical Exam   Vital Signs: Temp: 98  F (36.7  C) Temp src: Axillary BP: (!) 151/62 Pulse: 77   Resp: 16 SpO2: 98 % O2 Device: None (Room air) Oxygen Delivery: 2 LPM  Weight: 136 lbs 0 oz      Vital signs reviewed  General:  Alert, calm, NAD slender built  CV: regular rate and rhythm, no murmurs or rubs  Lungs:  Clear to ascultation bilaterally, normal respiratory effort  HEENT:  Pupil round, equal, conjuctivae, sclerae and lids normal, neck is supple  Abdomen:  Soft, nontender, nondistended, no masses, normal bowel  sounds  Extremities:  No edema surgical incision is intact  Neuro: normal strength and sensation in all 4 extremities, cranial nerves grossly intact  Psychiatric:  Mood and affect within normal limits      Medical Decision Making     40 MINUTES SPENT BY ME on the date of service doing chart review, history, exam, documentation & further activities per the note.        acetaminophen  975 mg Oral Q8H     aspirin  325 mg Oral Daily     insulin aspart  1-10 Units Subcutaneous TID AC     insulin aspart  1-7 Units Subcutaneous At Bedtime     insulin glargine  15 Units Subcutaneous At Bedtime     lisinopril  10 mg Oral Daily     polyethylene glycol  17 g Oral Daily     senna-docusate  1 tablet Oral BID     sodium chloride (PF)  3 mL Intracatheter Q8H     sodium chloride (PF)  3 mL Intracatheter Q8H

## 2023-02-01 NOTE — PROGRESS NOTES
Orthopedic Surgery  Jennifer A Fernandez  02/01/2023     Admit Date:  1/30/2023  POD: 1 Day Post-Op   Procedure(s):  In-situ percutaneous screw fixation, right valgus impacted femoral neck fracture    Alert and oriented.   Patient resting comfortably in bed.    Pain controlled.  Tolerating oral intake.    Denies nausea or vomiting  Denies chest pain or shortness of breath    Temp:  [97.6  F (36.4  C)-98.6  F (37  C)] 98  F (36.7  C)  Pulse:  [77-92] 88  Resp:  [13-18] 16  BP: (104-151)/(55-69) 135/69  SpO2:  [95 %-100 %] 99 %    Dressing is clean, dry, and intact.   Minimal erythema of the surrounding skin.   Bilateral calves are soft, non-tender.  Right lower extremity is NVI.  Sensation intact bilateral lower extremities  Patient able to resist dorsi and plantar flexion bilaterally  +Dp pulse    Labs:  Recent Labs   Lab Test 02/01/23  0624 01/31/23  0552 01/30/23  1940   WBC  --  8.1 10.2   HGB 10.4* 12.2 13.4   PLT  --  194 207       1. PLAN:   Continue ASA 325mg daily for DVT prophylaxis.     Mobilize with PT/OT    WBAT Right LE with walker/gait aide.     Continue current pain regiment.   Dressings: Keep intact.  Change if >60% saturated or peeling off.    Follow-up: 2 weeks post-op with Dr. DEWAYNE Stephenson team    2. Disposition   Anticipate d/c to TCU when medically cleared and progressing in PT.    Kandis Parish PA-C

## 2023-02-01 NOTE — PROGRESS NOTES
End of Shift Summary  For vital signs and complete assessments, please see documentation flowsheets.     Pertinent assessments: A&O x4. VSS. HS regular. Pain, but refusing anything more than tylenol & ice. LS clear. Up A1 to bedside commode. , 232, 201, 186- trending down.     Major Shift Events: Uneventful.     Treatment Plan: Continue pain control, post op abx, PT OT, diabetes education, bs checks q1 hour until 100-150.     Bedside Nurse: Malgorzata Hernández RN

## 2023-02-01 NOTE — CONSULTS
Care Management Initial Consult    General Information  Assessment completed with: Patient,    Type of CM/SW Visit: Initial Assessment    Primary Care Provider verified and updated as needed:     Readmission within the last 30 days:        Reason for Consult: discharge planning  Advance Care Planning:            Communication Assessment  Patient's communication style: spoken language (English or Bilingual)    Hearing Difficulty or Deaf: no   Wear Glasses or Blind: no    Cognitive  Cognitive/Neuro/Behavioral: WDL  Level of Consciousness: alert  Arousal Level: arouses to voice  Orientation: oriented x 4  Mood/Behavior: calm  Best Language: 0 - No aphasia  Speech: clear    Living Environment:   People in home: alone     Current living Arrangements: condominium      Able to return to prior arrangements:         Family/Social Support:  Care provided by: self  Provides care for: no one     Neighbor          Description of Support System: Supportive         Current Resources:   Patient receiving home care services: No     Community Resources: None  Equipment currently used at home: none  Supplies currently used at home:      Employment/Financial:  Employment Status:          Financial Concerns:             Lifestyle & Psychosocial Needs:  Social Determinants of Health     Tobacco Use: Low Risk      Smoking Tobacco Use: Never     Smokeless Tobacco Use: Never     Passive Exposure: Not on file   Alcohol Use: Not on file   Financial Resource Strain: Not on file   Food Insecurity: Not on file   Transportation Needs: Not on file   Physical Activity: Not on file   Stress: Not on file   Social Connections: Not on file   Intimate Partner Violence: Not on file   Depression: Not on file   Housing Stability: Not on file       Functional Status:  Prior to admission patient needed assistance:   Dependent ADLs:: Independent  Dependent IADLs:: Independent       Mental Health Status:          Chemical Dependency Status:                 Values/Beliefs:  Spiritual, Cultural Beliefs, Yazidism Practices, Values that affect care:                 Additional Information:  SW met with patient to discuss discharge planning. Patient lived alone and was independent prior. She shared she has a neighbor who has limited ability to help & a son who lives out of state. Patient voiced agreement for the recommendation for a TCU at discharge. She requested referrals be sent to [facility], [facility], & [facility]. SW provided SNF planning guide & discuss medicare.gov's website to look at facility ratings. SW also discussed transportation options at discharge & the estimated out of pocket cost if a wheelchair ride is needed. Patient voiced she would like to think about transportation options & make a decision closer to discharge. GALEN will continue to follow to assist prn with discharge planning.     SULEMA Bhatia

## 2023-02-01 NOTE — PLAN OF CARE
To Do:  End of Shift Summary  For vital signs and complete assessments, please see documentation flowsheets.     Pertinent assessments: POD 1. On [place], on RA, >96%. VSS. AO. C/o pain, take scheduled Tylenol 650mg. BS hypo. R hip dressing, CDI. Weight-bearing on R hip and foot. Ax1 with gait belt. Up to bathroom x 3. Pt slept on/off.   Major Shift Events: MD paged to clarify BG check order. BG checks now QID.  Treatment Plan: Monitor pain, BG, O2, CO2. BG q1hr discontinued. Encourage ambulation as tolerated. Discharge TBD.     Bedside Nurse: Yasemin Castro RN

## 2023-02-01 NOTE — PROVIDER NOTIFICATION
MD paged via Web-based paging regarding discontinuing BG q 1 hr. BGs trending in 200s.     MD response via telephone: discontinued BG q 1hr. Continue BG QID and at bedtime.

## 2023-02-02 ENCOUNTER — APPOINTMENT (OUTPATIENT)
Dept: OCCUPATIONAL THERAPY | Facility: CLINIC | Age: 73
DRG: 480 | End: 2023-02-02
Attending: STUDENT IN AN ORGANIZED HEALTH CARE EDUCATION/TRAINING PROGRAM
Payer: COMMERCIAL

## 2023-02-02 ENCOUNTER — APPOINTMENT (OUTPATIENT)
Dept: PHYSICAL THERAPY | Facility: CLINIC | Age: 73
DRG: 480 | End: 2023-02-02
Payer: COMMERCIAL

## 2023-02-02 LAB
GLUCOSE BLDC GLUCOMTR-MCNC: 157 MG/DL (ref 70–99)
GLUCOSE BLDC GLUCOMTR-MCNC: 196 MG/DL (ref 70–99)
GLUCOSE BLDC GLUCOMTR-MCNC: 216 MG/DL (ref 70–99)
GLUCOSE BLDC GLUCOMTR-MCNC: 301 MG/DL (ref 70–99)
GLUCOSE SERPL-MCNC: 177 MG/DL (ref 70–99)
HGB BLD-MCNC: 10.8 G/DL (ref 11.7–15.7)

## 2023-02-02 PROCEDURE — 82947 ASSAY GLUCOSE BLOOD QUANT: CPT | Performed by: ORTHOPAEDIC SURGERY

## 2023-02-02 PROCEDURE — 99233 SBSQ HOSP IP/OBS HIGH 50: CPT | Performed by: INTERNAL MEDICINE

## 2023-02-02 PROCEDURE — 250N000013 HC RX MED GY IP 250 OP 250 PS 637: Performed by: STUDENT IN AN ORGANIZED HEALTH CARE EDUCATION/TRAINING PROGRAM

## 2023-02-02 PROCEDURE — 97535 SELF CARE MNGMENT TRAINING: CPT | Mod: GO | Performed by: OCCUPATIONAL THERAPIST

## 2023-02-02 PROCEDURE — 85018 HEMOGLOBIN: CPT | Performed by: STUDENT IN AN ORGANIZED HEALTH CARE EDUCATION/TRAINING PROGRAM

## 2023-02-02 PROCEDURE — 97530 THERAPEUTIC ACTIVITIES: CPT | Mod: GP

## 2023-02-02 PROCEDURE — 97116 GAIT TRAINING THERAPY: CPT | Mod: GP

## 2023-02-02 PROCEDURE — 36415 COLL VENOUS BLD VENIPUNCTURE: CPT | Performed by: STUDENT IN AN ORGANIZED HEALTH CARE EDUCATION/TRAINING PROGRAM

## 2023-02-02 PROCEDURE — 120N000001 HC R&B MED SURG/OB

## 2023-02-02 PROCEDURE — 97165 OT EVAL LOW COMPLEX 30 MIN: CPT | Mod: GO | Performed by: OCCUPATIONAL THERAPIST

## 2023-02-02 RX ORDER — ACETAMINOPHEN 325 MG/1
650 TABLET ORAL EVERY 6 HOURS PRN
Qty: 100 TABLET | Refills: 0 | DISCHARGE
Start: 2023-02-02 | End: 2023-03-27

## 2023-02-02 RX ORDER — ASPIRIN 325 MG
325 TABLET, DELAYED RELEASE (ENTERIC COATED) ORAL DAILY
Qty: 42 TABLET | Refills: 0 | DISCHARGE
Start: 2023-02-02 | End: 2023-03-16

## 2023-02-02 RX ORDER — OXYCODONE HYDROCHLORIDE 5 MG/1
5 TABLET ORAL EVERY 4 HOURS PRN
Qty: 25 TABLET | Refills: 0 | Status: SHIPPED | OUTPATIENT
Start: 2023-02-02 | End: 2023-02-14

## 2023-02-02 RX ORDER — AMOXICILLIN 250 MG
1 CAPSULE ORAL 2 TIMES DAILY PRN
Qty: 14 TABLET | Refills: 0 | DISCHARGE
Start: 2023-02-02 | End: 2023-03-27

## 2023-02-02 RX ORDER — METHOCARBAMOL 500 MG/1
500 TABLET, FILM COATED ORAL 3 TIMES DAILY PRN
Status: DISCONTINUED | OUTPATIENT
Start: 2023-02-02 | End: 2023-02-03 | Stop reason: HOSPADM

## 2023-02-02 RX ORDER — POLYETHYLENE GLYCOL 3350 17 G/17G
17 POWDER, FOR SOLUTION ORAL DAILY
Qty: 510 G | Refills: 0 | DISCHARGE
Start: 2023-02-02 | End: 2023-03-27

## 2023-02-02 RX ORDER — ONDANSETRON 4 MG/1
4 TABLET, ORALLY DISINTEGRATING ORAL EVERY 6 HOURS PRN
Qty: 5 TABLET | Refills: 0 | DISCHARGE
Start: 2023-02-02 | End: 2023-03-27

## 2023-02-02 RX ADMIN — ACETAMINOPHEN 975 MG: 325 TABLET, FILM COATED ORAL at 07:56

## 2023-02-02 RX ADMIN — INSULIN ASPART 3 UNITS: 100 INJECTION, SOLUTION INTRAVENOUS; SUBCUTANEOUS at 18:00

## 2023-02-02 RX ADMIN — INSULIN ASPART 1 UNITS: 100 INJECTION, SOLUTION INTRAVENOUS; SUBCUTANEOUS at 07:54

## 2023-02-02 RX ADMIN — ACETAMINOPHEN 975 MG: 325 TABLET, FILM COATED ORAL at 23:34

## 2023-02-02 RX ADMIN — INSULIN ASPART 4 UNITS: 100 INJECTION, SOLUTION INTRAVENOUS; SUBCUTANEOUS at 13:58

## 2023-02-02 RX ADMIN — ACETAMINOPHEN 975 MG: 325 TABLET, FILM COATED ORAL at 16:42

## 2023-02-02 RX ADMIN — ASPIRIN 325 MG: 325 TABLET, DELAYED RELEASE ORAL at 09:50

## 2023-02-02 ASSESSMENT — ACTIVITIES OF DAILY LIVING (ADL)
ADLS_ACUITY_SCORE: 24
ADLS_ACUITY_SCORE: 21
ADLS_ACUITY_SCORE: 21
ADLS_ACUITY_SCORE: 24
ADLS_ACUITY_SCORE: 24
ADLS_ACUITY_SCORE: 18
ADLS_ACUITY_SCORE: 24
IADL_COMMENTS: LIVES ALONE
PREVIOUS_RESPONSIBILITIES: MEAL PREP;HOUSEKEEPING;LAUNDRY;SHOPPING;MEDICATION MANAGEMENT;FINANCES;DRIVING
ADLS_ACUITY_SCORE: 21

## 2023-02-02 NOTE — PLAN OF CARE
To Do:  End of Shift Summary  For vital signs and complete assessments, please see documentation flowsheets.     Pertinent assessments: A&O x 4. RA. Assistant x 1 with [employer]. Scheduled tylenol given for right hip pain. Right hip dressing CDI. Ice applied. Neuro Intact. Voiding properly.     Major Shift Events: none    Treatment Plan: Pain and symptom management.      Bedside Nurse: Antoinette Wilson RN

## 2023-02-02 NOTE — PROGRESS NOTES
02/02/23 1125   Appointment Info   Signing Clinician's Name / Credentials (OT) Rudi Degroot OTR/L   Living Environment   People in Home alone   Current Living Arrangements house   Home Accessibility stairs within home   Number of Stairs, Within Home, Primary greater than 10 stairs   Stair Railings, Within Home, Primary railing on left side (ascending)   Transportation Anticipated family or friend will provide;agency   Living Environment Comments per chart: Patient lives alone in a condo.  No steps to enter but 14 steps up to bedroom.  Patient states she could stay on the main floor if needed, has half bath and could sponge bathe.  Patient sleeps in a standard bed, exits to left.  She has a tub-shower upstairs, one grab bar in bathroom but no seat bench.  Patient drives at baseline, has a neighbor that could transport her on Thursday. Full bath upstairs. 1/2 bath downstairs.   Self-Care   Usual Activity Tolerance good   Current Activity Tolerance moderate   Equipment Currently Used at Home none   Fall history within last six months yes   Number of times patient has fallen within last six months 2   Activity/Exercise/Self-Care Comment with dressing, bathing, and toileting.  Patient ambulates independently.  Patient reportedly quite sedentary but does get her own groceries.  She has a son living in [place]. PT completes step in tuib transfer using shower mellissa bar for support per report.   Instrumental Activities of Daily Living (IADL)   Previous Responsibilities meal prep;housekeeping;laundry;shopping;medication management;finances;driving   IADL Comments LIves alone   General Information   Onset of Illness/Injury or Date of Surgery 01/30/23   Referring Physician Ino Stephenson MD   Patient/Family Therapy Goal Statement (OT) return home   Additional Occupational Profile Info/Pertinent History of Current Problem Jennifer Jaimetariqamalia is a 72 year old female with history of uncontrolled DM2 who presented to the  ED for evaluation after a fall on 1/29 with concern for ongoing right hip and groin pain.      ED workup revealed:  glucose of 457, CBC unremarkable, VBG shows pH of 7.21, , bicarbonate of 14, qualitative ketones of 5.7, and EKG shows rate of 97 bpm. X-ray of right hip at urgent care questioned minimally displaced subcapital right femoral neck fracture.  CT of right hip in ED showed acute impacted fracture of the right femoral neck with mild valgus deformity. She was admitted to the hospital. She was treated for DKA with insulin drip with resolution. She was started on Lantus insulin and Novolog insulin by sliding scale. She was seen by orthopedic surgery and had screw fixation of left femoral neck fracture without complications   Existing Precautions/Restrictions fall;weight bearing   Right Lower Extremity (Weight-bearing Status) weight-bearing as tolerated (WBAT)   Cognitive Status Examination   Orientation Status orientation to person, place and time   Range of Motion Comprehensive   General Range of Motion bilateral upper extremity ROM WFL   Comment, General Range of Motion Decreased R LE ROM   Strength Comprehensive (MMT)   Comment, General Manual Muscle Testing (MMT) Assessment B UE WFL. Decreased R LE strength   Bed Mobility   Bed Mobility supine-sit;sit-supine   Supine-Sit Dougherty (Bed Mobility) moderate assist (50% patient effort)   Sit-Supine Dougherty (Bed Mobility) moderate assist (50% patient effort)   Transfers   Transfers sit-stand transfer;toilet transfer;shower transfer   Sit-Stand Transfer   Sit-Stand Dougherty (Transfers) minimum assist (75% patient effort)   Assistive Device (Sit-Stand Transfers) walker, front-wheeled   Shower Transfer   Type (Shower Transfer) stand pivot/stand step   Dougherty Level (Shower Transfer) maximum assist (25% patient effort)   Assistive Device (Shower Transfer) shower chair;walker, front-wheeled   Toilet Transfer   Type (Toilet Transfer)  sit-stand;stand-sit   [place] Level (Toilet Transfer) minimum assist (75% patient effort)   Assistive Device (Toilet Transfer) commode chair   Activities of Daily Living   BADL Assessment/Intervention lower body dressing   Lower Body Dressing Assessment/Training   Position (Lower Body Dressing) unsupported sitting   [place] Level (Lower Body Dressing) maximum assist (25% patient effort)   Clinical Impression   Criteria for Skilled Therapeutic Interventions Met (OT) Yes, treatment indicated   OT Diagnosis Weakness and decreased functional mobility for ADL   OT Problem List-Impairments impacting ADL problems related to;activity tolerance impaired;flexibility;mobility;range of motion (ROM);pain;post-surgical precautions;strength   Assessment of Occupational Performance 3-5 Performance Deficits   Identified Performance Deficits Dressing, G/H, toileting, bathing ,IADL   Planned Therapy Interventions (OT) ADL retraining;strengthening;transfer training;progressive activity/exercise   Clinical Decision Making Complexity (OT) low complexity   Anticipated Equipment Needs Upon Discharge (OT) shower chair;walker, rolling;commode chair  (Grab bars in tub/shower)   Risk & Benefits of therapy have been explained evaluation/treatment results reviewed;care plan/treatment goals reviewed;risks/benefits reviewed;current/potential barriers reviewed;participants voiced agreement with care plan;participants included;patient   OT Total Evaluation Time   OT Eval, Low Complexity Minutes (77492) 10   OT Goals   Therapy Frequency (OT) 5 times/wk   OT Predicted Duration/Target Date for Goal Attainment 02/09/23   OT Goals Hygiene/Grooming;Lower Body Dressing;Transfers;Toilet Transfer/Toileting;Cognition   OT: Hygiene/Grooming supervision/stand-by assist;within precautions;while standing   OT: Lower Body Dressing Supervision/stand-by assist;using adaptive equipment;within precautions   OT: Transfer Supervision/stand-by assist;with  assistive device;within precautions  (sit and turn t/f to tub chair)   OT: Toilet Transfer/Toileting Modified independent;cleaning and garment management;using adaptive equipment;within precautions   OT: Cognitive Patient/caregiver will verbalize understanding of cognitive assessment results/recommendations as needed for safe discharge planning   Interventions   Interventions Quick Adds Self-Care/Home Management;Therapeutic Activity   Self-Care/Home Management   Self-Care/Home Mgmt/ADL, Compensatory, Meal Prep Minutes (88361) 25   Symptoms Noted During/After Treatment (Meal Preparation/Planning Training) fatigue;increased pain   Treatment Detail/Skilled Intervention Eval completed and pt enaged in ADL dressing from EOB. Cued to attempt LE dress bending to L LE and completed with SBA. MAX asssist to reach R LE. OT introduced a/e with verbal explanation. Demo'd safe sit>stand to fww to progress ADL functional mobility. Ambulated with MIN A to bathroom. Introduced pt to over toilet commode and educated in transfer technique with B UE placement on commode armrests. Completed stand<>sit t/f with SBA after instruction. Edcuated pt in safe WW posiitoning at sink. Pt completed tooth brush ADL and hand wash ADL with supervision A after set up. Educated pt on tub chair and instructed in simmulated sit and turn transfer into tub. MIN A stand <>sit to tub bench with safety cues for correct techinuw. MOD A to swing R JERRY over simulated tub height. PT returned to EOB and cued to sit towards HOB with VC safety cue to reach back to bed for stand>sit. MOD A R LE management to return to supine and MIN A with VCs to scoot to HOB. PT left with bed alarm enaged amd call light and tray table in place.   OT Discharge Planning   OT Plan LE dressing with a/e. Tub transfer, toilet t/f   OT Discharge Recommendation (DC Rec) Transitional Care Facility   OT Rationale for DC Rec Pt lives alone and is significantly below baseline LOF. demonstrates  good potnetial to regain functional mobility vis TCU OT ADL rtraining and strengthening. Will bvenefit from [facility] ADL DME education as IP and at TCU setting   OT Brief overview of current status MIN A WW safety in bathroom. MOD A mobility of R LE   Total Session Time   Timed Code Treatment Minutes 25   Total Session Time (sum of timed and untimed services) 35

## 2023-02-02 NOTE — PROGRESS NOTES
Care Management Follow Up    Length of Stay (days): 3    Expected Discharge Date: 02/02/2023     Concerns to be Addressed:       Patient plan of care discussed at interdisciplinary rounds: Yes    Anticipated Discharge Disposition: Transitional Care, Skilled Nursing Facility     Anticipated Discharge Services:    Anticipated Discharge DME:      Patient/family educated on Medicare website which has current facility and service quality ratings:    Education Provided on the Discharge Plan:    Patient/Family in Agreement with the Plan:      Referrals Placed by CM/SW:  TCU  Private pay costs discussed: transportation costs, private room fees    Additional Information:  Received call from [facility] that they can accept patient for TCU bed tomorrow 2/3. Spoke with patient who accepts the bed and verified with her that she would prefer a private room and understands the additional daily cost of $48.   Reviewed out of pocket cost for [facility] transport, $81.80 for base rate and $5.26 per mile to the destination. Patient expressed understanding and are agreeable to this.     [facility] [place] up for 2/3 at 1115.  Will work on obtaining BCBS authorization.      Addendum 1440:  BCBS Authorization:  [id number] for dates 2/3/23-2/9/23    Mela Chou RN BSN OCN  Care Coordinator  [facility]  [phone]

## 2023-02-02 NOTE — PROGRESS NOTES
Orthopedic Surgery  Jenniferholger Mckennasultana  02/02/2023     Admit Date:  1/30/2023  POD: 2 Days Post-Op   Procedure(s):  In-situ percutaneous screw fixation, right valgus impacted femoral neck fracture    Patient seen in the hallway while she was working with PT.  Friend visiting.  Patient reports that she is doing well overall.  Describes some weakness, tightness, and soreness of the right thigh.     Pain controlled.  Tolerating oral intake.    No nausea or vomiting.  No chest pain or shortness of breath.  No acute events overnight.    Temp:  [97.3  F (36.3  C)-98.4  F (36.9  C)] 97.3  F (36.3  C)  Pulse:  [80-89] 84  Resp:  [16-20] 18  BP: ()/(55-69) 116/55  SpO2:  [97 %-99 %] 99 %    Alert and oriented.   Exam slightly limited as patient working with PT.  Right hip dressing is clean, dry, and intact.   No erythema of the surrounding skin.   Minimally swelling of right thigh.  Bilateral calves are soft, non-tender.  Right lower extremity is NVI.  Sensation intact.  Able to dorsiflex and plantar flex ankles, bilaterally.    Labs:  Recent Labs   Lab Test 02/02/23  0647 02/01/23  0624 01/31/23  0552 01/30/23  1940   WBC  --   --  8.1 10.2   HGB 10.8* 10.4* 12.2 13.4   PLT  --   --  194 207       1. PLAN:   Continue ASA 325mg daily for DVT prophylaxis.     Mobilize with PT/OT.   WBAT RLE with walker/gait aid.     Continue current pain regimen. PRN Robaxin added for complaints of right thigh muscle spasms/tightness.   Dressings: Keep intact.  Change if >60% saturated or peeling off.    Follow-up: 2 weeks post-op with Dr. Ino Stephenson    2. Disposition   Anticipate d/c to TCU when medically cleared and progressing in PT.    Mansi Dougherty PA-C

## 2023-02-02 NOTE — PROGRESS NOTES
[facility]    Medicine Progress Note - Hospitalist Service    Date of Admission:  1/30/2023    Assessment & Plan     Jennifer Presley is a 72 year old female with history of uncontrolled DM2 who presented to the ED for evaluation after a fall on 1/29 with concern for ongoing right hip and groin pain.      ED workup revealed:  glucose of 457, CBC unremarkable, VBG shows pH of 7.21, , bicarbonate of 14, qualitative ketones of 5.7, and EKG shows rate of 97 bpm. X-ray of right hip at urgent care questioned minimally displaced subcapital right femoral neck fracture.  CT of right hip in ED showed acute impacted fracture of the right femoral neck with mild valgus deformity. She was admitted to the hospital. She was treated for DKA with insulin drip with resolution. She was started on Lantus insulin and Novolog insulin by sliding scale. She was seen by orthopedic surgery and had screw fixation of left femoral neck fracture without complications. Therapies recommended TCU on discharge which was arranged for 2/3/23.    Problem list:     #DKA  #Uncontrolled DM2 with peripheral neuropathy  #Pseudohyponatremia  #Hyperphosphatemia:   -- She was treated overnight with IV insulin and blood glucose normalized.  -- She was on metformin many years ago but did not like side effects.  She has not been treated for diabetes for least 5 years..  Hemoglobin A1c was greater than 13  -She is agreeable to trying subcutaneous insulin; continue Lantus 15 units at HS and Novolog insulin by high resistanc sliding scale      #Right femoral neck fracture  #Sp mechanical fall  #S/p screw fixation of left femoral neck fracture without complications on 1/30/23  -Patient fell on 1/29 and attempting to bring in an 18 pound package at her doorstep where she lost her balance resulting in her falling backwards into her screen door.  -She had surgical repair on January 30  -PT and OT  -Pain control  -DVT prophylaxis with aspirin  as per orthopedics     #HLD  -Most recent lipid panel in 2019 showed relative 56, triglycerides of 206, HDL 44, and LDL of 171.  -Refusing to be engaged in any kind of medications for the same       Diet: Moderate Consistent Carb (60 g CHO per Meal) Diet    DVT Prophylaxis: ASA and PCDs  Rmoan Catheter: Not present  Lines: None     Cardiac Monitoring: None  Code Status: No CPR- Do NOT Intubate      Clinically Significant Risk Factors             # Anion Gap Metabolic Acidosis: Highest Anion Gap = 22 mmol/L in last 2 days, will monitor and treat as appropriate           # DMII: A1C = 13.2 % (Ref range: <5.7 %) within past 3 months, PRESENT ON ADMISSION          Disposition Plan      To TCU on 2/3/23      Corwin Vivas MD  Hospitalist Service  [facility]  Securely message with [employer] (more info)  Text page via [facility] Paging/Directory   ______________________________________________________________________    Interval History   No new problems. Still some pain in hip but improving. No chest pain or shortness of breath.     Physical Exam   Vital Signs: Temp: 97.3  F (36.3  C) Temp src: Oral BP: 116/55 Pulse: 84   Resp: 18 SpO2: 99 % O2 Device: None (Room air)    Weight: 136 lbs 0 oz    GENERAL:  Comfortable. Cooperative.  PSYCH: pleasant, oriented, No acute distress.  EYES: PERRLA, Normal conjunctiva.  HEART:  Regular rate and rhythm. No JVD. Pulses normal. No edema.  LUNGS:  Clear to auscultation, normal Respiratory effort.  ABDOMEN:  Soft, no hepatosplenomegaly, normal bowel sounds.  EXTREMETIES: No clubbing, cyanosis or ischemia  SKIN:  Dry to touch, No rash.      Medical Decision Making       40 MINUTES SPENT BY ME on the date of service doing chart review, history, exam, documentation & further activities per the note.      Data     I have personally reviewed the following data over the past 24 hrs:    N/A  \   10.8 (L)   / N/A     N/A N/A N/A /  157 (H)   N/A N/A N/A \       Imaging  results reviewed over the past 24 hrs:   No results found for this or any previous visit (from the past 24 hour(s)).  Recent Labs   Lab 02/02/23  0724 02/02/23  0647 02/01/23  2124 02/01/23  1155 02/01/23  0624 01/31/23  1356 01/31/23  1351 01/31/23  1115 01/31/23  0610 01/31/23  0552 01/30/23  2153 01/30/23  1940   WBC  --   --   --   --   --   --   --   --   --  8.1  --  10.2   HGB  --  10.8*  --   --  10.4*  --   --   --   --  12.2  --  13.4   MCV  --   --   --   --   --   --   --   --   --  97  --  101*   PLT  --   --   --   --   --   --   --   --   --  194  --  207   NA  --   --   --   --  135*  --  136 139  --  139   < > 132*   POTASSIUM  --   --   --   --  3.8  --  4.4 4.8  --  3.9   < > 4.5   CHLORIDE  --   --   --   --  102  --  101 103  --  104   < > 91*   CO2  --   --   --   --  18*  --  15* 14*  --  19*   < > 10*   BUN  --   --   --   --  20.2  --  27.1* 27.6*  --  26.5*   < > 24.4*   CR  --   --   --   --  0.44*  --  0.51 0.52  --  0.60   < > 0.62   ANIONGAP  --   --   --   --  15  --  20* 22*  --  16*   < > 31*   FRAN  --   --   --   --  8.7*  --  8.9 9.0  --  9.5   < > 9.7   * 177* 276*   < > 217*   < > 308* 306*   < > 147*   < > 457*   ALBUMIN  --   --   --   --   --   --   --   --   --   --   --  4.6   PROTTOTAL  --   --   --   --   --   --   --   --   --   --   --  7.9   BILITOTAL  --   --   --   --   --   --   --   --   --   --   --  0.7   ALKPHOS  --   --   --   --   --   --   --   --   --   --   --  76   ALT  --   --   --   --   --   --   --   --   --   --   --  11   AST  --   --   --   --   --   --   --   --   --   --   --  25   LIPASE  --   --   --   --   --   --   --   --   --   --   --  14    < > = values in this interval not displayed.

## 2023-02-02 NOTE — PLAN OF CARE
Goal Outcome Evaluation:       Pertinent assessments: A&O x 4. RA. Assistant x 1 with G&W. Scheduled tylenol given for right hip pain. Right hip dressing CDI. Ice applied to R hip operative site. CMS Intact except for baseline bilat LE neuropathy numbness. BM in AM, refused bowel meds (miralax and senna) and refused lisinopril stating she does not take at home and her BP is not high.  PCDs in place. Off floor for PT. Up to chair for lunch. IS initial instructions given and encouraged, well tolerated. /216.    Major Shift Events: none    Treatment Plan: Pain and symptom management.  PT/OT, plan to discharge to [facility] tomorrow via transport at 1115.     Bedside Nurse: ALEX REGAN RN

## 2023-02-02 NOTE — PLAN OF CARE
End of Shift Summary  For vital signs and complete assessments, please see documentation flowsheets.     Pertinent assessments: A/O. VSS. Scheduled tylenol given for right hip pain. Ice applied. Right hip dressing CDI.CMS intact except baseline BARON LE neuropathy. Neuro intact. Thalia Mod carb diet. Up with 1 assist with walker and belt. Up to chair for meals.     Major Shift Events: Uneventful.     Treatment Plan: Pain management. Blood glucose monitoring. Symptom management.

## 2023-02-03 VITALS
BODY MASS INDEX: 20.61 KG/M2 | HEART RATE: 88 BPM | RESPIRATION RATE: 14 BRPM | TEMPERATURE: 97.7 F | SYSTOLIC BLOOD PRESSURE: 124 MMHG | HEIGHT: 68 IN | WEIGHT: 136 LBS | OXYGEN SATURATION: 100 % | DIASTOLIC BLOOD PRESSURE: 80 MMHG

## 2023-02-03 LAB
GLUCOSE BLDC GLUCOMTR-MCNC: 137 MG/DL (ref 70–99)
GLUCOSE BLDC GLUCOMTR-MCNC: 177 MG/DL (ref 70–99)

## 2023-02-03 PROCEDURE — 99239 HOSP IP/OBS DSCHRG MGMT >30: CPT | Performed by: INTERNAL MEDICINE

## 2023-02-03 PROCEDURE — 250N000013 HC RX MED GY IP 250 OP 250 PS 637: Performed by: STUDENT IN AN ORGANIZED HEALTH CARE EDUCATION/TRAINING PROGRAM

## 2023-02-03 RX ORDER — LISINOPRIL 10 MG/1
10 TABLET ORAL DAILY
Start: 2023-02-03 | End: 2023-02-06

## 2023-02-03 RX ADMIN — ASPIRIN 325 MG: 325 TABLET, DELAYED RELEASE ORAL at 08:44

## 2023-02-03 RX ADMIN — ACETAMINOPHEN 650 MG: 325 TABLET, FILM COATED ORAL at 08:42

## 2023-02-03 ASSESSMENT — ACTIVITIES OF DAILY LIVING (ADL)
ADLS_ACUITY_SCORE: 21

## 2023-02-03 NOTE — PROGRESS NOTES
Pt cleared for discharge per Hospitalist and Ortho service. PIV removed. Any questions identified were answered. Pt belongings from security returned to pt on discharge (wallet, cash, etc). Lantus, lancets, and oxy Rx sent with pt and EMS. Transporting via WC.    Discharged from floor 1115.    Brandi Alejandra RN

## 2023-02-03 NOTE — DISCHARGE SUMMARY
[facility]  Hospitalist Discharge Summary      Date of Admission:  1/30/2023  Date of Discharge:  2/3/2023  Discharging Provider: Macie Medellin MD  Discharge Service: Hospitalist Service    Discharge Diagnoses   Mechanical fall with resultant right femoral neck fracture  Status post percutaneous screw fixation of the right femoral neck fracture  Type 2 diabetes mellitus with noncompliance and refusal for medications  DNR/DNI status    Follow-ups Needed After Discharge   Follow-up Appointments     Follow Up and recommended labs and tests      Please call as soon as possible to make an appointment to be seen in Dr. Ino Stephenson's clinic at 2 weeks postop for a right hip recheck,   x-rays, and wound care. This appointment can be done at your TCU by an   orthopedic provider if they have this capability with x-rays sent to Dr. Stephenson at [facility]. Will also have a follow up appointment at 8 weeks postop   for repeat x-rays as well.     Dr. Stephenson's care coordinator is Kerry Brown. Please contact her at   [phone] to schedule an appointment.       Dr. Stephenson sees patients at 2 clinic locations:  [facility]  [address] [place], [place] [id number] [place] [address] Orthopedics [facility]   [address] , [address], [place] [address]        Please call the on-call phone number [phone] during evenings, nights   and weekends for any urgent needs. Prescription refills must be done   during business hours by calling [phone].            Must follow-up with primary care provider after discharge from TCU for diabetes mellitus        Discharge Disposition   Discharged to short-term care facility  Condition at discharge: Stable      Hospital Course   72 year old female with PMH significant for uncontrolled DM2 who presented to the ED for evaluation after a fall on 1/29 with concern for ongoing right hip and groin pain.  She was noted to be in DKA on presentation  requiring initiation of IV insulin drip  .  She was also noted to have multiple electrolyte abnormalities which were addressed as appropriate.  Most importantly she was noted to have right femoral neck fracture and underwent surgical intervention  On January 30.  Noteworthy, patient is very resistant to medications including antihypertensive and hypoglycemic agents.  It required a lot of convincing coaxing and rationalization with the patient for her to initiate insulin.  Clearly her blood sugar needs to be managed and monitored on ongoing basis which is going to be a challenge.  Additionally this team had a discussion with patient in context of her CODE STATUS and she has opted to be DNR/DNI.  Given the fact that the patient lives by herself and has little or no social support it was determined that the best option for the patient would be for her to go to a TCU for ongoing cares.        Consultations This Hospital Stay   ORTHOPEDIC SURGERY IP CONSULT  CARE MANAGEMENT / SOCIAL WORK IP CONSULT  PHYSICAL THERAPY ADULT IP CONSULT  OCCUPATIONAL THERAPY ADULT IP CONSULT  SOCIAL WORK IP CONSULT  PHARMACY LIAISON FOR MEDICATION COVERAGE CONSULT  PHYSICAL THERAPY ADULT IP CONSULT  OCCUPATIONAL THERAPY ADULT IP CONSULT    Code Status   No CPR- Do NOT Intubate    Time Spent on this Encounter   I, Macie Medellin MD, personally saw the patient today and spent greater than 30 minutes discharging this patient.       Macie Medellin MD  [place] [address] [facility]  [address] [place] [address]  Phone: [phone]  Fax: [phone]  ______________________________________________________________________    Physical Exam   Vital Signs: Temp: 97.5  F (36.4  C) Temp src: Oral BP: 133/70 Pulse: 65   Resp: 16 SpO2: 98 % O2 Device: None (Room air)    Weight: 136 lbs 0 oz  General Appearance: Alert awake oriented x3 slender built  Respiratory: Clear to auscultation  Cardiovascular: S1S2  GI: Soft,  nontender nondistended bowel sounds are present    Skin: Surgical site appears to be intact  Other: No obvious neurological deficit       Primary Care Physician   Andrez Soares    Discharge Orders      General info for SNF    Length of Stay Estimate: Short Term Care: Estimated # of Days <30  Condition at Discharge: Stable  Level of care:skilled   Rehabilitation Potential: Fair  Admission H&P remains valid and up-to-date: Yes  Recent Chemotherapy: N/A  Use Nursing Home Standing Orders: Yes     Mantoux instructions    Give two-step Mantoux (PPD) Per Facility Policy Yes     Follow Up and recommended labs and tests    Please call as soon as possible to make an appointment to be seen in Dr. Ino Stephenson's clinic at 2 weeks postop for a right hip recheck, x-rays, and wound care. This appointment can be done at your TCU by an orthopedic provider if they have this capability with x-rays sent to Dr. Stephenson at [facility]. Will also have a follow up appointment at 8 weeks postop for repeat x-rays as well.     Dr. Stephenson's care coordinator is Kerry Brown. Please contact her at [phone] to schedule an appointment.       Dr. Stephenson sees patients at 2 clinic locations:  [facility]  [address] [place], [place] [id number] [place] [address] Orthopedics [facility]   [address], [address], [place] [address]        Please call the on-call phone number [phone] during evenings, nights and weekends for any urgent needs. Prescription refills must be done during business hours by calling [phone].     Reason for your hospital stay    S/p right femoral neck fracture in-situ pinning (DOS: 1/31/23)     Wound care    Please leave dressing intact for 2 weeks postoperatively. Okay to change if saturated >50% or peeling. Okay to shower. No soaking or submersion. Please contact your orthopedic surgical team if persistent drainage or redness develops around the surgical site.     Activity - Up with assistive  device     Activity - Up with nursing assistance     Weight bearing status    WBAT RLE with walker.     Physical Therapy Adult Consult    Evaluate and treat as clinically indicated.    Reason: S/p right femoral neck fracture in-situ pinning (DOS: 1/31/23)     Occupational Therapy Adult Consult    Evaluate and treat as clinically indicated.    Reason: S/p right femoral neck fracture in-situ pinning (DOS: 1/31/23)     Fall precautions     Crutches DME    DME Documentation: Describe the reason for need to support medical necessity: Impaired gait status post hip surgery. I, the undersigned, certify that the above prescribed supplies are medically necessary for this patient and is both reasonable and necessary in reference to accepted standards of medical practice in the treatment of this patient's condition and is not prescribed as a convenience.     Cane DME    DME Documentation: Describe the reason for need to support medical necessity: Impaired gait status post hip surgery. I, the undersigned, certify that the above prescribed supplies are medically necessary for this patient and is both reasonable and necessary in reference to accepted standards of medical practice in the treatment of this patient's condition and is not prescribed as a convenience.     Walker DME    : DME Documentation: Describe the reason for need to support medical necessity: Impaired gait status post hip surgery. I, the undersigned, certify that the above prescribed supplies are medically necessary for this patient and is both reasonable and necessary in reference to accepted standards of medical practice in the treatment of this patient's condition and is not prescribed as a convenience.       Significant Results and Procedures   Most Recent 3 CBC's:Recent Labs   Lab Test 02/02/23  0647 02/01/23  0624 01/31/23  0552 01/30/23  1940   WBC  --   --  8.1 10.2   HGB 10.8* 10.4* 12.2 13.4   MCV  --   --  97 101*   PLT  --   --  194 207     Most Recent  3 BMP's:Recent Labs   Lab Test 02/03/23  0819 02/03/23  0219 02/02/23 2057 02/01/23  1155 02/01/23  0624 01/31/23  1356 01/31/23  1351 01/31/23  1115   NA  --   --   --   --  135*  --  136 139   POTASSIUM  --   --   --   --  3.8  --  4.4 4.8   CHLORIDE  --   --   --   --  102  --  101 103   CO2  --   --   --   --  18*  --  15* 14*   BUN  --   --   --   --  20.2  --  27.1* 27.6*   CR  --   --   --   --  0.44*  --  0.51 0.52   ANIONGAP  --   --   --   --  15  --  20* 22*   FRAN  --   --   --   --  8.7*  --  8.9 9.0   * 177* 301*   < > 217*   < > 308* 306*    < > = values in this interval not displayed.     Most Recent 2 LFT's:Recent Labs   Lab Test 01/30/23 1940 04/29/19  0936   AST 25 11   ALT 11 16   ALKPHOS 76 71   BILITOTAL 0.7 0.8   ,   Results for orders placed or performed during the hospital encounter of 01/30/23   CT Hip Right w/o Contrast    Narrative    EXAM: CT HIP RIGHT W/O CONTRAST  LOCATION: [facility]  DATE/TIME: 1/30/2023 5:06 PM    INDICATION: right hip pain, XR fracture suspected  COMPARISON: 01/30/2023 radiographs.  TECHNIQUE: Noncontrast. Axial, sagittal and coronal thin-section reconstruction. Dose reduction techniques were used.     FINDINGS:     BONES/JOINTS:  -Acute impacted fracture of the right femoral neck with mild valgus deformity. Mild fragmentation at the cortical margin of the fracture in the femoral neck.  -No additional fractures are identified.  -Mild narrowing and hypertrophic change in the right hip joint.  -Mild narrowing and hypertrophic change in the left hip joint. Mild hypertrophic changes in the SI joints.    SOFT TISSUES:  -No subcutaneous hematoma or fluid collection. No intramuscular hematoma. No retracted tendon tear. No free fluid in the pelvis.      Impression    IMPRESSION:  1.  Acute impacted fracture of the right femoral neck with mild valgus deformity.  2.  Osteoarthritis in both hip and SI joints.     XR Surgery ALYSHA L/T 5 Min Fluoro  w Stills    Narrative    This exam was marked as non-reportable because it will not be read by a   radiologist or a [place] non-radiologist provider.         XR Pelvis w Hip Port Right 1 View    Narrative    XR PELVIS AND HIP PORTABLE RIGHT 1 VIEW 1/31/2023 9:28 AM     HISTORY: Status post Hip surgery    COMPARISON: Fluoroscopic study same day       Impression    IMPRESSION: Screw fixation right proximal femoral fracture with  hardware in place. Alignment is near-anatomic. Bones are  demineralized.    JACE COLIN MD         SYSTEM ID:  [id number]       Discharge Medications   Discharge Medication List as of 2/3/2023 10:59 AM      START taking these medications    Details   acetaminophen (TYLENOL) 325 MG tablet Take 2 tablets (650 mg) by mouth every 6 hours as needed for other (For optimal non-opioid multimodal pain management to improve pain control.), Disp-100 tablet, R-0, Transitional      aspirin (ASA) 325 MG EC tablet Take 1 tablet (325 mg) by mouth daily for 42 days, Disp-42 tablet, R-0, Transitional      insulin glargine (LANTUS PEN) 100 UNIT/ML pen Inject 15 Units Subcutaneous At Bedtime for 30 days, Disp-15 mL, R-0, E-PrescribeIf Lantus is not covered by insurance, may substitute Basaglar or Semglee or other insulin glargine product per insurance preference at same dose and frequency.        lisinopril (ZESTRIL) 10 MG tablet Take 1 tablet (10 mg) by mouth daily for 30 days, No Print Out      ondansetron (ZOFRAN ODT) 4 MG ODT tab Take 1 tablet (4 mg) by mouth every 6 hours as needed for nausea or vomiting, Disp-5 tablet, R-0, Transitional      oxyCODONE (ROXICODONE) 5 MG tablet Take 1 tablet (5 mg) by mouth every 4 hours as needed for severe pain (7-10), Disp-25 tablet, R-0, Local Print      polyethylene glycol (MIRALAX) 17 GM/Dose powder Take 17 g by mouth daily, Disp-510 g, R-0, Transitional      senna-docusate (SENOKOT-S/PERICOLACE) 8.6-50 MG tablet Take 1 tablet by mouth 2 times daily as needed for  constipation, Disp-14 tablet, R-0, Transitional           Allergies   No Known Allergies

## 2023-02-03 NOTE — PLAN OF CARE
Physical Therapy Discharge Summary    Reason for therapy discharge:    Discharged to transitional care facility.    Progress towards therapy goal(s). See goals on Care Plan in [facility] electronic health record for goal details.  Goals partially met.  Barriers to achieving goals:   discharge from facility.    Therapy recommendation(s):    Continued therapy is recommended.  Rationale/Recommendations:  TCU recommended for progression of strength and functional mobility.

## 2023-02-03 NOTE — PROGRESS NOTES
Care Management Discharge Note    Discharge Date: 02/03/2023     Discharge Disposition: Skilled Nursing Facility, Transitional Care    Discharge Transportation: agency    PAS Confirmation Code: [id number] ([id number])  Patient/family educated on Medicare website which has current facility and service quality ratings: yes    Additional Information:  CM following for discharge planning, discharging to [facility] TCU today at 1115 via WC transport. Orders completed and have been sent. Confirmed with bedside RN and TCU admissions. No further needs anticipated.     Jodie Vieira RN BSN   Inpatient Care Coordination  [facility]   Phone [phone]

## 2023-02-03 NOTE — PLAN OF CARE
For vital signs and complete assessments, please see documentation flowsheets.     Pertinent assessments: A&O x 4. VSS, RA. Assistant x 1 with walker. Ambulated around room. Scheduled tylenol given for right hip pain. Right hip dressing CDI. CMS Intact. Pt. States baseline bilat LE neuropathy numbness. PCDs in place.  IS encouraged, well tolerated.     Major Shift Events: none    Treatment Plan: Pain and symptom management. PT/OT, plan to discharge to [facility] tomorrow via transport at 1115.     Bedside Nurse: Estiven Joyce RN

## 2023-02-03 NOTE — PROGRESS NOTES
[facility]  INITIAL VISIT NOTE  February 6, 2023    PRIMARY CARE PROVIDER AND CLINIC:  Andrez Soares [address] [facility]  / [place] [address]    CHIEF COMPLAINT:  Hospital follow-up/Initial visit    HPI:    Jennifer Presley is a 72 year old  (1950) female [facility] initial [facility].     Medical history is notable for DM type II, dyslipidemia, IBS, and migraine.    Summary of hospital course:  Patient was hospitalized at [facility] from January 30 through February 3, 2023 for DKA (due to medical noncompliance) and right femoral neck fracture sustained after mechanical fall.  EKG showed normal sinus rhythm. Initial blood work was significant for glucose of 457, quantitative ketones of 5.7, and pH of 7.21 on VBG.  Hemoglobin A1c was 13.2.  CT hip revealed acute impacted fracture of the right femoral neck with mild valgus deformity. She was treated with insulin drip for DKA.  She was evaluated by orthopedic service and underwent in situ percutaneous screw fixation on January 31, 2023.  EBL was reportedly 50 mL.  Postop hemoglobin dropped to 10.4 on February 1 from initial 13.4.  Insulin regimen ultimately transitioned to Lantus and she was started on lisinopril.  She was initiated on aspirin for DVT prophylaxis.  TCU was recommended per therapies.  Hemoglobin at discharge was stable at 10.8.    Patient is admitted to this facility for medical management, nursing care, and rehab.     Of note, history was obtained from patient, facility RN, and extensive review of the chart.    Today's visit:  Patient was seen in her room, while sitting in a recliner.  She appears comfortable.  She rates her right hip pain at 2-3 out of 10.  She denies fever, chills, chest pain, palpitation, dyspnea, nausea, vomiting, abdominal pain, or urinary symptoms.  She denies history of hypertension and is requesting to discontinue lisinopril.   She had a bowel movement 2 days ago.  Her blood levels are in the range of 209-436.    CODE STATUS:   DNR / DNI    PAST MEDICAL HISTORY:   Fall resulting in right valgus impacted femoral neck fracture, s/p in situ percutaneous screw fixation on 2023  DM type II; diagnosed in   Dyslipidemia  IBS  Migraine    Note: No history of hypertension    Past Medical History:   Diagnosis Date     B-COMPLEX DEFIC NEC 2008     DIABETES MELLITUS TYPE II-UNCOMPL 3/5/2007    Diagnosed in        PAST SURGICAL HISTORY:   Past Surgical History:   Procedure Laterality Date     CLOSED REDUCTION, PERCUTANEOUS PINNING HIP Right 2023    Procedure: In-situ percutaneous screw fixation, right valgus impacted femoral neck fracture;  Surgeon: Ino Stephenson MD;  Location: [facility] OR     HC REMOVE TONSILS/ADENOIDS,<12 Y/O      T & A <13y.o.     ZZC NONSPECIFIC PROCEDURE      fourth degree laceration, postpartum       FAMILY HISTORY:   Family History   Problem Relation Age of Onset     C.A.D. Mother         62 with MI     Breast Cancer Mother         cause of death     Cancer Mother          of lung cancer     C.A.D. Brother         multiple stents.     Cancer Father         lung cancer,  age 56     Cancer Maternal Aunt         breast cancer     Cancer Paternal Aunt         breast cancer     Diabetes Paternal Grandmother         diabetes,  in late 30s       SOCIAL HISTORY:  Social History     Tobacco Use     Smoking status: Never     Smokeless tobacco: Never   Substance Use Topics     Alcohol use: Yes     Alcohol/week: 1.7 standard drinks       MEDICATIONS:  Current Outpatient Medications   Medication Sig Dispense Refill     acetaminophen (TYLENOL) 325 MG tablet Take 2 tablets (650 mg) by mouth every 6 hours as needed for other (For optimal non-opioid multimodal pain management to improve pain control.) 100 tablet 0     aspirin (ASA) 325 MG EC tablet Take 1 tablet (325 mg) by mouth daily for 42 days  "42 tablet 0     insulin glargine (LANTUS PEN) 100 UNIT/ML pen Inject 15 Units Subcutaneous At Bedtime for 30 days 15 mL 0     lisinopril (ZESTRIL) 10 MG tablet Take 1 tablet (10 mg) by mouth daily for 30 days       ondansetron (ZOFRAN ODT) 4 MG ODT tab Take 1 tablet (4 mg) by mouth every 6 hours as needed for nausea or vomiting 5 tablet 0     oxyCODONE (ROXICODONE) 5 MG tablet Take 1 tablet (5 mg) by mouth every 4 hours as needed for severe pain (7-10) 25 tablet 0     polyethylene glycol (MIRALAX) 17 GM/Dose powder Take 17 g by mouth daily 510 g 0     senna-docusate (SENOKOT-S/PERICOLACE) 8.6-50 MG tablet Take 1 tablet by mouth 2 times daily as needed for constipation 14 tablet 0       MED REC REQUIRED  Post Medication Reconciliation Status: discharge medications reconciled and changed, per note/orders      ALLERGIES:  No Known Allergies    ROS:  10 point ROS were negative other than the symptoms noted above in the HPI.    PHYSICAL EXAM:  Vital signs were reviewed in the chart.  Vital Signs:/75   Pulse 77   Temp 97.3  F (36.3  C)   Resp 18   Ht 1.727 m (5' 8\")   Wt 62 kg (136 lb 9.6 oz)   SpO2 98%   BMI 20.77 kg/m    General: Comfortable and in no acute distress  HEENT: Conjunctival pallor, no scleral icterus or injection, moist oral mucosa  Cardiovascular: Normal S1, S2, RRR  Respiratory: Lungs clear to auscultation bilaterally  GI: Abdomen soft, non-tender, non-distended, +BS  Extremities: No significant LE edema  Neuro: CX II-XII grossly intact; ROM in all four extremities grossly intact  Psych: Alert and oriented x3; normal affect  Skin: No acute rash    LABORATORY/IMAGING DATA:  All relevant labs and imaging data in [facility] and/or Care Everywhere were personally reviewed today.      Most Recent 3 CBC's:Recent Labs   Lab Test 02/02/23  0647 02/01/23  0624 01/31/23  0552 01/30/23  1940   WBC  --   --  8.1 10.2   HGB 10.8* 10.4* 12.2 13.4   MCV  --   --  97 101*   PLT  --   --  194 207     Most Recent 3 " BMP's:Recent Labs   Lab Test 02/03/23  0819 02/03/23  0219 02/02/23  2057 02/01/23  1155 02/01/23  0624 01/31/23  1356 01/31/23  1351 01/31/23  1115   NA  --   --   --   --  135*  --  136 139   POTASSIUM  --   --   --   --  3.8  --  4.4 4.8   CHLORIDE  --   --   --   --  102  --  101 103   CO2  --   --   --   --  18*  --  15* 14*   BUN  --   --   --   --  20.2  --  27.1* 27.6*   CR  --   --   --   --  0.44*  --  0.51 0.52   ANIONGAP  --   --   --   --  15  --  20* 22*   FRAN  --   --   --   --  8.7*  --  8.9 9.0   * 177* 301*   < > 217*   < > 308* 306*    < > = values in this interval not displayed.         ASSESSMENT/PLAN:  Mechanical fall, subsequent encounter,  Right valgus impacted femoral neck fracture, s/p in situ percutaneous screw fixation on January 31, 2023,  Physical deconditioning.  Patient is hemodynamically stable.  Surgical pain is fairly controlled.  Plan:  Fall precautions  Continue pain management with PRN acetaminophen 650 mg every 6 hours and oxycodone 5 mg every 4 hours  Continue DVT prophylaxis aspirin 325 mg daily through March 18, 2023  Mobilize with PT/OT  WBAT to RLE with walker  Continue PT/OT evaluation and therapy  Follow-up with Dr. Ino Stephenson in 2 weeks    Acute blood loss anemia.  Due to orthopedic surgery.  Postop hemoglobin dropped to 10.4 on February 1 from initial 13.4.  Hemoglobin at discharge was stable at 10.8.  Plan:  Recheck CBC on February 8  Monitor hemoglobin periodically    Poorly controlled DM type II (diagnosed in 2000),  DKA, resolved.  Previously on glimepiride and metformin.  Patient presented with DKA, due to noncompliance with medications for the past 6 to 7 years.  Hemoglobin A1c of 13.2% on January 30.  She was treated with insulin drip per protocol with subsequent transition to insulin glargine.  Blood glucose levels are now in the range of 209-436.  Plan:  Continue diabetic diet  Increase insulin glargine from 11 units subcu to 13 units subcu at  bedtime  Monitor blood glucose twice daily and further adjust insulin regimen as indicated  Follow-up with PCP after discharge from TCU     Hyponatremia.  Mild with the last sodium level of 135 on February 1.  Plan:  Recheck BMP on February 8  Monitor sodium level periodically    Dyslipidemia.  Previously on atorvastatin.  Noncompliant with medication.  Last LFT on January 31, 2023 with total cholesterol of 202, LDL of 130, HDL of 58, and triglyceride of 68.  Plan:  Follow-up with PCP    Slow transit constipation.  Plan:  Continue the bowel regimen    Note:  Patient was started on lisinopril in the hospital but she has no history of hypertension and she is requesting to have it discontinued.       Orders written by provider at facility:  Increase insulin glargine to 13 units subcu at bedtime, hold for blood glucose less than 120, DX: DM type II  Change blood glucose monitoring to twice daily (in the morning before breakfast and at night); Dx: DM type II  BMP on February 8, DX: Hyponatremia  CBC on February 8, DX: Anemia  Please arrange follow-up with Dr. Ino Stephenson (orthopedics) in 2 weeks for right hip fracture  Discontinue lisinopril          Disclaimer: This note may contain text created using speech-recognition software and may contain unintended word substitutions.      Electronically signed by:  Petty Davis MD

## 2023-02-03 NOTE — PLAN OF CARE
Occupational Therapy Discharge Summary    Reason for therapy discharge:    Discharged to transitional care facility.    Progress towards therapy goal(s). See goals on Care Plan in [facility] electronic health record for goal details.  Goals not met.  Barriers to achieving goals:   discharge from facility.    Therapy recommendation(s):    Continued therapy is recommended.  Rationale/Recommendations:  OT eval and treat at TCU.      **Pt not seen by discharging therapist on this date, note written based on previous treating therapist's notes and recommendations

## 2023-02-03 NOTE — PLAN OF CARE
"Patient vital signs are at baseline: Yes  Patient able to ambulate as they were prior to admission or with assist devices provided by therapies during their stay:  Yes  Patient MUST void prior to discharge:  Yes  Patient able to tolerate oral intake:  yes  Pain has adequate pain control using Oral analgesics:  Yes  Does patient have an identified :  Yes  Has goal D/C date and time been discussed with patient:  No,  Reason:  TBD.    /66 (BP Location: Left arm)   Pulse 71   Temp 97.7  F (36.5  C) (Oral)   Resp 18   Ht 1.727 m (5' 8\")   Wt 61.7 kg (136 lb)   SpO2 98%   BMI 20.68 kg/m     "

## 2023-02-05 ENCOUNTER — LAB REQUISITION (OUTPATIENT)
Dept: LAB | Facility: CLINIC | Age: 73
End: 2023-02-05
Payer: COMMERCIAL

## 2023-02-05 VITALS
SYSTOLIC BLOOD PRESSURE: 119 MMHG | TEMPERATURE: 97.3 F | DIASTOLIC BLOOD PRESSURE: 75 MMHG | HEART RATE: 77 BPM | WEIGHT: 136.6 LBS | RESPIRATION RATE: 18 BRPM | OXYGEN SATURATION: 98 % | BODY MASS INDEX: 20.7 KG/M2 | HEIGHT: 68 IN

## 2023-02-05 DIAGNOSIS — U07.1 COVID-19: ICD-10-CM

## 2023-02-05 DIAGNOSIS — Z11.1 ENCOUNTER FOR SCREENING FOR RESPIRATORY TUBERCULOSIS: ICD-10-CM

## 2023-02-05 PROCEDURE — U0005 INFEC AGEN DETEC AMPLI PROBE: HCPCS | Performed by: NURSE PRACTITIONER

## 2023-02-06 ENCOUNTER — PATIENT OUTREACH (OUTPATIENT)
Dept: CARE COORDINATION | Facility: CLINIC | Age: 73
End: 2023-02-06

## 2023-02-06 ENCOUNTER — LAB REQUISITION (OUTPATIENT)
Dept: LAB | Facility: CLINIC | Age: 73
End: 2023-02-06
Payer: COMMERCIAL

## 2023-02-06 ENCOUNTER — TRANSITIONAL CARE UNIT VISIT (OUTPATIENT)
Dept: GERIATRICS | Facility: CLINIC | Age: 73
End: 2023-02-06
Payer: COMMERCIAL

## 2023-02-06 DIAGNOSIS — E78.5 DYSLIPIDEMIA: ICD-10-CM

## 2023-02-06 DIAGNOSIS — E87.1 HYPONATREMIA: ICD-10-CM
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
## 2023-02-06 DIAGNOSIS — S72.001D CLOSED FRACTURE OF NECK OF RIGHT FEMUR WITH ROUTINE HEALING, SUBSEQUENT ENCOUNTER: ICD-10-CM

## 2023-02-06 DIAGNOSIS — K59.01 SLOW TRANSIT CONSTIPATION: ICD-10-CM

## 2023-02-06 DIAGNOSIS — E11.65 POORLY CONTROLLED TYPE 2 DIABETES MELLITUS (H): ICD-10-CM

## 2023-02-06 DIAGNOSIS — D62 ACUTE BLOOD LOSS ANEMIA: ICD-10-CM

## 2023-02-06 DIAGNOSIS — U07.1 COVID-19: ICD-10-CM

## 2023-02-06 DIAGNOSIS — R53.81 PHYSICAL DECONDITIONING: ICD-10-CM

## 2023-02-06 LAB — SARS-COV-2 RNA RESP QL NAA+PROBE: NEGATIVE

## 2023-02-06 PROCEDURE — 86481 TB AG RESPONSE T-CELL SUSP: CPT | Performed by: NURSE PRACTITIONER

## 2023-02-06 PROCEDURE — 99306 1ST NF CARE HIGH MDM 50: CPT | Performed by: INTERNAL MEDICINE

## 2023-02-06 PROCEDURE — U0003 INFECTIOUS AGENT DETECTION BY NUCLEIC ACID (DNA OR RNA); SEVERE ACUTE RESPIRATORY SYNDROME CORONAVIRUS 2 (SARS-COV-2) (CORONAVIRUS DISEASE [COVID-19]), AMPLIFIED PROBE TECHNIQUE, MAKING USE OF HIGH THROUGHPUT TECHNOLOGIES AS DESCRIBED BY CMS-2020-01-R: HCPCS | Performed by: NURSE PRACTITIONER

## 2023-02-06 PROCEDURE — P9604 ONE-WAY ALLOW PRORATED TRIP: HCPCS | Performed by: NURSE PRACTITIONER

## 2023-02-06 NOTE — PATIENT INSTRUCTIONS
Orders for Jennifer Presley (1950), MR# [id number]:    1) Increase insulin glargine to 13 units subcu at bedtime, hold for blood glucose less than 120, DX: DM type II  2) Change blood glucose monitoring to twice daily (in the morning before breakfast and at night); Dx: DM type II  3) BMP on February 8, DX: Hyponatremia  4) CBC on February 8, DX: Anemia  5) Please arrange follow-up with Dr. Ino Stephenson (orthopedics) in 2 weeks for right hip fracture  6) Discontinue lisinopril      Petty Davis MD  [facility]

## 2023-02-06 NOTE — PROGRESS NOTES
Clinic Care Coordination Contact  Care Coordination Transition Communication         Clinical Data: Patient was hospitalized at [facility] from 1/30/23 to 2/3/2023 with diagnosis of Mechanical fall with resultant right femoral neck fracture.     Transition to Facility:              [facility]              Contact name and phone number/fax: [phone]    Plan: TCU Email sent. RN/SW Care Coordinator will await notification from facility staff informing RN/SW Care Coordinator of patient's discharge plans/needs. RN/SW Care Coordinator will review chart and outreach to facility staff every 4 weeks and as needed.     SULEMA Miller  Clinic Care Coordinator  [facility]  [phone]  [email or web address]

## 2023-02-07 ENCOUNTER — LAB REQUISITION (OUTPATIENT)
Dept: LAB | Facility: CLINIC | Age: 73
End: 2023-02-07
Payer: COMMERCIAL

## 2023-02-07 DIAGNOSIS — D64.9 ANEMIA, UNSPECIFIED: ICD-10-CM

## 2023-02-07 DIAGNOSIS — E87.1 HYPO-OSMOLALITY AND HYPONATREMIA: ICD-10-CM

## 2023-02-07 LAB
GAMMA INTERFERON BACKGROUND BLD IA-ACNC: 0.05 IU/ML
M TB IFN-G BLD-IMP: NEGATIVE
M TB IFN-G CD4+ BCKGRND COR BLD-ACNC: 2.55 IU/ML
MITOGEN IGNF BCKGRD COR BLD-ACNC: -0.01 IU/ML
MITOGEN IGNF BCKGRD COR BLD-ACNC: 0 IU/ML
QUANTIFERON MITOGEN: 2.6 IU/ML
QUANTIFERON NIL TUBE: 0.05 IU/ML
QUANTIFERON TB1 TUBE: 0.04 IU/ML
QUANTIFERON TB2 TUBE: 0.05
SARS-COV-2 RNA RESP QL NAA+PROBE: NEGATIVE

## 2023-02-08 LAB
ANION GAP SERPL CALCULATED.3IONS-SCNC: 9 MMOL/L (ref 7–15)
BUN SERPL-MCNC: 10.4 MG/DL (ref 8–23)
CALCIUM SERPL-MCNC: 8.7 MG/DL (ref 8.8–10.2)
CHLORIDE SERPL-SCNC: 100 MMOL/L (ref 98–107)
CREAT SERPL-MCNC: 0.43 MG/DL (ref 0.51–0.95)
DEPRECATED HCO3 PLAS-SCNC: 30 MMOL/L (ref 22–29)
ERYTHROCYTE [DISTWIDTH] IN BLOOD BY AUTOMATED COUNT: 11.9 % (ref 10–15)
GFR SERPL CREATININE-BSD FRML MDRD: >90 ML/MIN/1.73M2
GLUCOSE SERPL-MCNC: 216 MG/DL (ref 70–99)
HCT VFR BLD AUTO: 34 % (ref 35–47)
HGB BLD-MCNC: 11.2 G/DL (ref 11.7–15.7)
MCH RBC QN AUTO: 31.8 PG (ref 26.5–33)
MCHC RBC AUTO-ENTMCNC: 32.9 G/DL (ref 31.5–36.5)
MCV RBC AUTO: 97 FL (ref 78–100)
PLATELET # BLD AUTO: 224 10E3/UL (ref 150–450)
POTASSIUM SERPL-SCNC: 3.8 MMOL/L (ref 3.4–5.3)
RBC # BLD AUTO: 3.52 10E6/UL (ref 3.8–5.2)
SODIUM SERPL-SCNC: 139 MMOL/L (ref 136–145)
WBC # BLD AUTO: 4 10E3/UL (ref 4–11)

## 2023-02-08 PROCEDURE — 36415 COLL VENOUS BLD VENIPUNCTURE: CPT | Performed by: NURSE PRACTITIONER

## 2023-02-08 PROCEDURE — P9604 ONE-WAY ALLOW PRORATED TRIP: HCPCS | Performed by: NURSE PRACTITIONER

## 2023-02-08 PROCEDURE — 85027 COMPLETE CBC AUTOMATED: CPT | Performed by: NURSE PRACTITIONER

## 2023-02-08 PROCEDURE — 82310 ASSAY OF CALCIUM: CPT | Performed by: NURSE PRACTITIONER

## 2023-02-08 NOTE — PROGRESS NOTES
Updated by nursing that BS remain elevated [phone] recently    Orders  Jennifer Presley  1950  1) Increase lantus to 15 units subcutaneous at bedtime. Hold if BS<120. Diagnosis: type 2 DM  NAN Mcmahon CNP on 2/8/2023 at 1:40 PM

## 2023-02-09 ENCOUNTER — LAB REQUISITION (OUTPATIENT)
Dept: LAB | Facility: CLINIC | Age: 73
End: 2023-02-09
Payer: COMMERCIAL

## 2023-02-09 DIAGNOSIS — U07.1 COVID-19: ICD-10-CM

## 2023-02-09 PROCEDURE — U0005 INFEC AGEN DETEC AMPLI PROBE: HCPCS | Performed by: NURSE PRACTITIONER

## 2023-02-09 NOTE — PROGRESS NOTES
[facility]  February 10, 2023      CHIEF COMPLAINT:  Episodic visit/ hyperglycemia    HPI:    Jennifer Presley is a 72 year old  (1950), who is being seen today for an episodic care visit at [facility].     Medical history is notable for DM type II, dyslipidemia, IBS, and migraine.     Summary of hospital course:  Patient was hospitalized at [facility] from January 30 through February 3, 2023 for DKA (due to medical noncompliance) and right femoral neck fracture sustained after mechanical fall.  EKG showed normal sinus rhythm. Initial blood work was significant for glucose of 457, quantitative ketones of 5.7, and pH of 7.21 on VBG.  Hemoglobin A1c was 13.2.  CT hip revealed acute impacted fracture of the right femoral neck with mild valgus deformity. She was treated with insulin drip for DKA.  She was evaluated by orthopedic service and underwent in situ percutaneous screw fixation on January 31, 2023.  EBL was reportedly 50 mL.  Postop hemoglobin dropped to 10.4 on February 1 from initial 13.4.  Insulin regimen ultimately transitioned to Lantus. She was initiated on aspirin for DVT prophylaxis.  TCU was recommended per therapies.  Hemoglobin at discharge was stable at 10.8. She was then admitted to this facility for medical management, nursing care, and rehab.        Today's visit:  Patient was seen in her room, while lying in bed.  She appears weak but in no acute distress.  She tested positive for COVID-19 yesterday.  She is fully vaccinated and boosted.  She does endorse some chills, generalized weakness, and cough but no fever, sputum, chest pain, or dyspnea.  She had a bowel movement last night.  Her blood glucose levels are high around 400.      CODE STATUS:   DNR / DNI    PAST MEDICAL HISTORY:   Fall resulting in right valgus impacted femoral neck fracture, s/p in situ percutaneous screw fixation on January 31, 2023  DM type II; diagnosed  "in 2000   DKA in January 2023 due to medical noncompliance  Dyslipidemia  IBS  Migraine  COVID-19 infection in February 2023    Note: No history of hypertension      Past Surgical, Family, and Social History were reviewed and updated in [facility].    Current Outpatient Medications   Medication Sig Dispense Refill     acetaminophen (TYLENOL) 325 MG tablet Take 2 tablets (650 mg) by mouth every 6 hours as needed for other (For optimal non-opioid multimodal pain management to improve pain control.) 100 tablet 0     aspirin (ASA) 325 MG EC tablet Take 1 tablet (325 mg) by mouth daily for 42 days 42 tablet 0     insulin glargine (LANTUS PEN) 100 UNIT/ML pen Inject 15 Units Subcutaneous At Bedtime Hold for blood glucose less than 120       ondansetron (ZOFRAN ODT) 4 MG ODT tab Take 1 tablet (4 mg) by mouth every 6 hours as needed for nausea or vomiting 5 tablet 0     oxyCODONE (ROXICODONE) 5 MG tablet Take 1 tablet (5 mg) by mouth every 4 hours as needed for severe pain (7-10) 25 tablet 0     polyethylene glycol (MIRALAX) 17 GM/Dose powder Take 17 g by mouth daily 510 g 0     senna-docusate (SENOKOT-S/PERICOLACE) 8.6-50 MG tablet Take 1 tablet by mouth 2 times daily as needed for constipation 14 tablet 0       MED REC REQUIRED  Post Medication Reconciliation Status: discharge medications reconciled and changed, per note/orders      ALLERGIES: Patient has no known allergies.    REVIEW OF SYSTEMS:  10 point ROS were negative other than the symptoms noted above in the HPI.    PHYSICAL EXAM:  Vital signs were reviewed in the chart.  Vital Signs:  /72   Pulse 77   Temp 97.8  F (36.6  C)   Resp 18   Ht 1.727 m (5' 8\")   Wt 62 kg (136 lb 9.6 oz)   SpO2 96%   BMI 20.77 kg/m    General: Somewhat weak and frail appearing but comfortable and in no acute distress  HEENT: No conjunctival pallor, no scleral icterus or injection, moist oral mucosa  Cardiovascular: Normal S1, S2, RRR  Respiratory: Lungs clear to auscultation " bilaterally; no wheezing, rhonchi or crackles  GI: Abdomen soft, non-tender, non-distended, +BS  Extremities: No LE edema  Neuro: CX II-XII grossly intact; ROM in all four extremities grossly intact  Psych: Alert and oriented x3; normal affect  Skin: No acute rash    LABORATORY/IMAGING DATA:  All relevant labs and imaging data in [facility] and/or Care Everywhere were personally reviewed today.      Most Recent 3 CBC's:Recent Labs   Lab Test 02/08/23 0547 02/02/23 0647 02/01/23 0624 01/31/23  0552 01/30/23 1940   WBC 4.0  --   --  8.1 10.2   HGB 11.2* 10.8* 10.4* 12.2 13.4   MCV 97  --   --  97 101*     --   --  194 207     Most Recent 3 BMP's:Recent Labs   Lab Test 02/08/23  0547 02/03/23  0819 02/03/23  0219 02/01/23  1155 02/01/23 0624 01/31/23  1356 01/31/23  1351     --   --   --  135*  --  136   POTASSIUM 3.8  --   --   --  3.8  --  4.4   CHLORIDE 100  --   --   --  102  --  101   CO2 30*  --   --   --  18*  --  15*   BUN 10.4  --   --   --  20.2  --  27.1*   CR 0.43*  --   --   --  0.44*  --  0.51   ANIONGAP 9  --   --   --  15  --  20*   FRAN 8.7*  --   --   --  8.7*  --  8.9   * 137* 177*   < > 217*   < > 308*    < > = values in this interval not displayed.     Most Recent 2 LFT's:Recent Labs   Lab Test 01/30/23 1940 04/29/19  0936   AST 25 11   ALT 11 16   ALKPHOS 76 71   BILITOTAL 0.7 0.8       ASSESSMENT/PLAN:  COVID-19 infection.  Tested positive on February 9.  Patient has some mild cold symptoms but otherwise stable from a respiratory standpoint.  She is fully vaccinated and boosted against COVID-19.  She is currently saturating at 96% on room air.  Plan:  Closely monitor respiratory symptoms and O2 sats  Low threshold for initiation of COVID-specific treatments if patient's condition worsens  Continue isolation through February 20    Poorly controlled DM type II (diagnosed in 2000),  DKA, resolved.  Previously on glimepiride and metformin.  Patient presented with DKA, due to  noncompliance with medications for the past 6 to 7 years.  Hemoglobin A1c of 13.2% on January 30.  She was treated with insulin drip per protocol with subsequent transition to insulin glargine.  Blood glucose levels are now around 400.  Plan:  Continue diabetic diet  Change insulin glargine to 10 units subcu twice daily, hold for blood glucose less than 120  Continue to blood glucose twice daily and further adjust insulin regimen as indicated  Follow-up with PCP after discharge from TCU     Mechanical fall, subsequent encounter,  Right valgus impacted femoral neck fracture, s/p in situ percutaneous screw fixation on January 31, 2023,  Physical deconditioning.  Patient is hemodynamically stable.  Surgical pain is fairly controlled.  Plan:  Fall precautions  Continue pain management with PRN acetaminophen 650 mg every 6 hours and oxycodone 5 mg every 4 hours  Continue DVT prophylaxis aspirin 325 mg daily through March 18, 2023  Continue PT/OT evaluation and therapy  WBAT to RLE with walker  Continue PT/OT evaluation and therapy  Follow-up with Dr. Ino Stephenson as directed    Acute blood loss anemia.  Due to orthopedic surgery.  Postop hemoglobin dropped to 10.4 on February 1 from initial 13.4.  Last hemoglobin of 11.2 on February 8.  Plan:  Monitor hemoglobin periodically      Hyponatremia.  Resolved.  Plan:  Monitor sodium level periodically     Dyslipidemia.  Previously on atorvastatin.  Noncompliant with medication.  Last LFT on January 31, 2023 with total cholesterol of 202, LDL of 130, HDL of 58, and triglyceride of 68.  Plan:  Follow-up with PCP     Slow transit constipation.  Plan:  Continue the bowel regimen           Orders written by provider at facility:  Discontinue current insulin glargine order  Start insulin glargine 10 units subcu twice daily (10 units in a.m. before breakfast and 10 units subcu at bedtime), hold for blood glucose less than 120, DX: DM type II      Recommendation by provider at  facility:  Low threshold for initiation of COVID-specific treatments if patient's condition worsens        Disclaimer: This note may contain text created using speech-recognition software and may contain unintended word substitutions.      Electronically signed by  Petty Davis MD

## 2023-02-10 ENCOUNTER — TRANSITIONAL CARE UNIT VISIT (OUTPATIENT)
Dept: GERIATRICS | Facility: CLINIC | Age: 73
End: 2023-02-10
Payer: COMMERCIAL

## 2023-02-10 VITALS
TEMPERATURE: 97.8 F | WEIGHT: 136.6 LBS | SYSTOLIC BLOOD PRESSURE: 109 MMHG | RESPIRATION RATE: 18 BRPM | HEART RATE: 77 BPM | HEIGHT: 68 IN | OXYGEN SATURATION: 96 % | DIASTOLIC BLOOD PRESSURE: 72 MMHG | BODY MASS INDEX: 20.7 KG/M2

## 2023-02-10 DIAGNOSIS — E87.1 HYPONATREMIA: ICD-10-CM

## 2023-02-10 DIAGNOSIS — D62 ACUTE BLOOD LOSS ANEMIA: ICD-10-CM

## 2023-02-10 DIAGNOSIS — R53.81 PHYSICAL DECONDITIONING: ICD-10-CM

## 2023-02-10 DIAGNOSIS — E78.5 DYSLIPIDEMIA: ICD-10-CM
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
## 2023-02-10 DIAGNOSIS — K59.01 SLOW TRANSIT CONSTIPATION: ICD-10-CM

## 2023-02-10 DIAGNOSIS — S72.001D CLOSED FRACTURE OF NECK OF RIGHT FEMUR WITH ROUTINE HEALING, SUBSEQUENT ENCOUNTER: ICD-10-CM

## 2023-02-10 DIAGNOSIS — E11.65 POORLY CONTROLLED TYPE 2 DIABETES MELLITUS (H): ICD-10-CM

## 2023-02-10 DIAGNOSIS — U07.1 INFECTION DUE TO 2019 NOVEL CORONAVIRUS: ICD-10-CM

## 2023-02-10 LAB — SARS-COV-2 RNA RESP QL NAA+PROBE: POSITIVE

## 2023-02-10 PROCEDURE — 99309 SBSQ NF CARE MODERATE MDM 30: CPT | Mod: CS | Performed by: INTERNAL MEDICINE

## 2023-02-10 NOTE — PATIENT INSTRUCTIONS
Orders for Jennifer SAHA Fernandez (1950), MR# [id number]:    1) Discontinue current insulin glargine order  2) Start insulin glargine 10 units subcu twice daily (10 units in a.m. before breakfast and 10 units subcu at bedtime), hold for blood glucose less than 120, DX: DM type II      Petty Davis MD  [facility]

## 2023-02-13 ENCOUNTER — LAB REQUISITION (OUTPATIENT)
Dept: LAB | Facility: CLINIC | Age: 73
End: 2023-02-13
Payer: COMMERCIAL

## 2023-02-13 VITALS
WEIGHT: 134.2 LBS | TEMPERATURE: 97.5 F | DIASTOLIC BLOOD PRESSURE: 78 MMHG | SYSTOLIC BLOOD PRESSURE: 118 MMHG | RESPIRATION RATE: 16 BRPM | HEIGHT: 68 IN | BODY MASS INDEX: 20.34 KG/M2 | OXYGEN SATURATION: 100 % | HEART RATE: 75 BPM

## 2023-02-13 DIAGNOSIS — U07.1 COVID-19: ICD-10-CM

## 2023-02-13 PROBLEM — K21.9 GASTROESOPHAGEAL REFLUX DISEASE: Status: ACTIVE | Noted: 2023-02-13

## 2023-02-14 ENCOUNTER — TRANSITIONAL CARE UNIT VISIT (OUTPATIENT)
Dept: GERIATRICS | Facility: CLINIC | Age: 73
End: 2023-02-14
Payer: COMMERCIAL

## 2023-02-14 ENCOUNTER — LAB REQUISITION (OUTPATIENT)
Dept: LAB | Facility: CLINIC | Age: 73
End: 2023-02-14
Payer: COMMERCIAL

## 2023-02-14 DIAGNOSIS — E11.29 CONTROLLED TYPE 2 DIABETES MELLITUS WITH OTHER DIABETIC KIDNEY COMPLICATION, WITH LONG-TERM CURRENT USE OF INSULIN (H): Primary | ICD-10-CM

## 2023-02-14 DIAGNOSIS — K21.9 GASTROESOPHAGEAL REFLUX DISEASE WITHOUT ESOPHAGITIS: ICD-10-CM

## 2023-02-14 DIAGNOSIS — Z79.4 CONTROLLED TYPE 2 DIABETES MELLITUS WITH OTHER DIABETIC KIDNEY COMPLICATION, WITH LONG-TERM CURRENT USE OF INSULIN (H): Primary | ICD-10-CM

## 2023-02-14 DIAGNOSIS — U07.1 COVID-19: ICD-10-CM

## 2023-02-14 DIAGNOSIS — I10 HYPERTENSION GOAL BP (BLOOD PRESSURE) < 140/90: ICD-10-CM

## 2023-02-14 DIAGNOSIS — S72.001D CLOSED FRACTURE OF NECK OF RIGHT FEMUR WITH ROUTINE HEALING, SUBSEQUENT ENCOUNTER: ICD-10-CM

## 2023-02-14 PROCEDURE — 99309 SBSQ NF CARE MODERATE MDM 30: CPT | Performed by: FAMILY MEDICINE

## 2023-02-14 NOTE — LETTER
2/14/2023        RE: Jennifer Mckennahlstedt  [address] [place] [address]        [facility]    Facility:   [facility]  ([facility]) [[id number]]   Code Status: DNR/DNI      CHIEF COMPLAINT/REASON FOR VISIT:  Chief Complaint   Patient presents with     RECHECK       HISTORY:      HPI: Jennifer is a 72 year old female who was hospitalized January 30, 2023 through February 3, 2023 secondary to mechanical fall resulting in right femoral neck fracture status post percutaneous screw fixation of the right femoral neck fracture along with today's discussion of diabetes, pain management and the rehabilitation process.  Regarding therapy she does feel like she is making pretty good progress and has been independent using the walker.  At home when she finally does get discharged her goal is to be able to use a walker just for safety purposes.  Her systolic blood pressures over the past week ranging  her weight 134 pounds as of February 11 she has been afebrile and heart rate between 70 and 80 and has been on room air.  Morning time blood sugars ranging 220-370 towards the p.m. the range 370-478 currently on glargine 10 units twice daily will now increase it to 14 units twice daily.  He is also aware of other modalities of treatment including sliding scale but she just wants to be on the basal.  For pain she is not having any discomfort her last Tylenol as needed was on February 6 and also going to discontinue the oxycodone due to nonuse.  Otherwise her bowels are regular her appetite is good sleeping well at night.  She does have a visit with the mobile orthopedic clinic today.  She also has a wonderful new bouquet of flowers in her room was from a friend in [place].  She has been to [place] before she would like to go back in the near future.  Currently lives in [place].    Past Medical History:   Diagnosis Date     B-COMPLEX DEFIC NEC 2/28/2008     DIABETES MELLITUS TYPE II-UNCOMPL  3/5/2007    Diagnosed in             Family History   Problem Relation Age of Onset     C.A.D. Mother         62 with MI     Breast Cancer Mother         cause of death     Cancer Mother          of lung cancer     C.A.D. Brother         multiple stents.     Cancer Father         lung cancer,  age 56     Cancer Maternal Aunt         breast cancer     Cancer Paternal Aunt         breast cancer     Diabetes Paternal Grandmother         diabetes,  in late 30s      Social History     Socioeconomic History     Marital status: Single   Occupational History     Occupation:      Employer: [employer]   Tobacco Use     Smoking status: Never     Smokeless tobacco: Never   Vaping Use     Vaping Use: Never used   Substance and Sexual Activity     Alcohol use: Yes     Alcohol/week: 1.7 standard drinks     Drug use: No     Comment: no THC for 30 years     Sexual activity: Never     Partners: Male        REVIEW OF SYSTEM:  She currently denies any new symptoms of fever cough or cold sore throat postnasal drip allergies shortness of breath dyspnea wheezing chest pain dizziness vertigo nausea vomiting diarrhea dysuria frequency urgency myalgias arthralgias or headache.    PHYSICAL EXAM:   Pleasant female in no acute distress.  Head is normocephalic.  Conjunctiva is pink and sclera is clear.  Lung sounds are clear throughout.  Cardiovascular S1-S2 regular rate and rhythm and no lower extremity edema.  Gastrointestinal soft and nontender.  Musculoskeletal right hip with a dressing in place good CMS to her right leg.  Psychiatric: Pleasant affect    Current Outpatient Medications:      acetaminophen (TYLENOL) 325 MG tablet, Take 2 tablets (650 mg) by mouth every 6 hours as needed for other (For optimal non-opioid multimodal pain management to improve pain control.), Disp: 100 tablet, Rfl: 0     aspirin (ASA) 325 MG EC tablet, Take 1 tablet (325 mg) by mouth daily for 42 days, Disp: 42 tablet,  "Rfl: 0     insulin glargine (LANTUS PEN) 100 UNIT/ML pen, Inject 14 Units Subcutaneous 2 times daily Hold for blood glucose less than 120, Disp: , Rfl:      ondansetron (ZOFRAN ODT) 4 MG ODT tab, Take 1 tablet (4 mg) by mouth every 6 hours as needed for nausea or vomiting, Disp: 5 tablet, Rfl: 0     polyethylene glycol (MIRALAX) 17 GM/Dose powder, Take 17 g by mouth daily, Disp: 510 g, Rfl: 0     senna-docusate (SENOKOT-S/PERICOLACE) 8.6-50 MG tablet, Take 1 tablet by mouth 2 times daily as needed for constipation, Disp: 14 tablet, Rfl: 0       /78   Pulse 75   Temp 97.5  F (36.4  C)   Resp 16   Ht 1.727 m (5' 8\")   Wt 60.9 kg (134 lb 3.2 oz)   SpO2 100%   BMI 20.41 kg/m      LABS:   CBC RESULTS: Recent Labs   Lab Test 02/08/23  0547   WBC 4.0   RBC 3.52*   HGB 11.2*   HCT 34.0*   MCV 97   MCH 31.8   MCHC 32.9   RDW 11.9        Last Comprehensive Metabolic Panel:  Sodium   Date Value Ref Range Status   02/08/2023 139 136 - 145 mmol/L Final   04/29/2019 136 133 - 144 mmol/L Final     Potassium   Date Value Ref Range Status   02/08/2023 3.8 3.4 - 5.3 mmol/L Final   04/29/2019 4.0 3.4 - 5.3 mmol/L Final     Chloride   Date Value Ref Range Status   02/08/2023 100 98 - 107 mmol/L Final   04/29/2019 103 94 - 109 mmol/L Final     Carbon Dioxide   Date Value Ref Range Status   04/29/2019 23 20 - 32 mmol/L Final     Carbon Dioxide (CO2)   Date Value Ref Range Status   02/08/2023 30 (H) 22 - 29 mmol/L Final     Anion Gap   Date Value Ref Range Status   02/08/2023 9 7 - 15 mmol/L Final   04/29/2019 10 3 - 14 mmol/L Final     Glucose   Date Value Ref Range Status   02/08/2023 216 (H) 70 - 99 mg/dL Final   04/29/2019 337 (H) 70 - 99 mg/dL Final     Comment:     Fasting specimen     GLUCOSE BY METER POCT   Date Value Ref Range Status   02/03/2023 137 (H) 70 - 99 mg/dL Final     Urea Nitrogen   Date Value Ref Range Status   02/08/2023 10.4 8.0 - 23.0 mg/dL Final   04/29/2019 13 7 - 30 mg/dL Final     Creatinine "   Date Value Ref Range Status   02/08/2023 0.43 (L) 0.51 - 0.95 mg/dL Final   04/29/2019 0.51 (L) 0.52 - 1.04 mg/dL Final     GFR Estimate   Date Value Ref Range Status   02/08/2023 >90 >60 mL/min/1.73m2 Final     Comment:     eGFR calculated using 2021 CKD-EPI equation.   04/29/2019 >90 >60 mL/min/[1.73_m2] Final     Comment:     Non  GFR Calc  Starting 12/18/2018, serum creatinine based estimated GFR (eGFR) will be   calculated using the Chronic Kidney Disease Epidemiology Collaboration   (CKD-EPI) equation.       Calcium   Date Value Ref Range Status   02/08/2023 8.7 (L) 8.8 - 10.2 mg/dL Final   04/29/2019 9.5 8.5 - 10.1 mg/dL Final           ASSESSMENT:    Encounter Diagnoses   Name Primary?     Controlled type 2 diabetes mellitus with other diabetic kidney complication, with long-term current use of insulin (H) Yes     Hypertension goal BP (blood pressure) < 140/90      Closed fracture of neck of right femur with routine healing, subsequent encounter      Gastroesophageal reflux disease without esophagitis        PLAN:    For the diabetes at this time she just wants to change the glargine will increase to 14 units twice daily she is also aware that we could do some sliding scale to lower those numbers.  The meantime no pain no Tylenol as needed and will discontinue the oxycodone as needed due to nonuse.  She wants to be able to soon have a care conference she is feeling like she is made enough progress to be able to be discharged to her home so she would try to connect with the staff members to find out what she needs to do for discharge.  Does have the mobile orthopedic visit coming in today for her right hip.        Electronically signed by: Rashid Stephenson NP          Sincerely,        Rashid Stephenson NP

## 2023-02-14 NOTE — PROGRESS NOTES
[facility] GERIATRIC SERVICES    Facility:   [facility]  ([facility]) [[id number]]   Code Status: DNR/DNI      CHIEF COMPLAINT/REASON FOR VISIT:  Chief Complaint   Patient presents with     RECHECK       HISTORY:      HPI: Jennifer is a 72 year old female who was hospitalized January 30, 2023 through February 3, 2023 secondary to mechanical fall resulting in right femoral neck fracture status post percutaneous screw fixation of the right femoral neck fracture along with today's discussion of diabetes, pain management and the rehabilitation process.  Regarding therapy she does feel like she is making pretty good progress and has been independent using the walker.  At home when she finally does get discharged her goal is to be able to use a walker just for safety purposes.  Her systolic blood pressures over the past week ranging  her weight 134 pounds as of February 11 she has been afebrile and heart rate between 70 and 80 and has been on room air.  Morning time blood sugars ranging 220-370 towards the p.m. the range 370-478 currently on glargine 10 units twice daily will now increase it to 14 units twice daily.  He is also aware of other modalities of treatment including sliding scale but she just wants to be on the basal.  For pain she is not having any discomfort her last Tylenol as needed was on February 6 and also going to discontinue the oxycodone due to nonuse.  Otherwise her bowels are regular her appetite is good sleeping well at night.  She does have a visit with the mobile orthopedic clinic today.  She also has a wonderful new bouquet of flowers in her room was from a friend in [place].  She has been to [place] before she would like to go back in the near future.  Currently lives in [place].    Past Medical History:   Diagnosis Date     B-COMPLEX DEFIC NEC 2/28/2008     DIABETES MELLITUS TYPE II-UNCOMPL 3/5/2007    Diagnosed in 1999            Family History   Problem Relation Age of Onset     C.A.D.  Mother         62 with MI     Breast Cancer Mother         cause of death     Cancer Mother          of lung cancer     C.A.D. Brother         multiple stents.     Cancer Father         lung cancer,  age 56     Cancer Maternal Aunt         breast cancer     Cancer Paternal Aunt         breast cancer     Diabetes Paternal Grandmother         diabetes,  in late 30s      Social History     Socioeconomic History     Marital status: Single   Occupational History     Occupation:      Employer: [employer]   Tobacco Use     Smoking status: Never     Smokeless tobacco: Never   Vaping Use     Vaping Use: Never used   Substance and Sexual Activity     Alcohol use: Yes     Alcohol/week: 1.7 standard drinks     Drug use: No     Comment: no THC for 30 years     Sexual activity: Never     Partners: Male        REVIEW OF SYSTEM:  She currently denies any new symptoms of fever cough or cold sore throat postnasal drip allergies shortness of breath dyspnea wheezing chest pain dizziness vertigo nausea vomiting diarrhea dysuria frequency urgency myalgias arthralgias or headache.    PHYSICAL EXAM:   Pleasant female in no acute distress.  Head is normocephalic.  Conjunctiva is pink and sclera is clear.  Lung sounds are clear throughout.  Cardiovascular S1-S2 regular rate and rhythm and no lower extremity edema.  Gastrointestinal soft and nontender.  Musculoskeletal right hip with a dressing in place good CMS to her right leg.  Psychiatric: Pleasant affect    Current Outpatient Medications:      acetaminophen (TYLENOL) 325 MG tablet, Take 2 tablets (650 mg) by mouth every 6 hours as needed for other (For optimal non-opioid multimodal pain management to improve pain control.), Disp: 100 tablet, Rfl: 0     aspirin (ASA) 325 MG EC tablet, Take 1 tablet (325 mg) by mouth daily for 42 days, Disp: 42 tablet, Rfl: 0     insulin glargine (LANTUS PEN) 100 UNIT/ML pen, Inject 14 Units Subcutaneous 2 times daily  "Hold for blood glucose less than 120, Disp: , Rfl:      ondansetron (ZOFRAN ODT) 4 MG ODT tab, Take 1 tablet (4 mg) by mouth every 6 hours as needed for nausea or vomiting, Disp: 5 tablet, Rfl: 0     polyethylene glycol (MIRALAX) 17 GM/Dose powder, Take 17 g by mouth daily, Disp: 510 g, Rfl: 0     senna-docusate (SENOKOT-S/PERICOLACE) 8.6-50 MG tablet, Take 1 tablet by mouth 2 times daily as needed for constipation, Disp: 14 tablet, Rfl: 0       /78   Pulse 75   Temp 97.5  F (36.4  C)   Resp 16   Ht 1.727 m (5' 8\")   Wt 60.9 kg (134 lb 3.2 oz)   SpO2 100%   BMI 20.41 kg/m      LABS:   CBC RESULTS: Recent Labs   Lab Test 02/08/23  0547   WBC 4.0   RBC 3.52*   HGB 11.2*   HCT 34.0*   MCV 97   MCH 31.8   MCHC 32.9   RDW 11.9        Last Comprehensive Metabolic Panel:  Sodium   Date Value Ref Range Status   02/08/2023 139 136 - 145 mmol/L Final   04/29/2019 136 133 - 144 mmol/L Final     Potassium   Date Value Ref Range Status   02/08/2023 3.8 3.4 - 5.3 mmol/L Final   04/29/2019 4.0 3.4 - 5.3 mmol/L Final     Chloride   Date Value Ref Range Status   02/08/2023 100 98 - 107 mmol/L Final   04/29/2019 103 94 - 109 mmol/L Final     Carbon Dioxide   Date Value Ref Range Status   04/29/2019 23 20 - 32 mmol/L Final     Carbon Dioxide (CO2)   Date Value Ref Range Status   02/08/2023 30 (H) 22 - 29 mmol/L Final     Anion Gap   Date Value Ref Range Status   02/08/2023 9 7 - 15 mmol/L Final   04/29/2019 10 3 - 14 mmol/L Final     Glucose   Date Value Ref Range Status   02/08/2023 216 (H) 70 - 99 mg/dL Final   04/29/2019 337 (H) 70 - 99 mg/dL Final     Comment:     Fasting specimen     GLUCOSE BY METER POCT   Date Value Ref Range Status   02/03/2023 137 (H) 70 - 99 mg/dL Final     Urea Nitrogen   Date Value Ref Range Status   02/08/2023 10.4 8.0 - 23.0 mg/dL Final   04/29/2019 13 7 - 30 mg/dL Final     Creatinine   Date Value Ref Range Status   02/08/2023 0.43 (L) 0.51 - 0.95 mg/dL Final   04/29/2019 0.51 (L) " 0.52 - 1.04 mg/dL Final     GFR Estimate   Date Value Ref Range Status   02/08/2023 >90 >60 mL/min/1.73m2 Final     Comment:     eGFR calculated using 2021 CKD-EPI equation.   04/29/2019 >90 >60 mL/min/[1.73_m2] Final     Comment:     Non  GFR Calc  Starting 12/18/2018, serum creatinine based estimated GFR (eGFR) will be   calculated using the Chronic Kidney Disease Epidemiology Collaboration   (CKD-EPI) equation.       Calcium   Date Value Ref Range Status   02/08/2023 8.7 (L) 8.8 - 10.2 mg/dL Final   04/29/2019 9.5 8.5 - 10.1 mg/dL Final           ASSESSMENT:    Encounter Diagnoses   Name Primary?     Controlled type 2 diabetes mellitus with other diabetic kidney complication, with long-term current use of insulin (H) Yes     Hypertension goal BP (blood pressure) < 140/90      Closed fracture of neck of right femur with routine healing, subsequent encounter      Gastroesophageal reflux disease without esophagitis        PLAN:    For the diabetes at this time she just wants to change the glargine will increase to 14 units twice daily she is also aware that we could do some sliding scale to lower those numbers.  The meantime no pain no Tylenol as needed and will discontinue the oxycodone as needed due to nonuse.  She wants to be able to soon have a care conference she is feeling like she is made enough progress to be able to be discharged to her home so she would try to connect with the staff members to find out what she needs to do for discharge.  Does have the mobile orthopedic visit coming in today for her right hip.    She will now be discharging to home on December 17, 2023 with current medications as well as receive physical and occupational therapy home health aide and nursing.  She will require the various services secondary to her chronic medical conditions and recent hospitalization but also continuation of the home rehabilitation process and home safety along with nursing for medication  management.  She will be homebound secondary to her chronic medical conditions as well as recent hospitalization and stay in the transitional care unit and continue with therapy along with home safety and nursing for medication management.  She will also follow-up with her primary care doctor regarding medication management and any future laboratory studies.  She will also follow-up with orthopedics as previously arranged.    Electronically signed by: Rashid Stephenson NP         Documentation of Face-to-Face and Certification for Home Health Services     Patient: Jennifer Presley   YOB: 1950  MR Number: [id number]  Today's Date: 2/17/2023    I certify that patient: Jennifer Presley is under my care and that I, or a nurse practitioner or physician's assistant working with me, had a face-to-face encounter that meets the physician face-to-face encounter requirements with this patient on: 2/10/2023.    This encounter with the patient was in whole, or in part, for the following medical condition, which is the primary reason for home health care: Left femoral neck fracture, poorly controlled diabetes, and physical deconditioning.    I certify that, based on my findings, the following services are medically necessary home health services: Nursing, Occupational Therapy, Physical Therapy and Home health aide.    My clinical findings support the need for the above services because: Nurse is needed: To assess Diabetes control after changes in medications or other medical regimen. and To provide assessment and oversight required in the home to assure adherence to the medical plan due to: Uncontrolled diabetes.., Occupational Therapy Services are needed to assess and treat cognitive ability and address ADL safety due to impairment in Judgment. and Physical Therapy Services are needed to assess and treat the following functional impairments: Physical deconditioning.    Further, I certify that my  clinical findings support that this patient is homebound (i.e. absences from home require considerable and taxing effort and are for medical reasons or Yazidism services or infrequently or of short duration when for other reasons) because: Requires assistance of another person or specialized equipment to access medical services because patient: Is unable to exit home safely on own due to: Recent hip surgery...    Based on the above findings. I certify that this patient is confined to the home and needs intermittent skilled nursing care, physical therapy and/or speech therapy.  The patient is under my care, and I have initiated the establishment of the plan of care.  This patient will be followed by a physician who will periodically review the plan of care.  Physician/Provider to provide follow up care: Andrez Soares    Responsible Medicare certified PECOS Physician: Petty Davis MD  Physician Signature: See electronic signature associated with these discharge orders.  Date: 2/17/2023

## 2023-02-20 ENCOUNTER — TELEPHONE (OUTPATIENT)
Dept: CARE COORDINATION | Facility: CLINIC | Age: 73
End: 2023-02-20
Payer: COMMERCIAL

## 2023-02-20 NOTE — TELEPHONE ENCOUNTER
Received call from [facility] ([facility]- [phone]) indicating they received referral from TCU for RN, PT, OT and HHA. Pt has no means of completing a virtual or in person  PCP visit this week. Requesting a message be sent tolisted PCP.    [facility] asking if PCP will follow pt for orders to see out this home care episode?     Please call back [facility] with determination.     Routing to Care Team for review.     SULEMA Miller  Clinic Care Coordinator  [facility] [place]  [phone]  [email or web address]

## 2023-02-21 NOTE — TELEPHONE ENCOUNTER
Called [facility] from [facility] to update PCP will follow for Home Care orders.     Boone Tavares, [facility]  Clinic Care Coordinator  [facility]  [phone]  [email or web address]

## 2023-02-21 NOTE — TELEPHONE ENCOUNTER
She'll need to come in within next 2-3 weeks in order to be eligible for a face to face so I can approve this.  Is this what you are asking?  If so,can we schedule a follow up appointment virtually or in person with me or Maris Boyer?

## 2023-02-21 NOTE — TELEPHONE ENCOUNTER
"Home care acknowledges it has been over a year since you last saw this pt and typically they would need to cancel the referral for Home Care if there is \"no current PCP\" (their definition being seen within a year). Knowing pt cannot do a face to face or virtual this week they are wondering if you are willing to follow for orders before you see her for follow up or if they should cancel the referral?     If she needs to come in within the next 2-3 weeks can MA/TC work to get pt scheduled asap?      "

## 2023-03-15 ENCOUNTER — TRANSFERRED RECORDS (OUTPATIENT)
Dept: HEALTH INFORMATION MANAGEMENT | Facility: CLINIC | Age: 73
End: 2023-03-15

## 2023-03-16 ENCOUNTER — PATIENT OUTREACH (OUTPATIENT)
Dept: CARE COORDINATION | Facility: CLINIC | Age: 73
End: 2023-03-16
Payer: COMMERCIAL

## 2023-03-16 ASSESSMENT — ACTIVITIES OF DAILY LIVING (ADL): DEPENDENT_IADLS:: INDEPENDENT

## 2023-03-16 NOTE — Clinical Note
"See note- it appears pt was prescribed insulin from rehab and pt has not complied. States she doesn't take medications. 2/14/23 discharge note from TCU talks about how BS were running. Pt declined Home Care when they called to schedule. I tried to talk to her about MTM possibly at next visit- she shared frustration with pharmacists in general given the insulin being filled without knowing if it was covered. She declined Enrollment with Care Coordination and ongoing outreaches. Says she's \"fine.\" Discharge date from TCU 2/17/2023. Would this pt be candidate for a change to SB3 to have an RN PAL?  Boone Tavares, [facility] Clinic Care Coordinator [facility] [phone] [email or web address]   "

## 2023-03-16 NOTE — PROGRESS NOTES
"Clinic Care Coordination Contact    Clinic Care Coordination Contact  OUTREACH    Referral Information:  Referral Source: IP Handoff         Chief Complaint   Patient presents with     Clinic Care Coordination - Initial     Follow up from Nursing Home Rehab        [place] Utilization:    Utilization    Hospital Admissions  1             ED Visits  1             No Show Count (past year)  0                Current as of: 3/13/2023  4:56 AM              Clinical Concerns:  Current Medical Concerns:    Patient Active Problem List   Diagnosis     Irritable bowel syndrome     Migraine     Other specified idiopathic peripheral neuropathy     B-complex deficiency     Skin tag     HYPERLIPIDEMIA LDL GOAL <100     Cystocele     Hypertension goal BP (blood pressure) < 140/90     Type 2 diabetes mellitus, controlled (H)     Tubular adenoma of rectum     Closed fracture of neck of right femur, initial encounter (H)     Gastroesophageal reflux disease       [facility] outreached to pt on this date to follow up since discharge from TCU and collaboration with Home Care.     Pt shares she was offered home care but declined their services. Pt states she feels she is doing fine- gets around her house, is making meals, independent in cares. Pt shares she was given the \"green light\" for driving from her surgeon yesterday 3/15/2023 at post surgery follow up.     [facility] explained care coordination and role/support. Pt did share she is very upset at the rehab facility for ordering a medication with is not covered under her insurance. Pt states she was prescribed insulin glargine (LANTUS PEN) 100 UNIT/ML pen without having been verified it was covered under her insurance. Additionally pt states she was not educated on the medication and did not know it needed to be refrigerated. Pt states it was without refrigeration for 2 days and Pt states she did not take any, but even so ultimately threw it away. Pt says she is still working to pay off the " "$150 she had to pay for the medication of which she didn't even take. Attempted to talk to pt about possible importance of insulin if her sugars were running high recently. Pt states \"I don't take any medications, I just don't\". CC tried to review some of the medications listed in Med tab- Pt again stated, \"I don't take any medications.\"     According to TCU glucose monitoring:     \"Morning time blood sugars ranging 220-370 towards the p.m. the range 370-478 currently on glargine 10 units twice daily will now increase it to 14 units twice daily.  He is also aware of other modalities of treatment including sliding scale but she just wants to be on the basal.\"    CC reviewed follow up with PCP coming up on 3/27/2023. Pt states she would like to talk to PCP about Insulin then. CC inquired if she recalls MTM being offered as well. Pt denied saying she doesn't know anything about that.     Current Behavioral Concerns: none noted.       Education Provided to patient: Care Coordination, MTM.       Health Maintenance Reviewed: Due/Overdue   Health Maintenance Due   Topic Date Due     DEXA  Never done     MAMMO SCREENING  01/04/2010     EYE EXAM  12/01/2015     COLORECTAL CANCER SCREENING  10/26/2018     MEDICARE ANNUAL WELLNESS VISIT  04/29/2020     CMP  04/29/2020     MICROALBUMIN  04/29/2020     DIABETIC FOOT EXAM  04/29/2020     FALL RISK ASSESSMENT  04/29/2020     Pneumococcal Vaccine: 65+ Years (3 - PPSV23 if available, else PCV20) 04/29/2020     ZOSTER IMMUNIZATION (2 of 2) 12/14/2021     PHQ-2 (once per calendar year)  01/01/2023       Clinical Pathway: None    Medication Management:  Medication review status: Medications reviewed.  Changes noted per patient report. Pt states she doesn't take any medications.      Functional Status:  Dependent ADLs:: Independent  Dependent IADLs:: Independent    Living Situation:  Current living arrangement:: I live alone    Lifestyle & Psychosocial Needs:    Social " Determinants of Health     Tobacco Use: Low Risk      Smoking Tobacco Use: Never     Smokeless Tobacco Use: Never     Passive Exposure: Not on file   Alcohol Use: Not on file   Financial Resource Strain: Not on file   Food Insecurity: Not on file   Transportation Needs: Not on file   Physical Activity: Not on file   Stress: Not on file   Social Connections: Not on file   Intimate Partner Violence: Not on file   Depression: Not on file   Housing Stability: Not on file     Care Coordinator has reviewed patient's Social Determinants of Health (SDoH) on this date. Upon review, changes were not  made.        Resources and Interventions:  Community Resources: None   Equipment Currently Used at Home: none     Patient/Caregiver understanding: Pt reports understanding and denies any additional questions or concerns at this times. SW CC engaged in AIDET communication during encounter.     Future Appointments              In 1 week Andrez Soares MD [facility] KRISTAL Garner          Plan: SWCC to route to PCP/Care Team for review of non-compliance with insulin glargine (LANTUS PEN) 100 UNIT/ML pen to determine if MTM is appropriate for follow up as co-visit with PCP. Pt declined ongoing outreaches from Care Coordination. No further outreaches will be made at this time unless a new referral is made or a change in the pt's status occurs. Patient was provided with this writer's contact information and encouraged to call with any questions or concerns.     SULEMA Miller  Clinic Care Coordinator  [facility]  [phone]  [email or web address]

## 2023-03-27 ENCOUNTER — OFFICE VISIT (OUTPATIENT)
Dept: PEDIATRICS | Facility: CLINIC | Age: 73
End: 2023-03-27
Payer: COMMERCIAL

## 2023-03-27 VITALS
DIASTOLIC BLOOD PRESSURE: 82 MMHG | SYSTOLIC BLOOD PRESSURE: 120 MMHG | HEIGHT: 68 IN | RESPIRATION RATE: 20 BRPM | TEMPERATURE: 98 F | OXYGEN SATURATION: 100 % | BODY MASS INDEX: 20.31 KG/M2 | WEIGHT: 134 LBS | HEART RATE: 88 BPM

## 2023-03-27 DIAGNOSIS — I10 HYPERTENSION GOAL BP (BLOOD PRESSURE) < 140/90: ICD-10-CM

## 2023-03-27 DIAGNOSIS — E11.65 TYPE 2 DIABETES MELLITUS WITH HYPERGLYCEMIA, WITHOUT LONG-TERM CURRENT USE OF INSULIN (H): Primary | ICD-10-CM

## 2023-03-27 DIAGNOSIS — S72.001A CLOSED FRACTURE OF NECK OF RIGHT FEMUR, INITIAL ENCOUNTER (H): ICD-10-CM

## 2023-03-27 DIAGNOSIS — Z78.0 ASYMPTOMATIC POSTMENOPAUSAL STATUS: ICD-10-CM

## 2023-03-27 DIAGNOSIS — Z12.31 VISIT FOR SCREENING MAMMOGRAM: ICD-10-CM

## 2023-03-27 PROBLEM — E11.9 DIABETES MELLITUS, TYPE 2 (H): Status: ACTIVE | Noted: 2023-03-27

## 2023-03-27 LAB — HBA1C MFR BLD: 12.9 % (ref 0–5.6)

## 2023-03-27 PROCEDURE — 82043 UR ALBUMIN QUANTITATIVE: CPT | Performed by: INTERNAL MEDICINE

## 2023-03-27 PROCEDURE — 36415 COLL VENOUS BLD VENIPUNCTURE: CPT | Performed by: INTERNAL MEDICINE

## 2023-03-27 PROCEDURE — 82570 ASSAY OF URINE CREATININE: CPT | Performed by: INTERNAL MEDICINE

## 2023-03-27 PROCEDURE — 83036 HEMOGLOBIN GLYCOSYLATED A1C: CPT | Performed by: INTERNAL MEDICINE

## 2023-03-27 PROCEDURE — 99214 OFFICE O/P EST MOD 30 MIN: CPT | Performed by: INTERNAL MEDICINE

## 2023-03-27 RX ORDER — ATORVASTATIN CALCIUM 40 MG/1
40 TABLET, FILM COATED ORAL DAILY
Qty: 90 TABLET | Refills: 3 | Status: SHIPPED | OUTPATIENT
Start: 2023-03-27 | End: 2023-05-02

## 2023-03-27 RX ORDER — LISINOPRIL 5 MG/1
5 TABLET ORAL DAILY
Qty: 90 TABLET | Refills: 3 | Status: SHIPPED | OUTPATIENT
Start: 2023-03-27 | End: 2023-05-02

## 2023-03-27 RX ORDER — CHOLECALCIFEROL (VITAMIN D3) 50 MCG
1 TABLET ORAL DAILY
Qty: 90 TABLET | Refills: 3 | COMMUNITY
Start: 2023-03-27 | End: 2023-05-02

## 2023-03-27 ASSESSMENT — PAIN SCALES - GENERAL: PAINLEVEL: NO PAIN (0)

## 2023-03-27 NOTE — PATIENT INSTRUCTIONS
Let's begin:    Aspirin 81 mg.  Atorvastatin 40mg.  Lisinopril 5 mg.    I'd like to start you on insulin, but we'll see if [place] pharmacist can help us determine which is most well-covered.    They will call you for DEXA scan (bone density test for osteoporosis).      Up to date with shot.    Due for mammogram.    Follow up with me in 3 months.

## 2023-03-27 NOTE — PROGRESS NOTES
Assessment & Plan     Visit for screening mammogram  Refuses.     Hypertension goal BP (blood pressure) < 140/90  At goal. But begin ACE inhibitor.     Type 2 diabetes mellitus with hyperglycemia, without long-term current use of insulin (H)  Patient does not want any orals.  She states that insulin is too expensive.  Will refer to MTM to help her get started on an affordable insulin regimen.  Begin ACE inhibitor, aspirin, and Stati.   - Hemoglobin A1c; Future  - Albumin Random Urine Quantitative with Creat Ratio; Future    Asymptomatic postmenopausal status  Begin calcium and vitamin D.  Likely will need bisphosphanate but patient is very easily overwhelmed.  Get DEXA scan (bone density test for osteoporosis) first.   - DX Hip/Pelvis/Spine; Future           MED REC REQUIRED  Post Medication Reconciliation Status:       Patient Instructions   Let's begin:    Aspirin 81 mg.  Atorvastatin 40mg.  Lisinopril 5 mg.    I'd like to start you on insulin, but we'll see if MTM pharmacist can help us determine which is most well-covered.    They will call you for DEXA scan (bone density test for osteoporosis).      Up to date with shot.    Due for mammogram.    Follow up with me in 3 months.        Andrez Soares MD  [facility] JANAY Rodriguez is a 72 year old, presenting for the following health issues:  Hospital F/U  No flowsheet data found.  HPI         Hospital Follow-up Visit:    Hospital/Nursing Home/IP Rehab Facility: TCU  Date of Admission: 01/30/2023   Date of Discharge: 02/03//2023 from tcu  Reason(s) for Admission: Break to Rt femur    Was your hospitalization related to COVID-19? No   Problems taking medications regularly: NA  Medication changes since discharge: None  Problems adhering to non-medication therapy:  NA    Summary of hospitalization:  [facility] discharge summary reviewed  Diagnostic Tests/Treatments reviewed.  Follow up needed: none  Other Healthcare  "Providers Involved in Patient s Care:         None  Update since discharge: improved.     Fell and broke right hip in January.  Eventually admitted for this.  Her poorly controlled diabetes mellitus then resulted in DKA.      Screws placed.  Placed in a TCU for 2 weeks.      Got home Feb 17.  Hip is better, still limping.  Still stiff.     Not on any diabetes mellitus meds.  Stopped lantus because it was too expensive:  Metformin cause diarhea.  Actos \"didn't work.\"  Insulin is too expensive  Glimepiride: not sure why this wasn't continued.        Plan of care communicated with patient                 Review of Systems   CONSTITUTIONAL: NEGATIVE for fever, chills, change in weight  INTEGUMENTARY/SKIN: NEGATIVE for worrisome rashes, moles or lesions  EYES: NEGATIVE for vision changes or irritation  ENT/MOUTH: NEGATIVE for ear, mouth and throat problems  RESP: NEGATIVE for significant cough or SOB  BREAST: NEGATIVE for masses, tenderness or discharge  CV: NEGATIVE for chest pain, palpitations or peripheral edema  GI: NEGATIVE for nausea, abdominal pain, heartburn, or change in bowel habits  : NEGATIVE for frequency, dysuria, or hematuria  MUSCULOSKELETAL: NEGATIVE for significant arthralgias or myalgia  NEURO: NEGATIVE for weakness, dizziness or paresthesias  ENDOCRINE: NEGATIVE for temperature intolerance, skin/hair changes  HEME: NEGATIVE for bleeding problems  PSYCHIATRIC: NEGATIVE for changes in mood or affect      Objective    /82   Pulse 88   Temp 98  F (36.7  C) (Oral)   Resp 20   Ht 1.727 m (5' 8\")   Wt 60.8 kg (134 lb)   SpO2 100%   BMI 20.37 kg/m    Body mass index is 20.37 kg/m .  Physical Exam   GENERAL: healthy, alert and no distress  EYES: Eyes grossly normal to inspection, PERRL and conjunctivae and sclerae normal  HENT: ear canals and TM's normal, nose and mouth without ulcers or lesions  NECK: no adenopathy, no asymmetry, masses, or scars and thyroid normal to palpation  RESP: lungs " clear to auscultation - no rales, rhonchi or wheezes  CV: regular rate and rhythm, normal S1 S2, no S3 or S4, no murmur, click or rub, no peripheral edema and peripheral pulses strong  ABDOMEN: soft, nontender, no hepatosplenomegaly, no masses and bowel sounds normal  MS: no gross musculoskeletal defects noted, no edema  SKIN: no suspicious lesions or rashes  NEURO: Normal strength and tone, mentation intact and speech normal  PSYCH: mentation appears normal, affect normal/bright                  cl

## 2023-03-28 LAB
CREAT UR-MCNC: 39.7 MG/DL
MICROALBUMIN UR-MCNC: <12 MG/L
MICROALBUMIN/CREAT UR: NORMAL MG/G{CREAT}

## 2023-03-29 ENCOUNTER — ANCILLARY PROCEDURE (OUTPATIENT)
Dept: BONE DENSITY | Facility: CLINIC | Age: 73
End: 2023-03-29
Attending: INTERNAL MEDICINE
Payer: COMMERCIAL

## 2023-03-29 ENCOUNTER — TELEPHONE (OUTPATIENT)
Dept: PEDIATRICS | Facility: CLINIC | Age: 73
End: 2023-03-29

## 2023-03-29 DIAGNOSIS — Z78.0 ASYMPTOMATIC POSTMENOPAUSAL STATUS: ICD-10-CM

## 2023-03-29 DIAGNOSIS — Z78.0 MENOPAUSE: ICD-10-CM

## 2023-03-29 PROCEDURE — 77081 DXA BONE DENSITY APPENDICULR: CPT | Performed by: INTERNAL MEDICINE

## 2023-03-29 PROCEDURE — 77085 DXA BONE DENSITY AXL VRT FX: CPT | Performed by: INTERNAL MEDICINE

## 2023-03-29 NOTE — TELEPHONE ENCOUNTER
MTM referral from: [facility] visit (referral by provider)    MTM referral outreach attempt #2 on March 29, 2023 at 12:50 PM      Outcome: Patient not reachable after several attempts, will route to MTM Pharmacist/Provider as an FYI.  [facility] scheduling number is [phone].  Thank you for the referral.  Maris Pritchett - [facility]

## 2023-04-04 ENCOUNTER — TELEPHONE (OUTPATIENT)
Dept: PEDIATRICS | Facility: CLINIC | Age: 73
End: 2023-04-04
Payer: COMMERCIAL

## 2023-04-04 NOTE — RESULT ENCOUNTER NOTE
Note to staff: Please call the patient to explain results.    The results from your recent dexa scan shows that it may be recommended that you consider treatment for your bone density along with supplemental calcium and vitamin D.  Please be sure to make a followup appointment to discuss.  This appointment can be done with anyone in the clinic and can be virtual.  Please schedule this when you are able.      Thank you for choosing [facility] for your health care needs,      Maris Boyer PA-C

## 2023-04-04 NOTE — LETTER
2023      Jennifer SAHA Fernandez  [address] [place] [address]        Dear ,    We are writing to inform you of your test results.    Test results indicate you may require additional follow up, see comment below.     The results from your recent dexa scan shows that it may be recommended that you consider treatment for your bone density along with supplemental calcium and vitamin D.  Please be sure to make a followup appointment to discuss.  This appointment can be done with anyone in the clinic and can be virtual.      Resulted Orders   DX Wrist Heel Radius    Narrative    BONE DENSITOMETRY  [facility]  [address]  [place] [address]  3/30/2023      PATIENT: Jennifer Presley  CHART: [id number]   :  1950  AGE:  72 year old  SEX:  female   REFERRING PROVIDER:  Andrez Soares MD     PROCEDURE:  Bone density scanning was performed using DXA technology of   the lumbar spine and hip.  Scanning was performed on a Lunar Prodigy   scanner.  Reporting is completed in the form of a T-score.  The T-score   represents the standard deviation from peak bone mass based on a young   healthy adult.     REFERENCE T-SCORES:       Normal                -1.0 and greater                                 Osteopenia         Between -1.0 and -2.5                                             Osteoporosis     -2.5 and less                                       RISK FACTORS:  Post-menopausal, Hip fracture    CURRENT TREATMENT:  None listed     FINDINGS:               Lumbar Spine (L1-L4)      T-score:  0.9, marked degenerative   changes present                Left Femoral Neck            T-score:  -2.0               Forearm (radius 33%)      T-score:  -2.2  The right femur is not acceptable for evaluation due to previous surgical   repair.                   Lumbar (L1-L4) BMD: 1.302                Left Total Hip BMD: 0.881               Forearm (radius 33%) BMD:  "0.680    LATERAL VERTEBRAL ASSESSMENT  Procedure:  Vertebral fracture assessment was performed in the lateral   decubitus position using a Lunar Prodigy  densitometer.  Indications for VFA: T-score of -1.0 or worse and age (female>69)  Confounding factors for VFA: None.  The LVA scan is interpretable from T8   to L5.    VFA Findings: Using the semi-quantitative analysis of [employer] there was   evidence of no spinal deformity  VFA Impression: Jennifer Presley has no vertebral fractures   identified on the VFA.     IMPRESSION  Severe osteoporosis on the basis of hip fracture.  Degenerative changes at   the lumbar spine may falsely elevate results.     Recommendations include ensuring adequate Calcium and Vitamin D.    The current NOF Guidelines recommend treatment for patients with prior hip   or vertebral fracture, T-score -2.5 or below, or 10 year risk of any major   osteoporotic fracture 20% or greater, or 10 year risk of hip fracture 3%   or greater as calculated using the FRAX calculator (www.shef.ac.uk/FRAX or   you can google \"FRAX\").      Based on these guidelines, treatment (in addition to calcium and vitamin   D) is recommended for this patient, after ruling out other causes of   osteoporosis.  This is meant as an aid to clinical decision making; one must still use   clinical judgement.    Follow up can be considered in 2 years.   ___________________  Rafia Chan M.D.  Electronically signed            If you have any questions or concerns, please call the clinic at the number listed above.       Sincerely,      [facility]            "

## 2023-04-04 NOTE — TELEPHONE ENCOUNTER
Attempted to reach patient, LVMTCB. Need to review provider result note below with patient.     Hammad DESHPANDE RN 4/4/2023 at 3:21 PM

## 2023-04-04 NOTE — TELEPHONE ENCOUNTER
----- Message from Jewell Mcgowan RN sent at 4/4/2023  2:58 PM CDT -----    ----- Message -----  From: Maris Boyer PA-C  Sent: 4/4/2023   2:57 PM CDT  To: Chi Aguirre3/5 Osmel HERNANDEZ (Rn)    Note to staff: Please call the patient to explain results.    The results from your recent dexa scan shows that it may be recommended that you consider treatment for your bone density along with supplemental calcium and vitamin D.  Please be sure to make a followup appointment to discuss.  This appointment can be done with anyone in the clinic and can be virtual.  Please schedule this when you are able.      Thank you for choosing [facility] for your health care needs,      Maris Boyer PA-C

## 2023-04-06 NOTE — TELEPHONE ENCOUNTER
Called and spoke with patient. RN relayed provider result note below. Patient prefers to discuss this at upcoming appointment w/ PCP in July. Printed results and mailed to patient per patient request.     Hammad DESHPANDE RN 4/6/2023 at 11:16 AM

## 2023-04-26 NOTE — PROGRESS NOTES
Medication Therapy Management (MTM) Encounter    ASSESSMENT:                            Medication Adherence/Access: See below for considerations    Type 2 Diabetes: Patient is not meeting A1c goal of < 7%. Self monitoring of blood glucose is not at goal of fasting  mg/dL. Patient would benefit from minimum SMBG: Check blood sugars fasting, and occasionally 2 hours after starting a meal and Basal Insulin (Lantus) :  Start at dose : 0.2 units per kg.  Glucometer given today and we reviewed how to test blood sugars as well as administration and storage of insulin, hypoglycemia symptoms.  Information given in diabetic booklet and after visit summary.  Also referred to [facility] website for more information.  With aspirin for primary prevention and age >70, recommend discontinue aspirin.    Hypertension: Patient is meeting blood pressure goal of < 130/80mmHg.  With not taking lisinopril for several days and blood pressure being on lower side today and some recent balance issues, consider discontinuing lisinopril    Hyperlipidemia: recheck lipid panel in 2-3 months    Osteoporosis: Patient would benefit from:bisphosphonate therapy but she would like to review this more before starting, she did not want to start today    Vaccines: Due for Shingrix and Pneumonia vaccines per ACIP/CDC Guidelines: Discussed receiving vaccines at local pharmacy briefly.    PLAN:                            Diabetes:   Start Lantus 12 units once daily and increase by 2 units every 3 days for blood sugars, 130 mg/dL, max daily dose 30 units  Check blood sugars in the morning before eating and 2 hours after largest meal a few times a week  Eye exam due    Osteoporosis: treatment recommended, look into alendronate/Fosamax or risedronate/Actonel    Vaccines: Consider receiving Shingrix and Pneumonia  vaccines at local pharmacy.     Kidney protection:  Lisinopril 5mg 1/2 tablet every day      I will ask Dr. Soares about stopping aspirin and  "lisinopril.  We can ask Dr. Soares about dizziness in the future.  Addend: Dr. Soares approves these changes, spoke to patient and will stop lisinopril and aspirin    Follow-up: July to see Dr. Soares Return in 2 weeks (on 5/16/2023) for MTM Pharmacist Visit, clinic visit.    SUBJECTIVE/OBJECTIVE:                          Jennifer Presley is a 72 year old female coming in for an initial visit. She was referred to me from Dr. Soares.  Hospitalized 1/30/23 to 2/3/2023 due to mechanical fall, right femoral neck fracture, diabetes and then was in TCU at [facility]    Reason for visit: diabetes, need to know about insulin, insulin was to be refrigerated so she threw it away, she needs help  Lantus is covered.  She got bill for $148 for 3 month supply.  That was 3 months ago, 2/3/2023    Allergies/ADRs: Reviewed in chart  Past Medical History: Reviewed in chart  Tobacco: She reports that she has never smoked. She has never used smokeless tobacco.  Alcohol: 1-2 beverages / day  Caffeine: tea 5-6 cups per day  Activity: not much    Medication Adherence/Access: Patient uses pill box(es).  Patient takes medications 1 time(s) per day.   Per patient, misses medication 0 times per week.   Medication barriers: fears/concerns about: does not like to take medications.   The patient fills medications at [facility]: NO, fills medications at [facility].      Type 2 Diabetes:  Currently taking no medications.   She has ongoing diarrhea,not sure why, long term.  Per chart review:  Stopped lantus because it was too expensive:  Metformin cause diarrhea.  Actos \"didn't work.\"  Insulin is too expensive  Glimepiride: not sure why this wasn't continued.  Blood sugar monitoring: BG meter taught today. 346  Symptoms of low blood sugar? none  Symptoms of high blood sugar? Did not review today  Eye exam: due  Foot exam: due  Aspirin: Taking 1/2 tablet 325mg daily for primary prevention   The 10-year ASCVD risk score (Marcelino ROONEY, et al., 2019) is: 17.7%    " Values used to calculate the score:      Age: 72 years      Sex: Female      Is Non- : No      Diabetic: Yes      Tobacco smoker: No      Systolic Blood Pressure: 105 mmHg      Is BP treated: Yes      HDL Cholesterol: 58 mg/dL      Total Cholesterol: 202 mg/dL    Statin: Yes   ACEi/ARB: Yes.   Urine Albumin:   Lab Results   Component Value Date    UMALCR  03/27/2023      Comment:      Unable to calculate, urine albumin and/or urine creatinine is outside detectable limits.  Microalbuminuria is defined as an albumin:creatinine ratio of 17 to 299 for males and 25 to 299 for females. A ratio of albumin:creatinine of 300 or higher is indicative of overt proteinuria.  Due to biologic variability, positive results should be confirmed by a second, first-morning random or 24-hour timed urine specimen. If there is discrepancy, a third specimen is recommended. When 2 out of 3 results are in the microalbuminuria range, this is evidence for incipient nephropathy and warrants increased efforts at glucose control, blood pressure control, and institution of therapy with an angiotensin-converting-enzyme (ACE) inhibitor (if the patient can tolerate it).        Lab Results   Component Value Date    A1C 12.9 03/27/2023    A1C 13.2 01/30/2023    A1C 14.5 04/29/2019    A1C 11.7 09/21/2015    A1C 10.6 06/10/2015    A1C 9.5 12/17/2013    A1C 8.5 09/24/2013     Hypertension: Current medications include lisinopril 5mg every day every other day, took on Saturday the last time.  Patient does not self-monitor blood pressure.  Patient reports the following medication side effects: Has balance issues and got disoriented and fell, worse since had surgery  BP Readings from Last 3 Encounters:   05/02/23 105/68   03/27/23 120/82   02/13/23 118/78     Lab Results   Component Value Date    UCRR 39.7 03/27/2023    MICROL <12.0 03/27/2023    UMALCR  03/27/2023      Comment:      Unable to calculate, urine albumin and/or urine  creatinine is outside detectable limits.  Microalbuminuria is defined as an albumin:creatinine ratio of 17 to 299 for males and 25 to 299 for females. A ratio of albumin:creatinine of 300 or higher is indicative of overt proteinuria.  Due to biologic variability, positive results should be confirmed by a second, first-morning random or 24-hour timed urine specimen. If there is discrepancy, a third specimen is recommended. When 2 out of 3 results are in the microalbuminuria range, this is evidence for incipient nephropathy and warrants increased efforts at glucose control, blood pressure control, and institution of therapy with an angiotensin-converting-enzyme (ACE) inhibitor (if the patient can tolerate it).       Hyperlipidemia: Current therapy includes atorvastatin 20mg every day (started end of March) every other day.  Patient reports no significant myalgias or other side effects.  Recent Labs   Lab Test 01/31/23  0552 04/29/19  0936 09/21/15  0804 04/22/15  0937   CHOL 202* 256* 209* 189   HDL 58 44* 32* 35*   * 171* 129 121   TRIG 68 206* 239* 167*   CHOLHDLRATIO  --   --  6.5* 5.4*       Osteoporosis: Current therapy includes: calcium 600/Vitamin D 1/2 tablet every day. She is not sure she wants treatment for the osteoporosis. Patient is not experiencing side effects.  Patient is getting the following sources of dietary calcium: we did not discuss today  DEXA done 3/29/23 Severe osteoporosis on the basis of hip fracture.  Degenerative changes at the lumbar spine may falsely elevate results.  Lumbar Spine (L1-L4)      T-score:  0.9, marked degenerative changes present                Left Femoral Neck            T-score:  -2.0               Forearm (radius 33%)      T-score:  -2.2  Treatment recommended    Most Recent Immunizations   Administered Date(s) Administered     COVID-19 Bivalent 12+ (Pfizer) 10/20/2022     COVID-19 MONOVALENT 12+ (Pfizer) 09/25/2021     COVID-19 Monovalent 12+ (Pfizer 2022)  04/09/2022     FLU 6-35 months 10/26/2007     Flu, Unspecified 10/18/2021     Influenza (High Dose) 3 valent vaccine 10/18/2021     Influenza Vaccine 65+ (Fluzone HD) 10/18/2021     Influenza Vaccine >6 months (Alfuria,Fluzone) 10/18/2022     Pneumo Conj 13-V (2010&after) 04/29/2019     Pneumococcal 23 valent 10/26/2007     TDAP (Adacel,Boostrix) 10/19/2021     TDAP Vaccine (Adacel) 06/09/2011     Td (Adult), Adsorbed 07/13/1999     Zoster recombinant adjuvanted (SHINGRIX) 10/19/2021       Today's Vitals: /68   Pulse 89   Wt 134 lb 6.4 oz (61 kg)   SpO2 100%   BMI 20.44 kg/m    ----------------      I spent 65 minutes with this patient today. All changes were made via collaborative practice agreement with Andrez Soares MD. A copy of the visit note was provided to the patient's provider(s).    A summary of these recommendations was given to the patient.    Analilia Lacey, PharmD [facility]  Medication Therapy Management Practitioner   #[phone]     Medication Therapy Recommendations  Diabetes mellitus, type 2 (H)    Current Medication: insulin glargine (LANTUS PEN) 100 UNIT/ML pen   Rationale: Medication requires monitoring - Needs additional monitoring - Effectiveness   Recommendation: Self-Monitoring - Accu-Chek Guide Me w/Device Kit   Status: Accepted per CPA          Current Medication: lisinopril (ZESTRIL) 5 MG tablet   Rationale: Undesirable effect - Adverse medication event - Safety   Recommendation: Discontinue Medication - Recommend discontinue lisinopril and aspirin   Status: Contact Provider - Awaiting Response          Rationale: Untreated condition - Needs additional medication therapy - Indication   Recommendation: Start Medication - LANTUS SOLOSTAR SC   Status: Accepted per CPA         Osteoporosis with current pathological fracture with routine healing, unspecified osteoporosis type, subsequent encounter    Rationale: Untreated condition - Needs additional medication therapy - Indication    Recommendation: Start Medication - ALENDRONATE SODIUM   Status: Declined per Patient

## 2023-04-27 ENCOUNTER — TRANSFERRED RECORDS (OUTPATIENT)
Dept: HEALTH INFORMATION MANAGEMENT | Facility: CLINIC | Age: 73
End: 2023-04-27
Payer: COMMERCIAL

## 2023-05-02 ENCOUNTER — OFFICE VISIT (OUTPATIENT)
Dept: PHARMACY | Facility: CLINIC | Age: 73
End: 2023-05-02
Payer: COMMERCIAL

## 2023-05-02 VITALS
HEART RATE: 89 BPM | DIASTOLIC BLOOD PRESSURE: 68 MMHG | OXYGEN SATURATION: 100 % | WEIGHT: 134.4 LBS | SYSTOLIC BLOOD PRESSURE: 105 MMHG | BODY MASS INDEX: 20.44 KG/M2

## 2023-05-02 DIAGNOSIS — Z71.85 VACCINE COUNSELING: ICD-10-CM

## 2023-05-02 DIAGNOSIS — E78.5 HYPERLIPIDEMIA LDL GOAL <100: ICD-10-CM

## 2023-05-02 DIAGNOSIS — I10 HYPERTENSION GOAL BP (BLOOD PRESSURE) < 140/90: ICD-10-CM

## 2023-05-02 DIAGNOSIS — E11.65 TYPE 2 DIABETES MELLITUS WITH HYPERGLYCEMIA, WITHOUT LONG-TERM CURRENT USE OF INSULIN (H): Primary | ICD-10-CM

## 2023-05-02 DIAGNOSIS — M80.00XD OSTEOPOROSIS WITH CURRENT PATHOLOGICAL FRACTURE WITH ROUTINE HEALING, UNSPECIFIED OSTEOPOROSIS TYPE, SUBSEQUENT ENCOUNTER: ICD-10-CM

## 2023-05-02 PROCEDURE — 99607 MTMS BY PHARM ADDL 15 MIN: CPT | Performed by: PHARMACIST

## 2023-05-02 PROCEDURE — 99605 MTMS BY PHARM NP 15 MIN: CPT | Performed by: PHARMACIST

## 2023-05-02 RX ORDER — BLOOD SUGAR DIAGNOSTIC
STRIP MISCELLANEOUS
Qty: 100 STRIP | Refills: 11 | Status: SHIPPED | OUTPATIENT
Start: 2023-05-02 | End: 2024-06-17

## 2023-05-02 RX ORDER — LISINOPRIL 5 MG/1
2.5 TABLET ORAL DAILY
Qty: 90 TABLET | Refills: 3
Start: 2023-05-02 | End: 2023-05-03

## 2023-05-02 RX ORDER — CALCIUM CARBONATE/VITAMIN D3 600 MG-10
0.5 TABLET ORAL DAILY
COMMUNITY

## 2023-05-02 RX ORDER — LANCETS
EACH MISCELLANEOUS
Qty: 100 EACH | Refills: 11 | Status: SHIPPED | OUTPATIENT
Start: 2023-05-02

## 2023-05-02 RX ORDER — ATORVASTATIN CALCIUM 40 MG/1
20 TABLET, FILM COATED ORAL EVERY OTHER DAY
Start: 2023-05-02 | End: 2024-01-31

## 2023-05-02 RX ORDER — BLOOD-GLUCOSE METER
1 EACH MISCELLANEOUS ONCE
Qty: 1 KIT | Refills: 0 | Status: SHIPPED | OUTPATIENT
Start: 2023-05-02 | End: 2023-05-02

## 2023-05-02 NOTE — LETTER
_  Medication List        Prepared on: May 2, 2023     Bring your Medication List when you go to the doctor, hospital, or   emergency room. And, share it with your family or caregivers.     Note any changes to how you take your medications.  Cross out medications when you no longer use them.    Medication How I take it Why I use it Prescriber   aspirin (ASA) 325 MG EC tablet Take 162 mg by mouth every other day  Diabetes Patient Reported   atorvastatin (LIPITOR) 40 MG tablet Take 0.5 tablets (20 mg) by mouth every other day Hyperlipidemia LDL Goal <100 Andrez Soares MD   blood glucose (ACCU-CHEK GUIDE) test strip Use to test blood sugar 3 times daily or as directed. May substitute if needed for insurance coverage. Type 2 diabetes mellitus with hyperglycemia, without long-term current use of insulin (H) Andrez Soares MD   blood glucose monitoring (SOFTCLIX) lancets Use to test blood sugar 3 times daily or as directed. May substitute if needed for insurance coverage. Type 2 diabetes mellitus with hyperglycemia, without long-term current use of insulin (H) Andrez Soares MD   Blood Glucose Monitoring Suppl (ACCU-CHEK GUIDE) w/Device KIT 1 Device once for 1 dose If insurance covers Accu-chek guide me she has this meter otherwise May substitute if needed for insurance coverage. Type 2 diabetes mellitus with hyperglycemia, without long-term current use of insulin (H) Andrez Soares MD   calcium carbonate-vitamin D (CALTRATE) 600-10 MG-MCG per tablet Take 0.5 tablets by mouth daily   Patient Reported   insulin glargine (LANTUS PEN) 100 UNIT/ML pen Inject 12 Units Subcutaneous At Bedtime Increase by 2 units every 3 days if morning blood sugar are >130 mg/dL, max daily dose 30 units Type 2 diabetes mellitus with hyperglycemia, without long-term current use of insulin (H) Andrez Soares MD   lisinopril (ZESTRIL) 5 MG tablet Take 0.5 tablets (2.5 mg) by mouth daily Type 2 diabetes mellitus with hyperglycemia, without long-term  current use of insulin (H); Hypertension Goal BP (Blood Pressure) < 140/90 Andrez Soares MD         Add new medications, over-the-counter drugs, herbals, vitamins, or  minerals in the blank rows below.    Medication How I take it Why I use it Prescriber                                      Allergies:      No Known Allergies        Side effects I have had:               Other Information:              My notes and questions:

## 2023-05-02 NOTE — Clinical Note
Blood pressure running lower without lisinopril for 3 days, wondering about stopping and since aspirin for primary with age >70, not recommended, can we stop aspirin and lisinopril?

## 2023-05-02 NOTE — PATIENT INSTRUCTIONS
Recommendations from today's MTM visit:                                                    MTM (medication therapy management) is a service provided by a clinical pharmacist designed to help you get the most of out of your medicines.     Diabetes:   Start Lantus 12 units once daily and increase by 2 units every 3 days for blood sugars, 130 mg/dL, max daily dose 30 units  Check blood sugars in the morning before eating and 2 hours after largest meal a few times a week  Eye exam due  Video on insulin  FPA Diabetes:  http://www.fpanetwork.org/diabetes    Osteoporosis: treatment recommended, look into alendronate/Fosamax or risedronate/Actonel    Vaccines: Consider receiving Shingrix and Pneumonia  vaccines at local pharmacy.     Kidney protection:  Lisinopril 5mg 1/2 tablet every day      I will ask Dr. Soares about stopping aspirin and lisinopril.  We can ask Dr. Soares about dizziness in the future.    Follow-up: July to see Dr. Soares Return in 2 weeks (on 5/16/2023) for MTM Pharmacist Visit, clinic visit.    To schedule another MTM appointment, please call the clinic directly or you may call the MTM scheduling line at [phone] or toll-free at [phone].     My Clinical Pharmacist's contact information:                                                      It was a pleasure seeing you today!  Please feel free to contact me with any questions or concerns you have.      Analilia Lacey, PharmD [facility]  Medication Therapy Management Practitioner   #[phone]    It was great to speak with you today.  I value your experience and would be very thankful for your time with providing feedback on our clinic survey. You may receive a survey via email or text message in the next few days.

## 2023-05-02 NOTE — LETTER
May 2, 2023  Jennifer Presley  [address] [place] [address]    Dear RAKEL Overton [facility] JANAY     Thank you for talking with me on May 2, 2023 about your health and medications. As a follow-up to our conversation, I have included two documents:      1. Your Recommended To-Do List has steps you should take to get the best results from your medications.  2. Your Medication List will help you keep track of your medications and how to take them.    If you want to talk about these documents, please call Analilia Lacey RPH at phone: [phone], Monday-Friday 8-4:30pm.    I look forward to working with you and your doctors to make sure your medications work well for you.    Sincerely,  Analilia Lacey RPH  [facility] Pharmacist, [facility]

## 2023-05-02 NOTE — LETTER
"Recommended To-Do List      Prepared on: May 2, 2023       You can get the best results from your medications by completing the items on this \"To-Do List.\"      Bring your To-Do List when you go to your doctor. And, share it with your family or caregivers.    My To-Do List:  What we talked about: What I should do:   An issue with your medication    I will ask Dr. Soares about stopping taking lisinopril (ZESTRIL) and aspirin          What we talked about: What I should do:   Needing additional monitoring    Self-monitor your blood sugars in the morning before eating once daily and 2 hours after largest meal a few times a week           What we talked about: What I should do:   A new medication that may be of benefit to you    Start taking LANTUS SOLOSTAR SC          What we talked about: What I should do:                     "

## 2023-05-14 NOTE — PROGRESS NOTES
Medication Therapy Management (MTM) Encounter    ASSESSMENT:                            Medication Adherence/Access: See below for considerations    Type 2 Diabetes: Self monitoring of blood glucose is not at goal of fasting  mg/dL. Patient would benefit from Basal Insulin (insulin glargine) :  increase dose to 20units and continue to increase by 2 units weekly.  Discussed she may need rapid acting insulin to cover meals     Hypertension: Stable. Patient is meeting blood pressure goal of < 130/80mmHg. Continue off the lisinopril     Hyperlipidemia: stable, she wants to continuing every other day dosing, recheck lipid panel in 2-3 months     Osteoporosis: Patient would benefit from:bisphosphonate therapy, she does not want this today but will look into coverage, discussed last visit as well.    PLAN:                            Diabetes: Lantus 20 units once daily, continue to increase the Lantus by 2 units every 7 days    You may need a rapid acting insulin such as Novolog (insulin aspart) or Humalog (insulin lispro)    Osteoporosis: treatment recommended, continue to look into alendronate/Fosamax (once weekly) or risedronate/Actonel (once monthly).  Great job on walking.    Follow-up: Return in about 29 days (around 6/14/2023) for MTM Pharmacist Visit.    SUBJECTIVE/OBJECTIVE:                          Jennifer Presley is a 72 year old female coming in for a follow-up visit.  Today's visit is a follow-up MTM visit from 5/2/23   Reason for visit: follow up on starting insulin.    Allergies/ADRs: Reviewed in chart  Past Medical History: Reviewed in chart  Tobacco: She reports that she has never smoked. She has never used smokeless tobacco.  Alcohol: 1-2 beverages / day  Caffeine: tea 5-6 cups per day  Activity: not much    Medication Adherence/Access: Patient uses pill box(es).  Patient takes medications 1 time(s) per day.   Per patient, misses medication a few times per week on insulin.   Medication barriers:  "fears/concerns about: does not like to take medications.   The patient fills medications at [place]: NO, fills medications at [facility].    Type 2 Diabetes:  Currently taking Lantus 16 units every day for about the last week.  She has ongoing diarrhea,not sure why, long term.  Per chart review:  Stopped lantus because it was too expensive:  Metformin cause diarrhea.  Actos \"didn't work.\"  Insulin is too expensive  Glimepiride: not sure why this wasn't continued.  Blood sugar monitoring: Hard to check 2 hours due to snacks throughout the day  14 day average 375  Date FBG/ 2hours post Lunch/2hours post Bedtime /2hours post    5/16 320      5/15 286  422    5/14 227  354    5/13 255  327    5/12   410    5/11 247      5/10   342        Symptoms of low blood sugar? none  Symptoms of high blood sugar? Did not review today  Eye exam: due  Foot exam: due  Aspirin: Taking 1/2 tablet 325mg daily for primary prevention   The 10-year ASCVD risk score (Marcelino ROONEY, et al., 2019) is: 17.7%    Values used to calculate the score:      Age: 72 years      Sex: Female      Is Non- : No      Diabetic: Yes      Tobacco smoker: No      Systolic Blood Pressure: 105 mmHg      Is BP treated: Yes      HDL Cholesterol: 58 mg/dL      Total Cholesterol: 202 mg/dL    Statin: Yes   ACEi/ARB: Yes.   Urine Albumin:   Lab Results   Component Value Date    ALCR  03/27/2023      Comment:      Unable to calculate, urine albumin and/or urine creatinine is outside detectable limits.  Microalbuminuria is defined as an albumin:creatinine ratio of 17 to 299 for males and 25 to 299 for females. A ratio of albumin:creatinine of 300 or higher is indicative of overt proteinuria.  Due to biologic variability, positive results should be confirmed by a second, first-morning random or 24-hour timed urine specimen. If there is discrepancy, a third specimen is recommended. When 2 out of 3 results are in the microalbuminuria range, this is " evidence for incipient nephropathy and warrants increased efforts at glucose control, blood pressure control, and institution of therapy with an angiotensin-converting-enzyme (ACE) inhibitor (if the patient can tolerate it).        Lab Results   Component Value Date    A1C 12.9 03/27/2023    A1C 13.2 01/30/2023    A1C 14.5 04/29/2019    A1C 11.7 09/21/2015    A1C 10.6 06/10/2015    A1C 9.5 12/17/2013    A1C 8.5 09/24/2013     Wt Readings from Last 4 Encounters:   05/16/23 138 lb 12.8 oz (63 kg)   05/02/23 134 lb 6.4 oz (61 kg)   03/27/23 134 lb (60.8 kg)   02/13/23 134 lb 3.2 oz (60.9 kg)     Hypertension: Current medications include none, she stopped lisinopril 5mg every day every other day. She feels better of the lisinopril, not as dizzy  Patient does not self-monitor blood pressure.  Patient reports the following medication side effects: none    Lab Results   Component Value Date    UCRR 39.7 03/27/2023    MICROL <12.0 03/27/2023    UMALCR  03/27/2023      Comment:      Unable to calculate, urine albumin and/or urine creatinine is outside detectable limits.  Microalbuminuria is defined as an albumin:creatinine ratio of 17 to 299 for males and 25 to 299 for females. A ratio of albumin:creatinine of 300 or higher is indicative of overt proteinuria.  Due to biologic variability, positive results should be confirmed by a second, first-morning random or 24-hour timed urine specimen. If there is discrepancy, a third specimen is recommended. When 2 out of 3 results are in the microalbuminuria range, this is evidence for incipient nephropathy and warrants increased efforts at glucose control, blood pressure control, and institution of therapy with an angiotensin-converting-enzyme (ACE) inhibitor (if the patient can tolerate it).       Hyperlipidemia: Current therapy includes atorvastatin 20mg every day (started end of March) every other day.  Patient reports no significant myalgias or other side effects.  Recent Labs    Lab Test 01/31/23  0552 04/29/19  0936 09/21/15  0804 04/22/15  0937   CHOL 202* 256* 209* 189   HDL 58 44* 32* 35*   * 171* 129 121   TRIG 68 206* 239* 167*   CHOLHDLRATIO  --   --  6.5* 5.4*       Osteoporosis: Current therapy includes: calcium 600/Vitamin D 1/2 tablet every day. She is not sure she wants treatment for the osteoporosis. Patient is not experiencing side effects.  Patient is getting the following sources of dietary calcium: we did not discuss today  DEXA done 3/29/23 Severe osteoporosis on the basis of hip fracture.  Degenerative changes at the lumbar spine may falsely elevate results.  Lumbar Spine (L1-L4)      T-score:  0.9, marked degenerative changes present                Left Femoral Neck            T-score:  -2.0               Forearm (radius 33%)      T-score:  -2.2  Treatment recommended    Today's Vitals: /58   Pulse 84   Wt 138 lb 12.8 oz (63 kg)   BMI 21.10 kg/m    ----------------    I spent 30 minutes with this patient today. All changes were made via collaborative practice agreement with Andrez Soares MD. A copy of the visit note was provided to the patient's provider(s).    A summary of these recommendations was given to the patient.    Analilia Lacey, PharmD [facility]  Medication Therapy Management Practitioner   #[phone]       Medication Therapy Recommendations  Diabetes mellitus, type 2 (H)    Current Medication: insulin glargine (LANTUS PEN) 100 UNIT/ML pen   Rationale: Dose too low - Dosage too low - Effectiveness   Recommendation: Increase Dose   Status: Accepted per CPA

## 2023-05-16 ENCOUNTER — OFFICE VISIT (OUTPATIENT)
Dept: PHARMACY | Facility: CLINIC | Age: 73
End: 2023-05-16
Payer: COMMERCIAL

## 2023-05-16 VITALS
DIASTOLIC BLOOD PRESSURE: 58 MMHG | HEART RATE: 84 BPM | SYSTOLIC BLOOD PRESSURE: 100 MMHG | WEIGHT: 138.8 LBS | BODY MASS INDEX: 21.1 KG/M2

## 2023-05-16 DIAGNOSIS — E11.65 TYPE 2 DIABETES MELLITUS WITH HYPERGLYCEMIA, WITHOUT LONG-TERM CURRENT USE OF INSULIN (H): Primary | ICD-10-CM

## 2023-05-16 DIAGNOSIS — I10 HYPERTENSION GOAL BP (BLOOD PRESSURE) < 140/90: ICD-10-CM

## 2023-05-16 DIAGNOSIS — M80.00XD OSTEOPOROSIS WITH CURRENT PATHOLOGICAL FRACTURE WITH ROUTINE HEALING, UNSPECIFIED OSTEOPOROSIS TYPE, SUBSEQUENT ENCOUNTER: ICD-10-CM

## 2023-05-16 DIAGNOSIS — E78.5 HYPERLIPIDEMIA LDL GOAL <100: ICD-10-CM

## 2023-05-16 PROCEDURE — 99607 MTMS BY PHARM ADDL 15 MIN: CPT | Performed by: PHARMACIST

## 2023-05-16 PROCEDURE — 99606 MTMS BY PHARM EST 15 MIN: CPT | Performed by: PHARMACIST

## 2023-06-02 ENCOUNTER — APPOINTMENT (OUTPATIENT)
Dept: LAB | Facility: CLINIC | Age: 73
End: 2023-06-02
Payer: COMMERCIAL

## 2023-06-02 ENCOUNTER — LAB (OUTPATIENT)
Dept: LAB | Facility: CLINIC | Age: 73
End: 2023-06-02
Payer: COMMERCIAL

## 2023-06-02 DIAGNOSIS — H20.011 PRIMARY IRIDOCYCLITIS OF RIGHT EYE: ICD-10-CM

## 2023-06-02 DIAGNOSIS — H20.011 PRIMARY IRIDOCYCLITIS OF RIGHT EYE: Primary | ICD-10-CM

## 2023-06-02 LAB
ERYTHROCYTE [DISTWIDTH] IN BLOOD BY AUTOMATED COUNT: 12.3 % (ref 10–15)
HCT VFR BLD AUTO: 40.1 % (ref 35–47)
HGB BLD-MCNC: 13.5 G/DL (ref 11.7–15.7)
MCH RBC QN AUTO: 31 PG (ref 26.5–33)
MCHC RBC AUTO-ENTMCNC: 33.7 G/DL (ref 31.5–36.5)
MCV RBC AUTO: 92 FL (ref 78–100)
PLATELET # BLD AUTO: 245 10E3/UL (ref 150–450)
RBC # BLD AUTO: 4.35 10E6/UL (ref 3.8–5.2)
WBC # BLD AUTO: 6.8 10E3/UL (ref 4–11)

## 2023-06-02 PROCEDURE — 86618 LYME DISEASE ANTIBODY: CPT

## 2023-06-02 PROCEDURE — 36415 COLL VENOUS BLD VENIPUNCTURE: CPT

## 2023-06-02 PROCEDURE — 86200 CCP ANTIBODY: CPT

## 2023-06-02 PROCEDURE — 86780 TREPONEMA PALLIDUM: CPT

## 2023-06-02 PROCEDURE — 86481 TB AG RESPONSE T-CELL SUSP: CPT

## 2023-06-02 PROCEDURE — 81374 HLA I TYPING 1 ANTIGEN LR: CPT

## 2023-06-02 PROCEDURE — 86038 ANTINUCLEAR ANTIBODIES: CPT

## 2023-06-02 PROCEDURE — 85027 COMPLETE CBC AUTOMATED: CPT

## 2023-06-02 PROCEDURE — 82164 ANGIOTENSIN I ENZYME TEST: CPT | Mod: 90

## 2023-06-02 PROCEDURE — 99000 SPECIMEN HANDLING OFFICE-LAB: CPT

## 2023-06-03 LAB
ACE SERPL-CCNC: 38 U/L
B BURGDOR IGG+IGM SER QL: 0.02
CCP AB SER IA-ACNC: 0.4 U/ML
T PALLIDUM AB SER QL: NONREACTIVE

## 2023-06-05 LAB
ANA SER QL IF: NEGATIVE
GAMMA INTERFERON BACKGROUND BLD IA-ACNC: 0 IU/ML
M TB IFN-G BLD-IMP: NEGATIVE
M TB IFN-G CD4+ BCKGRND COR BLD-ACNC: 8.12 IU/ML
MITOGEN IGNF BCKGRD COR BLD-ACNC: 0.02 IU/ML
MITOGEN IGNF BCKGRD COR BLD-ACNC: 0.06 IU/ML
QUANTIFERON MITOGEN: 8.12 IU/ML
QUANTIFERON NIL TUBE: 0 IU/ML
QUANTIFERON TB1 TUBE: 0.02 IU/ML
QUANTIFERON TB2 TUBE: 0.06

## 2023-06-07 LAB
B LOCUS: NORMAL
B27TEST METHOD: NORMAL

## 2023-06-12 NOTE — PROGRESS NOTES
Medication Therapy Management (MTM) Encounter    ASSESSMENT:                            Medication Adherence/Access: See below for considerations    Type 2 Diabetes: Self monitoring of blood glucose is not at goal of fasting  mg/dL and post prandial < 180 mg/dL. Patient would benefit from Basal Insulin (Lantus) :  increase dose to 24 units.  We discussed counting clicks and/or getting magnifying glass.  Blurred vision could be the change of high blood sugars to lower readings, will need to normalized blood sugars and see if this improves.  Discussed needing rapid acting insulin at meals, she would like to wait on this. Recommend watching carbohydrates.     Hypertension: Patient is not meeting blood pressure goal of < 130/80mmHg but close, continue to monitor     Hyperlipidemia: stable, she wants to continuing every other day dosing, recheck lipid panel in 2-3 months    Eye inflammation of left eye: recommend she continue to work with opthamologist    PLAN:                            Diabetes:   -Increase the Lantus to 24 units once daily, count clicks, if the blood sugars are >130 mg/dl increase to 26 units  -Watch your carbohydrates, aim for 45 grams at meals and 15 grams for snacks.  -You may need a rapid acting insulin such as Novolog (insulin aspart) or Humalog (insulin lispro)  -Goals are 80-130mg/dL before meals and <180mg/dL 2 hours after meals    Future consideration for Dr. Soares: starting bisphosphonate, she had not previously wanted to start    Follow-up: Dr. Soares 7/17/23 Return in about 2 months (around 8/15/2023) for MTM Pharmacist Visit, clinic visit.    SUBJECTIVE/OBJECTIVE:                          Jennifer Presley is a 72 year old female coming in for a follow-up visit.  Today's visit is a follow-up MTM visit from 5/16/23     Reason for visit: blurred vision.    Allergies/ADRs: Reviewed in chart  Past Medical History: Reviewed in chart  Tobacco: She reports that she has never smoked. She has  "never used smokeless tobacco.  Alcohol: 1-2 beverages / day  Caffeine: tea 5-6 cups per day  Activity: not much    Medication Adherence/Access: Patient uses pill box(es).  Patient takes medications 1 time(s) per day.   Per patient, misses medication a few times per week on insulin.   Medication barriers: fears/concerns about: does not like to take medications.   The patient fills medications at [place]: NO, [facility]    Eye inflammation of left eye: Saw ophthalmologist and clearing up, vision is blurry in left eye, can not read label or book.  Has another appointment and taking prednisolone 1% drops in right eye four times a day for eye issues.    Type 2 Diabetes:  Currently taking Lantus 20-22 units every day for about the last week.  She can not read the insulin pen needle and guessing on what she is taking.  Side effects: blurred vision  Last night cheeseburger and french fries-[employer], often gets [employer], she is snacking and wonder if this is why her evening blood sugars are too high  Per chart review:  Stopped lantus because it was too expensive:  Metformin cause diarrhea.  Actos \"didn't work.\"  Insulin is too expensive  Glimepiride: not sure why this wasn't continued.  Blood sugar monitoring: Hard to check 2 hours due to snacks throughout the day.  Does not want continuous glucose monitor   Date FBG/ 2hours post Lunch/2hours post Dinner /2hours post    6/14 219      6/13 159, 174  /399    6/13   /392    6/12 176, 178      6/12 170      6/11 183  /365    6/10 219  /323        Date FBG/ 2hours post Lunch/2hours post Bedtime /2hours post    5/16 320      5/15 286  422    5/14 227  354    5/13 255  327    5/12   410    5/11 247      5/10   342        Symptoms of low blood sugar? none  Symptoms of high blood sugar? Blurred vision  Eye exam: due  Foot exam: due  Aspirin: Taking 1/2 tablet 325mg daily for primary prevention   The 10-year ASCVD risk score (Marcelino ROONEY, et al., 2019) is: " 17.7%    Values used to calculate the score:      Age: 72 years      Sex: Female      Is Non- : No      Diabetic: Yes      Tobacco smoker: No      Systolic Blood Pressure: 105 mmHg      Is BP treated: Yes      HDL Cholesterol: 58 mg/dL      Total Cholesterol: 202 mg/dL    Statin: Yes   ACEi/ARB: Yes.   Urine Albumin:   Lab Results   Component Value Date    UMALCR  03/27/2023      Comment:      Unable to calculate, urine albumin and/or urine creatinine is outside detectable limits.  Microalbuminuria is defined as an albumin:creatinine ratio of 17 to 299 for males and 25 to 299 for females. A ratio of albumin:creatinine of 300 or higher is indicative of overt proteinuria.  Due to biologic variability, positive results should be confirmed by a second, first-morning random or 24-hour timed urine specimen. If there is discrepancy, a third specimen is recommended. When 2 out of 3 results are in the microalbuminuria range, this is evidence for incipient nephropathy and warrants increased efforts at glucose control, blood pressure control, and institution of therapy with an angiotensin-converting-enzyme (ACE) inhibitor (if the patient can tolerate it).        Lab Results   Component Value Date    A1C 12.9 03/27/2023    A1C 13.2 01/30/2023    A1C 14.5 04/29/2019    A1C 11.7 09/21/2015    A1C 10.6 06/10/2015    A1C 9.5 12/17/2013    A1C 8.5 09/24/2013     Wt Readings from Last 4 Encounters:   06/14/23 137 lb 4.8 oz (62.3 kg)   05/16/23 138 lb 12.8 oz (63 kg)   05/02/23 134 lb 6.4 oz (61 kg)   03/27/23 134 lb (60.8 kg)     Hypertension: Current medications include none, she stopped lisinopril 5mg every day every other day. She feels better of the lisinopril, not as dizzy  Patient does not self-monitor blood pressure.  Patient reports the following medication side effects: none    Lab Results   Component Value Date    UCRR 39.7 03/27/2023    MICROL <12.0 03/27/2023    UMALCR  03/27/2023      Comment:       Unable to calculate, urine albumin and/or urine creatinine is outside detectable limits.  Microalbuminuria is defined as an albumin:creatinine ratio of 17 to 299 for males and 25 to 299 for females. A ratio of albumin:creatinine of 300 or higher is indicative of overt proteinuria.  Due to biologic variability, positive results should be confirmed by a second, first-morning random or 24-hour timed urine specimen. If there is discrepancy, a third specimen is recommended. When 2 out of 3 results are in the microalbuminuria range, this is evidence for incipient nephropathy and warrants increased efforts at glucose control, blood pressure control, and institution of therapy with an angiotensin-converting-enzyme (ACE) inhibitor (if the patient can tolerate it).       Hyperlipidemia: Current therapy includes atorvastatin 20mg every day (started end of March) every other day.  Patient reports no significant myalgias or other side effects.  Recent Labs   Lab Test 01/31/23  0552 04/29/19  0936 09/21/15  0804 04/22/15  0937   CHOL 202* 256* 209* 189   HDL 58 44* 32* 35*   * 171* 129 121   TRIG 68 206* 239* 167*   CHOLHDLRATIO  --   --  6.5* 5.4*     Today's Vitals: /86   Pulse 87   Wt 137 lb 4.8 oz (62.3 kg)   SpO2 100%   BMI 20.88 kg/m    ----------------    I spent 30 minutes with this patient today. All changes were made via collaborative practice agreement with Andrez Soares MD. A copy of the visit note was provided to the patient's provider(s).    A summary of these recommendations was given to the patient.    Analilia Lacey, PharmD [facility]  Medication Therapy Management Practitioner   #[phone]     Medication Therapy Recommendations  Diabetes mellitus, type 2 (H)    Current Medication: insulin glargine (LANTUS PEN) 100 UNIT/ML pen   Rationale: Dose too low - Dosage too low - Effectiveness   Recommendation: Increase Dose   Status: Accepted per CPA

## 2023-06-14 ENCOUNTER — OFFICE VISIT (OUTPATIENT)
Dept: PHARMACY | Facility: CLINIC | Age: 73
End: 2023-06-14
Payer: COMMERCIAL

## 2023-06-14 VITALS
OXYGEN SATURATION: 100 % | SYSTOLIC BLOOD PRESSURE: 122 MMHG | HEART RATE: 87 BPM | DIASTOLIC BLOOD PRESSURE: 86 MMHG | BODY MASS INDEX: 20.88 KG/M2 | WEIGHT: 137.3 LBS

## 2023-06-14 DIAGNOSIS — I10 HYPERTENSION GOAL BP (BLOOD PRESSURE) < 140/90: Primary | ICD-10-CM

## 2023-06-14 DIAGNOSIS — E78.5 HYPERLIPIDEMIA LDL GOAL <100: ICD-10-CM

## 2023-06-14 DIAGNOSIS — E11.65 TYPE 2 DIABETES MELLITUS WITH HYPERGLYCEMIA, WITHOUT LONG-TERM CURRENT USE OF INSULIN (H): ICD-10-CM

## 2023-06-14 PROCEDURE — 99606 MTMS BY PHARM EST 15 MIN: CPT | Performed by: PHARMACIST

## 2023-06-14 PROCEDURE — 99607 MTMS BY PHARM ADDL 15 MIN: CPT | Performed by: PHARMACIST

## 2023-06-14 RX ORDER — PREDNISOLONE ACETATE 10 MG/ML
1 SUSPENSION/ DROPS OPHTHALMIC 4 TIMES DAILY
COMMUNITY
End: 2024-01-02

## 2023-06-14 NOTE — PATIENT INSTRUCTIONS
Recommendations from today's MTM visit:                                                      Diabetes:   -Increase the Lantus to 24 units once daily, count clicks, if the blood sugars are >130 mg/dl increase to 26 units  -Watch your carbohydrates, aim for 45 grams at meals and 15 grams for snacks.  -You may need a rapid acting insulin such as Novolog (insulin aspart) or Humalog (insulin lispro)  -Goals are 80-130mg/dL before meals and <180mg/dL 2 hours after meals    Follow-up: Dr. Soares 7/17/23 Return in about 2 months (around 8/15/2023) for MTM Pharmacist Visit, clinic visit.    To schedule another MTM appointment, please call the clinic directly or you may call the MTM scheduling line at [phone] or toll-free at [phone].     My Clinical Pharmacist's contact information:                                                      It was a pleasure seeing you today!  Please feel free to contact me with any questions or concerns you have.      Analilia Lacey, PharmD [facility]  Medication Therapy Management Practitioner   #[phone]    It was great to speak with you today.  I value your experience and would be very thankful for your time with providing feedback on our clinic survey. You may receive a survey via email or text message in the next few days.

## 2023-06-17 ENCOUNTER — TRANSFERRED RECORDS (OUTPATIENT)
Dept: MULTI SPECIALTY CLINIC | Facility: CLINIC | Age: 73
End: 2023-06-17

## 2023-06-17 LAB — RETINOPATHY: NORMAL

## 2023-07-17 ENCOUNTER — OFFICE VISIT (OUTPATIENT)
Dept: PEDIATRICS | Facility: CLINIC | Age: 73
End: 2023-07-17
Payer: COMMERCIAL

## 2023-07-17 VITALS
TEMPERATURE: 97.2 F | WEIGHT: 140.8 LBS | HEART RATE: 78 BPM | HEIGHT: 68 IN | SYSTOLIC BLOOD PRESSURE: 116 MMHG | RESPIRATION RATE: 16 BRPM | OXYGEN SATURATION: 100 % | DIASTOLIC BLOOD PRESSURE: 72 MMHG | BODY MASS INDEX: 21.34 KG/M2

## 2023-07-17 DIAGNOSIS — M81.0 OSTEOPOROSIS, UNSPECIFIED OSTEOPOROSIS TYPE, UNSPECIFIED PATHOLOGICAL FRACTURE PRESENCE: ICD-10-CM

## 2023-07-17 DIAGNOSIS — E11.65 TYPE 2 DIABETES MELLITUS WITH HYPERGLYCEMIA, WITHOUT LONG-TERM CURRENT USE OF INSULIN (H): Primary | ICD-10-CM

## 2023-07-17 DIAGNOSIS — Z12.31 VISIT FOR SCREENING MAMMOGRAM: ICD-10-CM

## 2023-07-17 DIAGNOSIS — I10 HYPERTENSION GOAL BP (BLOOD PRESSURE) < 140/90: ICD-10-CM

## 2023-07-17 DIAGNOSIS — Z12.11 SCREEN FOR COLON CANCER: ICD-10-CM

## 2023-07-17 DIAGNOSIS — Z23 NEED FOR SHINGLES VACCINE: ICD-10-CM

## 2023-07-17 PROCEDURE — 99214 OFFICE O/P EST MOD 30 MIN: CPT | Performed by: INTERNAL MEDICINE

## 2023-07-17 RX ORDER — ALENDRONATE SODIUM 70 MG/1
70 TABLET ORAL
Qty: 12 TABLET | Refills: 4 | Status: SHIPPED | OUTPATIENT
Start: 2023-07-17 | End: 2024-01-02

## 2023-07-17 ASSESSMENT — PAIN SCALES - GENERAL: PAINLEVEL: NO PAIN (0)

## 2023-07-17 NOTE — PATIENT INSTRUCTIONS
May stay on the 20 units of lantus if you'd like, but I would prefer you up to 25 units.  Follow up with Analilia in August.    Hip pain after a fracture is normal; let us know if worsening.    Your DEXA scan (bone density test for osteoporosis) looks like you have ostoporosis.  I would recommmend once weekly fosamax.   I would take calcium / vitamin D twice daily.    Improved diabetes mellitus care will improve your neuropathy.    We'll monitor your shakiness for now; no evidence of Parkinson's.    Andrez Soares MD  Internal Medicine and Pediatrics

## 2023-07-17 NOTE — PROGRESS NOTES
Assessment & Plan     Need for shingles vaccine  Refuses.     Visit for screening mammogram  refuses    Type 2 diabetes mellitus with hyperglycemia, without long-term current use of insulin (H)  Poor control.  Very hesistant about dose increases.  Agrees to try 25 units lantus.   - Adult Eye  Referral; Future  - insulin pen needle (32G X 4 MM) 32G X 4 MM miscellaneous; Use 1 pen needles daily or as directed.    Screen for colon cancer  Refuses.     Hypertension goal BP (blood pressure) < 140/90  At goal.    Osteoporosis, unspecified osteoporosis type, unspecified pathological fracture presence   agrees to begin fosamax.  Discussed how ongoing mild hip pain after her hip fracture can occur.    - alendronate (FOSAMAX) 70 MG tablet; Take 1 tablet (70 mg) by mouth every 7 days           Blood sugar testing frequency justification:  Uncontrolled diabetes  Patient Instructions   May stay on the 20 units of lantus if you'd like, but I would prefer you up to 25 units.  Follow up with Analilia in August.    Hip pain after a fracture is normal; let us know if worsening.    Your DEXA scan (bone density test for osteoporosis) looks like you have ostoporosis.  I would recommmend once weekly fosamax.   I would take calcium / vitamin D twice daily.    Improved diabetes mellitus care will improve your neuropathy.    We'll monitor your shakiness for now; no evidence of Parkinson's.    Andrez Soares MD  Internal Medicine and Pediatrics             Andrez Soares MD  [facility] JANAY Rodriguez is a 72 year old, presenting for the following health issues:  RECHECK (Hip surgery follow up)        7/17/2023    10:17 AM   Additional Questions   Roomed by JOCELYN IRVING   Accompanied by N/A         7/17/2023    10:17 AM   Patient Reported Additional Medications   Patient reports taking the following new medications N/A     History of Present Illness       Diabetes:   She presents for follow up of diabetes.   She is checking home blood glucose two times daily. She checks blood glucose before meals and at bedtime.  Blood glucose is sometimes over 200 and never under 70. When her blood glucose is low, the patient is asymptomatic for confusion, blurred vision, lethargy and reports not feeling dizzy, shaky, or weak.  She has no concerns regarding her diabetes at this time.  She is having numbness in feet. The patient has had a diabetic eye exam in the last 12 months. Eye exam performed on june 17. Location of last eye exam [facility] eye [facility].        Reason for visit:  Surgery followup    She eats 0-1 servings of fruits and vegetables daily.She consumes 0 sweetened beverage(s) daily.She exercises with enough effort to increase her heart rate 9 or less minutes per day.  She exercises with enough effort to increase her heart rate 3 or less days per week. She is missing 2 dose(s) of medications per week.  She is not taking prescribed medications regularly due to remembering to take.     diabetes mellitus;  Has been on lantus insulin; taking 20 units right now, daily.  Has been on for last 1 months     Sugars in AM running 100-200, usually about 150.    Evening running at 250s.      Has been told to increase the lantus but is hesitant.   Has MTM appointment in 1 month.     Recent DEXA scan (bone density test for osteoporosis) shows osteoporosis.  Had hip surgery.  Still afraid of falling. Still with some hip pain.  Is still moving and walking.  Pain is not all the time; worse trying to get out of car.       Some shakiness to hands.             Review of Systems   CONSTITUTIONAL: NEGATIVE for fever, chills, change in weight  INTEGUMENTARY/SKIN: NEGATIVE for worrisome rashes, moles or lesions  EYES: NEGATIVE for vision changes or irritation  ENT/MOUTH: NEGATIVE for ear, mouth and throat problems  RESP: NEGATIVE for significant cough or SOB  BREAST: NEGATIVE for masses, tenderness or discharge  CV: NEGATIVE for chest pain,  "palpitations or peripheral edema  GI: NEGATIVE for nausea, abdominal pain, heartburn, or change in bowel habits  : NEGATIVE for frequency, dysuria, or hematuria  MUSCULOSKELETAL: NEGATIVE for significant arthralgias or myalgia  NEURO: ongoing peripheral neuropathy.   ENDOCRINE: NEGATIVE for temperature intolerance, skin/hair changes  HEME: NEGATIVE for bleeding problems  PSYCHIATRIC: NEGATIVE for changes in mood or affect      Objective    /72 (BP Location: Right arm, Patient Position: Right side, Cuff Size: Adult Regular)   Pulse 78   Temp 97.2  F (36.2  C) (Temporal)   Resp 16   Ht 1.721 m (5' 7.76\")   Wt 63.9 kg (140 lb 12.8 oz)   SpO2 100%   BMI 21.56 kg/m    Body mass index is 21.56 kg/m .  Physical Exam   GENERAL: healthy, alert and no distress  EYES: Eyes grossly normal to inspection, PERRL and conjunctivae and sclerae normal  HENT: ear canals and TM's normal, nose and mouth without ulcers or lesions  NECK: no adenopathy, no asymmetry, masses, or scars and thyroid normal to palpation  RESP: lungs clear to auscultation - no rales, rhonchi or wheezes  BREAST: normal without masses, tenderness or nipple discharge and no palpable axillary masses or adenopathy  CV: regular rate and rhythm, normal S1 S2, no S3 or S4, no murmur, click or rub, no peripheral edema and peripheral pulses strong  ABDOMEN: soft, nontender, no hepatosplenomegaly, no masses and bowel sounds normal  MS: no gross musculoskeletal defects noted, no edema  SKIN: no suspicious lesions or rashes  NEURO: Normal strength and tone, mentation intact and speech normal  PSYCH: mentation appears normal, affect normal/bright                    "

## 2023-08-15 ENCOUNTER — TELEPHONE (OUTPATIENT)
Dept: PHARMACY | Facility: CLINIC | Age: 73
End: 2023-08-15
Payer: COMMERCIAL

## 2023-09-26 DIAGNOSIS — E11.65 TYPE 2 DIABETES MELLITUS WITH HYPERGLYCEMIA, WITHOUT LONG-TERM CURRENT USE OF INSULIN (H): ICD-10-CM

## 2023-09-27 ENCOUNTER — TELEPHONE (OUTPATIENT)
Dept: PHARMACY | Facility: CLINIC | Age: 73
End: 2023-09-27
Payer: COMMERCIAL

## 2023-09-27 NOTE — TELEPHONE ENCOUNTER
Routing refill request to provider for review/approval because:  Labs out of range:  HgbA1C  Labs not current:  HgbA1C  Jamee Riley RN, BSN  [facility]

## 2023-09-27 NOTE — TELEPHONE ENCOUNTER
This patient is due for MT follow-up. I called the patient to schedule an appointment.     Left message.    Analilia Lacey PharmD [facility]  Medication Therapy Management Practitioner  Voicemail #[phone]

## 2023-10-12 ENCOUNTER — OFFICE VISIT (OUTPATIENT)
Dept: PEDIATRICS | Facility: CLINIC | Age: 73
End: 2023-10-12
Payer: COMMERCIAL

## 2023-10-12 VITALS
BODY MASS INDEX: 22.37 KG/M2 | TEMPERATURE: 98.3 F | SYSTOLIC BLOOD PRESSURE: 127 MMHG | WEIGHT: 146.1 LBS | DIASTOLIC BLOOD PRESSURE: 74 MMHG | RESPIRATION RATE: 12 BRPM | HEART RATE: 89 BPM | OXYGEN SATURATION: 100 %

## 2023-10-12 DIAGNOSIS — B02.9 HERPES ZOSTER WITHOUT COMPLICATION: Primary | ICD-10-CM

## 2023-10-12 PROCEDURE — 99213 OFFICE O/P EST LOW 20 MIN: CPT | Performed by: PHYSICIAN ASSISTANT

## 2023-10-12 RX ORDER — VALACYCLOVIR HYDROCHLORIDE 1 G/1
1000 TABLET, FILM COATED ORAL 3 TIMES DAILY
Qty: 21 TABLET | Refills: 0 | Status: SHIPPED | OUTPATIENT
Start: 2023-10-12 | End: 2023-10-19

## 2023-10-12 RX ORDER — RESPIRATORY SYNCYTIAL VIRUS VACCINE 120MCG/0.5
0.5 KIT INTRAMUSCULAR ONCE
Qty: 1 EACH | Refills: 0 | Status: CANCELLED | OUTPATIENT
Start: 2023-10-12 | End: 2023-10-12

## 2023-10-12 RX ORDER — OFLOXACIN 3 MG/ML
SOLUTION/ DROPS OPHTHALMIC
COMMUNITY
Start: 2023-05-17 | End: 2024-01-02

## 2023-10-12 RX ORDER — ATROPINE SULFATE 10 MG/ML
SOLUTION/ DROPS OPHTHALMIC
COMMUNITY
Start: 2023-05-19 | End: 2024-01-02

## 2023-10-12 NOTE — COMMUNITY RESOURCES LIST (ENGLISH)
10/12/2023   [facility]  [facility]A  For questions about this resource list or additional care needs, please contact your primary care clinic or care manager.  Phone: [phone]   Email: N/A   Address: [address] [place] [address]   Hours: N/A        Financial Stability       Utility payment assistance  1  [address] [place] [address] Distance: 3.03 miles      In-Person, Phone/Virtual   [address] [place] [address]  Language: English, Lao  Hours: Mon - Thu 9:00 AM - 4:00 PM  Fees: Free   Phone: (180) 199-5598 Email: info@[facility] HospitalDocLanding.org Website: https://[facility] Army Community HospitalObservable Networks.Localyte.com/     2  [facility] Distance: 4.55 miles      In-Person, Phone/Virtual   [address] [place] [address]  Language: English  Hours: Mon 4:00 PM - 6:00 PM , Tue 11:00 AM - 1:00 PM , Wed 4:00 PM - 6:00 PM , Thu 10:30 AM - 12:30 PM  Fees: Free   Phone: (995) 398-1744 Email: resources@Fatsoma.org Website: https://popmn.org/mission/mission-outpost/          Transportation       Free or low-cost transportation  3  UniPay. Distance: 4.46 miles      In-Person   [address] [facility] [address] [place] [address]  Language: English  Hours: Mon - Fri 7:00 AM - 5:00 PM  Fees: Free   Phone: (221) 459-8621 Email: [email or web address] [email or web address].Anaergia Website: https://MaintenanceNet.Anaergia/     4  [employer] - [facility] Circulator Bus Distance: 9.41 miles      In-Person   [address] [place] [address]  Language: English  Hours: Tue 9:00 AM - 2:00 PM  Fees: Self Pay   Phone: (582) 530-1823 Email: info@Truevision.Localyte.com Website: http://www.GreenFuelconnePrism Microwave.org/     Transportation to medical appointments  5  [place] Mobility Distance: 1.85 miles      In-Person   [address] [place] [address]  Language: Turkish, English, Oromo, Kittitian  Hours: Mon - Sat 5:00 AM - 9:00 PM  Fees: Insurance, Self Pay   Phone: (149) [phone]  Email: info@[employer] Website: http://www.[employer]/     6  Assisted Transport Distance: 4.73 miles      In-Person   [address] [place] [address]  Language: English, Sinhala  Hours: Mon - Sun Appt. Only  Fees: Self Pay   Phone: (415) 411-8676 Email: lupe@NSL Renewable Power Website: http://www.NSL Renewable Power/          Important Numbers & Websites       Emergency Services   911  [facility]   311  Poison Control   (930) 712-6794  Suicide Prevention Lifeline   (540) 632-6761 (TALK)  Child Abuse Hotline   (662) 659-2719 (4-A-Child)  Sexual Assault Hotline   (701) 231-5104 (HOPE)  National Runaway Safeline   (538) 622-1248 (RUNAWAY)  All-Options Talkline   (614) 513-8462  Substance Abuse Referral   (145) 680-3279 (HELP)

## 2023-10-12 NOTE — PROGRESS NOTES
Assessment & Plan     Herpes zoster without complication    - valACYclovir (VALTREX) 1000 mg tablet; Take 1 tablet (1,000 mg) by mouth 3 times daily for 7 days      Patient was seen today for a classic presentation of shingles.  She will start Valtrex for treatment.  We discussed that pain medication such as gabapentin can be considered if the pain continues, but she is not interested in this now.  Patient will monitor her symptoms and followup if they change or worsen in any way.             REYES Garcia [facility] JANAY Rodriguez is a 73 year old, presenting for the following health issues:  Shingles          10/12/2023     2:14 PM   Additional Questions   Roomed by Jennifer VF   Accompanied by n/a         10/12/2023     2:14 PM   Patient Reported Additional Medications   Patient reports taking the following new medications n/a       History of Present Illness       Reason for visit:  Suspect shingles on my back  Symptom onset:  Today  Symptom intensity:  Moderate  Had these symptoms before:  No    She eats 0-1 servings of fruits and vegetables daily.She consumes 0 sweetened beverage(s) daily.She exercises with enough effort to increase her heart rate 9 or less minutes per day.  She exercises with enough effort to increase her heart rate 3 or less days per week.   She is taking medications regularly.       Patient woke with a sore back this morning and saw that she appears to have shingles.  She checked out the [place] website and decided that she needs to have this looked at.  She states that she also overall feels a little under the weather.  She is not having fevers.         Review of Systems   Constitutional, HEENT, cardiovascular, pulmonary, gi and gu systems are negative, except as otherwise noted.      Objective    /74 (BP Location: Right arm, Patient Position: Sitting, Cuff Size: Adult Regular)   Pulse 89   Temp 98.3  F (36.8  C) (Oral)   Resp 12   Wt  66.3 kg (146 lb 1.6 oz)   SpO2 100%   BMI 22.37 kg/m    Body mass index is 22.37 kg/m .  Physical Exam   GENERAL: healthy, alert and no distress  EYES: Eyes grossly normal to inspection, PERRL and conjunctivae and sclerae normal  RESP: lungs clear to auscultation - no rales, rhonchi or wheezes  CV: regular rate and rhythm, normal S1 S2, no S3 or S4, no murmur, click or rub, no peripheral edema and peripheral pulses strong  MS: no gross musculoskeletal defects noted, no edema  SKIN: Vesicular rash on the right side mid back following dermatome pattern and not crossing the midline.  No sign of infection      Maris Boyer PA-C

## 2023-10-12 NOTE — COMMUNITY RESOURCES LIST (ENGLISH)
10/12/2023   [facility]  [facility]  For questions about this resource list or additional care needs, please contact your primary care clinic or care manager.  Phone: [phone]   Email: N/A   Address: [address] [place] [address]   Hours: N/A        Financial Stability       Utility payment assistance  1  [address] [place] [address] Distance: 3.03 miles      In-Person, Phone/Virtual   [address] [place] [address]  Language: English, Hungarian  Hours: Mon - Thu 9:00 AM - 4:00 PM  Fees: Free   Phone: (703) 618-9748 Email: info@[facility] CenterSaltStack.org Website: https://[facility] Medical CenterUEIS.MisAbogados.com/     2  [facility] Distance: 4.55 miles      In-Person, Phone/Virtual   [address] [place] [address]  Language: English  Hours: Mon 4:00 PM - 6:00 PM , Tue 11:00 AM - 1:00 PM , Wed 4:00 PM - 6:00 PM , Thu 10:30 AM - 12:30 PM  Fees: Free   Phone: (776) 957-8013 Email: resources@Electric Cloud.org Website: https://popmn.org/mission/mission-outpost/          Transportation       Free or low-cost transportation  3  ThoughtLeadr. Distance: 4.46 miles      In-Person   [address] [facility] [address] [place] [address]  Language: English  Hours: Mon - Fri 7:00 AM - 5:00 PM  Fees: Free   Phone: (229) 505-8211 Email: [email or web address]@[email or web address].Funzio Website: https://Convertro.Funzio/     4  DARTargetingMantra - [facility] Circulator Bus Distance: 9.41 miles      In-Person   [address] [place] [address]  Language: English  Hours: Tue 9:00 AM - 2:00 PM  Fees: Self Pay   Phone: (897) 938-5891 Email: info@Medical Solutions.MisAbogados.com Website: http://www.Fifth Generation Technologies India PrivateconneQuIC Financial Technologies.org/     Transportation to medical appointments  5  [place] Mobility Distance: 1.85 miles      In-Person   [address] [place] [address]  Language: Lithuanian, English, Oromo, Djiboutian  Hours: Mon - Sat 5:00 AM - 9:00 PM  Fees: Insurance, Self Pay   Phone: (157) [phone]  Email: info@[employer] Website: http://www.Othera Pharmaceuticals/     6  Assisted Transport Distance: 4.73 miles      In-Person   [address] [facility] [address]  Language: English, Sinhala  Hours: Mon - Sun Appt. Only  Fees: Self Pay   Phone: (983) 863-2370 Email: lupe@Population Diagnostics Website: http://www.Population Diagnostics/          Important Numbers & Websites       Emergency Services   911  [facility]   311  Poison Control   (276) 618-6119  Suicide Prevention Lifeline   (121) 441-7830 (TALK)  Child Abuse Hotline   (729) 729-2987 (4-A-Child)  Sexual Assault Hotline   (920) 536-1618 (HOPE)  National Runaway Safeline   (241) 992-2268 (RUNAWAY)  All-Options Talkline   (313) 491-4606  Substance Abuse Referral   (232) 800-2784 (HELP)

## 2023-10-12 NOTE — COMMUNITY RESOURCES LIST (ENGLISH)
10/12/2023   [facility]  N[facility]A  For questions about this resource list or additional care needs, please contact your primary care clinic or care manager.  Phone: [phone]   Email: N/A   Address: [address] [place] [address]   Hours: N/A        Financial Stability       Utility payment assistance  1  [address] [place] [address] Distance: 3.03 miles      In-Person, Phone/Virtual   [address] [place] [address]  Language: English, German  Hours: Mon - Thu 9:00 AM - 4:00 PM  Fees: Free   Phone: (772) 448-6291 Email: info@[facility] CenterThinkCERCA.org Website: https://[facility] Missouri Medical CenterAsync Technologies.Network Foundation Technologies/     2  [facility] [place] [facility] Distance: 4.55 miles      In-Person, Phone/Virtual   [address] [place] [address]  Language: English  Hours: Mon 4:00 PM - 6:00 PM , Tue 11:00 AM - 1:00 PM , Wed 4:00 PM - 6:00 PM , Thu 10:30 AM - 12:30 PM  Fees: Free   Phone: (413) 673-9381 Email: resources@TransMedia Communications SARL.org Website: https://popmn.org/mission/mission-outpost/          Transportation       Free or low-cost transportation  3  Kaola100. Distance: 4.46 miles      In-Person   [address] [facility] [address] [place] [address]  Language: English  Hours: Mon - Fri 7:00 AM - 5:00 PM  Fees: Free   Phone: (897) 647-1793 Email: kppzqujwzjxv25@August.[email or web address] Connections Website: https://Metaresolver.M2 Connections/     4  DARCleo - [facility] Circulator Bus Distance: 9.41 miles      In-Person   [address] [place] [address]  Language: English  Hours: Tue 9:00 AM - 2:00 PM  Fees: Self Pay   Phone: (958) 524-8545 Email: info@Cellcrypt.Network Foundation Technologies Website: http://www.EZbuildingEHSconneAwarepoint.org/     Transportation to medical appointments  5  [place] Mobility Distance: 1.85 miles      In-Person   [address] [place] [address]  Language: Danish, English, Oromo, Palauan  Hours: Mon - Sat 5:00 AM - 9:00 PM  Fees: Insurance, Self Pay   Phone: (976) 828-1122  Email: info@iTwixie Website: http://www.iTwixie/     6  Assisted Transport Distance: 4.73 miles      In-Person   [address] [facility] [place] [address]  Language: English, Telugu  Hours: Mon - Sun Appt. Only  Fees: Self Pay   Phone: (967) 638-2184 Email: lupe@InTown Website: http://www.InTown/          Important Numbers & Websites       Emergency Services   911  [facility]   311  Poison Control   (176) 844-8469  Suicide Prevention Lifeline   (844) 410-3966 (TALK)  Child Abuse Hotline   (285) 159-8170 (4-A-Child)  Sexual Assault Hotline   (222) 495-6360 (HOPE)  National Runaway Safeline   (616) 487-2389 (RUNAWAY)  All-Options Talkline   (321) 528-7369  Substance Abuse Referral   (162) 292-1854 (HELP)

## 2023-10-12 NOTE — COMMUNITY RESOURCES LIST (ENGLISH)
10/12/2023   [facility]  [facility]  For questions about this resource list or additional care needs, please contact your primary care clinic or care manager.  Phone: [phone]   Email: N/A   Address: [address] [place] [address]   Hours: N/A        Financial Stability       Utility payment assistance  1  [address] [place] [address] Distance: 3.03 miles      In-Person, Phone/Virtual   [address] [place] [address]  Language: English, Hebrew  Hours: Mon - Thu 9:00 AM - 4:00 PM  Fees: Free   Phone: (908) 978-4519 Email: info@[facility] Luke's HospitaliProf Learning Solutions.org Website: https://[facility] Luke's HospitalPerlegen Sciences.Filement/     2  [facility] Out[facility] - [facility] Distance: 4.55 miles      In-Person, Phone/Virtual   [address] [place] [address]  Language: English  Hours: Mon 4:00 PM - 6:00 PM , Tue 11:00 AM - 1:00 PM , Wed 4:00 PM - 6:00 PM , Thu 10:30 AM - 12:30 PM  Fees: Free   Phone: (931) 471-3131 Email: resources@LocaMap.org Website: https://popmn.org/mission/mission-outpost/          Transportation       Free or low-cost transportation  3  M-Changa. Distance: 4.46 miles      In-Person   [address] [facility] [address] [place] [address]  Language: English  Hours: Mon - Fri 7:00 AM - 5:00 PM  Fees: Free   Phone: (871) 264-5284 Email: itzbaqhxycnq57@Integrity Digital Solutions.Zyme Solutions Website: https://AirClic.Zyme Solutions/     4  DARStyleTech - The [facility] Circulator Bus Distance: 9.41 miles      In-Person   [address] [place] [address]  Language: English  Hours: Tue 9:00 AM - 2:00 PM  Fees: Self Pay   Phone: (292) 612-7508 Email: info@China Horizon Investments.Filement Website: http://www.VericeptconneEnStorage.org/     Transportation to medical appointments  5  [place] Mobility Distance: 1.85 miles      In-Person   [address] [place] [address]  Language: Kyrgyz, English, Oromo, St Helenian  Hours: Mon - Sat 5:00 AM - 9:00 PM  Fees: Insurance, Self Pay   Phone: (214) 227-4079  Email: info@P3 New Media Website: http://www.P3 New Media/     6  Assisted Transport Distance: 4.73 miles      In-Person   [address] [facility] [place] [address]  Language: English, Azeri  Hours: Mon - Sun Appt. Only  Fees: Self Pay   Phone: (313) 780-5453 Email: lupe@ClusterSeven Website: http://www.ClusterSeven/          Important Numbers & Websites       Emergency Services   911  [facility]   311  Poison Control   (900) 554-9463  Suicide Prevention Lifeline   (875) 608-2495 (TALK)  Child Abuse Hotline   (816) 686-7589 (4-A-Child)  Sexual Assault Hotline   (810) 583-2359 (HOPE)  National Runaway Safeline   (762) 291-5903 (RUNAWAY)  All-Options Talkline   (790) 365-4368  Substance Abuse Referral   (673) 643-1679 (HELP)

## 2023-10-14 NOTE — COMMUNITY RESOURCES LIST (ENGLISH)
10/14/2023   [facility]  [facility]  For questions about this resource list or additional care needs, please contact your primary care clinic or care manager.  Phone: [phone]   Email: N/A   Address: [address] [place] [address]   Hours: N/A        Financial Stability       Utility payment assistance  1  [address] [place] [address] Distance: 3.03 miles      In-Person, Phone/Virtual   [address] [place] [address]  Language: English, Japanese  Hours: Mon - Thu 9:00 AM - 4:00 PM  Fees: Free   Phone: (777) 894-7017 Email: info@Tenet St. LouisAligo.org Website: https://Tenet St. LouisTalkspace.Expert Medical Navigation/     2  [facility] Distance: 4.55 miles      In-Person, Phone/Virtual   [address] [place] [address]  Language: English  Hours: Mon 4:00 PM - 6:00 PM , Tue 11:00 AM - 1:00 PM , Wed 4:00 PM - 6:00 PM , Thu 10:30 AM - 12:30 PM  Fees: Free   Phone: (806) 922-7623 Email: resources@Green Generation Solutions.org Website: https://popmn.org/mission/mission-outpost/          Transportation       Free or low-cost transportation  3  Genomera. Distance: 4.46 miles      In-Person   [address] [facility] [address] [place] [address]  Language: English  Hours: Mon - Fri 7:00 AM - 5:00 PM  Fees: Free   Phone: (674) 328-5154 Email: [email or web address]@ApplyKit.Weddington Way Website: https://Fashion One.[address]/     4  DARProPlan - The [facility] Circulator Bus Distance: 9.41 miles      In-Person   [address] [place] [address]  Language: English  Hours: Tue 9:00 AM - 2:00 PM  Fees: Self Pay   Phone: (669) 701-6667 Email: info@Right90.Expert Medical Navigation Website: http://www.SocialPicksconneStupil.org/     Transportation to medical appointments  5  [place] Mobility Distance: 1.85 miles      In-Person   [address] [place] [address]  Language: Greenlandic, English, Oromo, Bhutanese  Hours: Mon - Sat 5:00 AM - 9:00 PM  Fees: Insurance, Self Pay   Phone: (392) 451-7294  Email: info@Partly Marketplace Website: http://www.Partly Marketplace/     6  Assisted Transport Distance: 4.73 miles      In-Person   [address] [place] [address]  Language: English, Latvian  Hours: Mon - Sun Appt. Only  Fees: Self Pay   Phone: (523) 983-3156 Email: lupe@NileGuide Website: http://www.NileGuide/          Important Numbers & Websites       Emergency Services   911  [facility]   311  Poison Control   (479) 539-3863  Suicide Prevention Lifeline   (339) 523-6273 (TALK)  Child Abuse Hotline   (354) 336-9462 (4-A-Child)  Sexual Assault Hotline   (830) 717-8088 (HOPE)  National Runaway Safeline   (984) 890-4946 (RUNAWAY)  All-Options Talkline   (487) 451-7491  Substance Abuse Referral   (720) 994-7567 (HELP)

## 2023-12-07 ENCOUNTER — TELEPHONE (OUTPATIENT)
Dept: PHARMACY | Facility: CLINIC | Age: 73
End: 2023-12-07
Payer: COMMERCIAL

## 2023-12-07 NOTE — TELEPHONE ENCOUNTER
This patient is due for MTM follow-up. I called the patient to schedule an appointment.     Left message. Patient is not reachable after several attempts. We will stop reaching out to the patient at this time. Please let us know if we can assist in this patient's care in the future. Routed to PCP as TRACEY.    Note she does have an appointment with Dr. Soares 1/2/2024    Analilia Lacey, Richy BCPS  Medication Therapy Management Practitioner  Voicemail #[phone]

## 2024-01-02 ENCOUNTER — OFFICE VISIT (OUTPATIENT)
Dept: PEDIATRICS | Facility: CLINIC | Age: 74
End: 2024-01-02
Payer: COMMERCIAL

## 2024-01-02 VITALS
BODY MASS INDEX: 22.64 KG/M2 | HEIGHT: 68 IN | OXYGEN SATURATION: 100 % | HEART RATE: 79 BPM | SYSTOLIC BLOOD PRESSURE: 127 MMHG | TEMPERATURE: 97.8 F | DIASTOLIC BLOOD PRESSURE: 82 MMHG | RESPIRATION RATE: 12 BRPM | WEIGHT: 149.4 LBS

## 2024-01-02 DIAGNOSIS — Z12.11 SCREEN FOR COLON CANCER: ICD-10-CM

## 2024-01-02 DIAGNOSIS — Z00.00 ENCOUNTER FOR MEDICARE ANNUAL WELLNESS EXAM: Primary | ICD-10-CM

## 2024-01-02 DIAGNOSIS — Z29.11 NEED FOR VACCINATION AGAINST RESPIRATORY SYNCYTIAL VIRUS: ICD-10-CM

## 2024-01-02 DIAGNOSIS — E11.65 TYPE 2 DIABETES MELLITUS WITH HYPERGLYCEMIA, WITHOUT LONG-TERM CURRENT USE OF INSULIN (H): ICD-10-CM

## 2024-01-02 DIAGNOSIS — M81.0 OSTEOPOROSIS, UNSPECIFIED OSTEOPOROSIS TYPE, UNSPECIFIED PATHOLOGICAL FRACTURE PRESENCE: ICD-10-CM

## 2024-01-02 DIAGNOSIS — I10 HYPERTENSION GOAL BP (BLOOD PRESSURE) < 140/90: ICD-10-CM

## 2024-01-02 DIAGNOSIS — Z23 NEED FOR SHINGLES VACCINE: ICD-10-CM

## 2024-01-02 DIAGNOSIS — Z12.31 VISIT FOR SCREENING MAMMOGRAM: ICD-10-CM

## 2024-01-02 PROBLEM — Z79.4: Status: RESOLVED | Noted: 2024-01-02 | Resolved: 2024-01-02

## 2024-01-02 PROBLEM — E11.29: Status: ACTIVE | Noted: 2024-01-02

## 2024-01-02 PROBLEM — E11.29: Status: RESOLVED | Noted: 2024-01-02 | Resolved: 2024-01-02

## 2024-01-02 PROBLEM — Z79.4: Status: ACTIVE | Noted: 2024-01-02

## 2024-01-02 LAB
ALBUMIN SERPL BCG-MCNC: 4.2 G/DL (ref 3.5–5.2)
ALP SERPL-CCNC: 61 U/L (ref 40–150)
ALT SERPL W P-5'-P-CCNC: 11 U/L (ref 0–50)
ANION GAP SERPL CALCULATED.3IONS-SCNC: 13 MMOL/L (ref 7–15)
AST SERPL W P-5'-P-CCNC: 21 U/L (ref 0–45)
BILIRUB SERPL-MCNC: 0.5 MG/DL
BUN SERPL-MCNC: 21.9 MG/DL (ref 8–23)
CALCIUM SERPL-MCNC: 9.6 MG/DL (ref 8.8–10.2)
CHLORIDE SERPL-SCNC: 103 MMOL/L (ref 98–107)
CHOLEST SERPL-MCNC: 190 MG/DL
CREAT SERPL-MCNC: 0.9 MG/DL (ref 0.51–0.95)
CREAT UR-MCNC: 82.8 MG/DL
DEPRECATED HCO3 PLAS-SCNC: 24 MMOL/L (ref 22–29)
EGFRCR SERPLBLD CKD-EPI 2021: 68 ML/MIN/1.73M2
FASTING STATUS PATIENT QL REPORTED: YES
GLUCOSE SERPL-MCNC: 137 MG/DL (ref 70–99)
HBA1C MFR BLD: 8.2 % (ref 0–5.6)
HDLC SERPL-MCNC: 51 MG/DL
LDLC SERPL CALC-MCNC: 123 MG/DL
MICROALBUMIN UR-MCNC: 13.6 MG/L
MICROALBUMIN/CREAT UR: 16.43 MG/G CR (ref 0–25)
NONHDLC SERPL-MCNC: 139 MG/DL
POTASSIUM SERPL-SCNC: 4.2 MMOL/L (ref 3.4–5.3)
PROT SERPL-MCNC: 7.1 G/DL (ref 6.4–8.3)
SODIUM SERPL-SCNC: 140 MMOL/L (ref 135–145)
TRIGL SERPL-MCNC: 80 MG/DL

## 2024-01-02 PROCEDURE — 80061 LIPID PANEL: CPT | Performed by: INTERNAL MEDICINE

## 2024-01-02 PROCEDURE — 80053 COMPREHEN METABOLIC PANEL: CPT | Performed by: INTERNAL MEDICINE

## 2024-01-02 PROCEDURE — 82043 UR ALBUMIN QUANTITATIVE: CPT | Performed by: INTERNAL MEDICINE

## 2024-01-02 PROCEDURE — 82570 ASSAY OF URINE CREATININE: CPT | Performed by: INTERNAL MEDICINE

## 2024-01-02 PROCEDURE — 99214 OFFICE O/P EST MOD 30 MIN: CPT | Mod: 25 | Performed by: INTERNAL MEDICINE

## 2024-01-02 PROCEDURE — G0439 PPPS, SUBSEQ VISIT: HCPCS | Performed by: INTERNAL MEDICINE

## 2024-01-02 PROCEDURE — 36415 COLL VENOUS BLD VENIPUNCTURE: CPT | Performed by: INTERNAL MEDICINE

## 2024-01-02 PROCEDURE — 83036 HEMOGLOBIN GLYCOSYLATED A1C: CPT | Performed by: INTERNAL MEDICINE

## 2024-01-02 RX ORDER — RESPIRATORY SYNCYTIAL VIRUS VACCINE 120MCG/0.5
0.5 KIT INTRAMUSCULAR ONCE
Qty: 1 EACH | Refills: 0 | Status: CANCELLED | OUTPATIENT
Start: 2024-01-02 | End: 2024-01-02

## 2024-01-02 RX ORDER — BLOOD-GLUCOSE METER
EACH MISCELLANEOUS
COMMUNITY
Start: 2023-05-04

## 2024-01-02 ASSESSMENT — ACTIVITIES OF DAILY LIVING (ADL): CURRENT_FUNCTION: NO ASSISTANCE NEEDED

## 2024-01-02 ASSESSMENT — ENCOUNTER SYMPTOMS
DIARRHEA: 0
HEMATURIA: 0
WEAKNESS: 1
CHILLS: 0
DIZZINESS: 0
JOINT SWELLING: 0
NAUSEA: 0
SHORTNESS OF BREATH: 0
FREQUENCY: 0
SORE THROAT: 0
PALPITATIONS: 0
BREAST MASS: 0
HEMATOCHEZIA: 0
ARTHRALGIAS: 0
MYALGIAS: 0
EYE PAIN: 0
ABDOMINAL PAIN: 0
NERVOUS/ANXIOUS: 0
DYSURIA: 0
FEVER: 0
HEADACHES: 0
HEARTBURN: 0
CONSTIPATION: 0
PARESTHESIAS: 0
COUGH: 1

## 2024-01-02 ASSESSMENT — PAIN SCALES - GENERAL: PAINLEVEL: NO PAIN (0)

## 2024-01-02 NOTE — LETTER
January 4, 2024      Jennifer Presley  [address] [place] [address]        Jennifer,     Your LDL cholesterol looks high again, and I'd continue to recommend that we consider placing you on a statin.     Your diabetes blood test (A1c) was improved, but still above goal.  I'd advise you to increase your lantus insulin to 35 units per day, and set up a follow up appointment with me in 3 months for a recheck.     It was good seeing you in the office.  Hope you have a great rest of the day!     Andrez Soares MD   Internal Medicine and Pediatrics       Resulted Orders   COMPREHENSIVE METABOLIC PANEL   Result Value Ref Range    Sodium 140 135 - 145 mmol/L      Comment:      Reference intervals for this test were updated on 09/26/2023 to more accurately reflect our healthy population. There may be differences in the flagging of prior results with similar values performed with this method. Interpretation of those prior results can be made in the context of the updated reference intervals.     Potassium 4.2 3.4 - 5.3 mmol/L    Carbon Dioxide (CO2) 24 22 - 29 mmol/L    Anion Gap 13 7 - 15 mmol/L    Urea Nitrogen 21.9 8.0 - 23.0 mg/dL    Creatinine 0.90 0.51 - 0.95 mg/dL    GFR Estimate 68 >60 mL/min/1.73m2    Calcium 9.6 8.8 - 10.2 mg/dL    Chloride 103 98 - 107 mmol/L    Glucose 137 (H) 70 - 99 mg/dL    Alkaline Phosphatase 61 40 - 150 U/L      Comment:      Reference intervals for this test were updated on 11/14/2023 to more accurately reflect our healthy population. There may be differences in the flagging of prior results with similar values performed with this method. Interpretation of those prior results can be made in the context of the updated reference intervals.    AST 21 0 - 45 U/L      Comment:      Reference intervals for this test were updated on 6/12/2023 to more accurately reflect our healthy population. There may be differences in the flagging of prior results with similar values performed with  Patient would like to speak to a nurse regarding his medications. He can be reached at [phone]. this method. Interpretation of those prior results can be made in the context of the updated reference intervals.    ALT 11 0 - 50 U/L      Comment:      Reference intervals for this test were updated on 6/12/2023 to more accurately reflect our healthy population. There may be differences in the flagging of prior results with similar values performed with this method. Interpretation of those prior results can be made in the context of the updated reference intervals.      Protein Total 7.1 6.4 - 8.3 g/dL    Albumin 4.2 3.5 - 5.2 g/dL    Bilirubin Total 0.5 <=1.2 mg/dL   HEMOGLOBIN A1C   Result Value Ref Range    Hemoglobin A1C 8.2 (H) 0.0 - 5.6 %      Comment:      Normal <5.7%   Prediabetes 5.7-6.4%    Diabetes 6.5% or higher     Note: Adopted from ADA consensus guidelines.   Lipid panel reflex to direct LDL Non-fasting   Result Value Ref Range    Cholesterol 190 <200 mg/dL    Triglycerides 80 <150 mg/dL    Direct Measure HDL 51 >=50 mg/dL    LDL Cholesterol Calculated 123 (H) <=100 mg/dL    Non HDL Cholesterol 139 (H) <130 mg/dL    Patient Fasting > 8hrs? Yes     Narrative    Cholesterol  Desirable:  <200 mg/dL    Triglycerides  Normal:  Less than 150 mg/dL  Borderline High:  150-199 mg/dL  High:  200-499 mg/dL  Very High:  Greater than or equal to 500 mg/dL    Direct Measure HDL  Female:  Greater than or equal to 50 mg/dL   Male:  Greater than or equal to 40 mg/dL    LDL Cholesterol  Desirable:  <100mg/dL  Above Desirable:  100-129 mg/dL   Borderline High:  130-159 mg/dL   High:  160-189 mg/dL   Very High:  >= 190 mg/dL    Non HDL Cholesterol  Desirable:  130 mg/dL  Above Desirable:  130-159 mg/dL  Borderline High:  160-189 mg/dL  High:  190-219 mg/dL  Very High:  Greater than or equal to 220 mg/dL   Albumin Random Urine Quantitative with Creat Ratio   Result Value Ref Range    Creatinine Urine mg/dL 82.8 mg/dL      Comment:      The reference ranges have not been established in urine creatinine. The results  should be integrated into the clinical context for interpretation.    Albumin Urine mg/L 13.6 mg/L      Comment:      The reference ranges have not been established in urine albumin. The results should be integrated into the clinical context for interpretation.    Albumin Urine mg/g Cr 16.43 0.00 - 25.00 mg/g Cr      Comment:      Microalbuminuria is defined as an albumin:creatinine ratio of 17 to 299 for males and 25 to 299 for females. A ratio of albumin:creatinine of 300 or higher is indicative of overt proteinuria.  Due to biologic variability, positive results should be confirmed by a second, first-morning random or 24-hour timed urine specimen. If there is discrepancy, a third specimen is recommended. When 2 out of 3 results are in the microalbuminuria range, this is evidence for incipient nephropathy and warrants increased efforts at glucose control, blood pressure control, and institution of therapy with an angiotensin-converting-enzyme (ACE) inhibitor (if the patient can tolerate it).

## 2024-01-02 NOTE — PROGRESS NOTES
"SUBJECTIVE:   Jennifer is a 73 year old, presenting for the following:  Wellness Visit        1/2/2024    10:41 AM   Additional Questions   Roomed by KARL Faust   Accompanied by n/a         1/2/2024    10:41 AM   Patient Reported Additional Medications   Patient reports taking the following new medications n/a       Are you in the first 12 months of your Medicare coverage?  No    Healthy Habits:     In general, how would you rate your overall health?  Fair    Frequency of exercise:  None    Do you usually eat at least 4 servings of fruit and vegetables a day, include whole grains    & fiber and avoid regularly eating high fat or \"junk\" foods?  No    Taking medications regularly:  Yes    Ability to successfully perform activities of daily living:  No assistance needed    Home Safety:  Throw rugs in the hallway and lack of grab bars in the bathroom    Hearing Impairment:  Need to ask people to speak up or repeat themselves and find that men's voices are easier to understand than woman's    In the past 6 months, have you been bothered by leaking of urine?  No    In general, how would you rate your overall mental or emotional health?  Good    Additional concerns today:  Yes    Some mild hand tremor, seems to be worsening.  Can affect her holding tea.     Now taking lantus insulin.  Higher in the afternoons, after meals.  Current dose is 20-22 units at night.    Diabetes blood test (A1c) was 12.9 last march.      Still refusing alendronate, and not taking her statin regularly      Depressive symptoms due to having to put down her cat.    Once monthly stool incontinence.  Keeps her from going out much    Today's PHQ-2 Score:       1/2/2024     9:34 AM   PHQ-2 ( 1999 Pfizer)   Q1: Little interest or pleasure in doing things 1   Q2: Feeling down, depressed or hopeless 1   PHQ-2 Score 2   Q1: Little interest or pleasure in doing things Several days   Q2: Feeling down, depressed or hopeless Several days   PHQ-2 Score 2 "           Have you ever done Advance Care Planning? (For example, a Health Directive, POLST, or a discussion with a medical provider or your loved ones about your wishes): No, advance care planning information given to patient to review.  Patient plans to discuss their wishes with loved ones or provider.         Fall risk  Fallen 2 or more times in the past year?: No  Any fall with injury in the past year?: Yes  click delete button to remove this line now  Cognitive Screening   1) Repeat 3 items (Leader, Season, Table)    2) Clock draw: NORMAL  3) 3 item recall: Recalls 3 objects  Results: 3 items recalled: COGNITIVE IMPAIRMENT LESS LIKELY    Mini-CogTM Copyright S Vincent. Licensed by the author for use in [facility]; reprinted with permission (sovickie@[facility]). All rights reserved.      Do you have sleep apnea, excessive snoring or daytime drowsiness? : Daytime drowsiness    Reviewed and updated as needed this visit by clinical staff   Tobacco  Allergies  Meds              Reviewed and updated as needed this visit by Provider                 Social History     Tobacco Use    Smoking status: Never    Smokeless tobacco: Never   Substance Use Topics    Alcohol use: Yes     Alcohol/week: 1.7 standard drinks of alcohol             1/2/2024     9:34 AM   Alcohol Use   Prescreen: >3 drinks/day or >7 drinks/week? No     Do you have a current opioid prescription? No  Do you use any other controlled substances or medications that are not prescribed by a provider? None              Current providers sharing in care for this patient include:   Patient Care Team:  Andrez Soares MD as PCP - General (Internal Medicine)  Andrez Soares MD (Internal Medicine)  Andrez Soares MD as Assigned PCP  Analilia Lacey RP as Assigned [place] Pharmacist    The following health maintenance items are reviewed in Epic and correct as of today:  Health Maintenance   Topic Date Due    ANNUAL REVIEW OF HM ORDERS  Never done     MAMMO SCREENING  01/04/2010    RSV VACCINE (Pregnancy & 60+) (1 - 1-dose 60+ series) Never done    COLORECTAL CANCER SCREENING  10/26/2018    MEDICARE ANNUAL WELLNESS VISIT  04/29/2020    CMP  04/29/2020    DIABETIC FOOT EXAM  04/29/2020    Pneumococcal Vaccine: 65+ Years (3 of 3 - PPSV23 or PCV20) 04/29/2020    ZOSTER IMMUNIZATION (2 of 2) 12/14/2021    A1C  06/27/2023    LIPID  01/31/2024    BMP  02/08/2024    MICROALBUMIN  03/27/2024    ADVANCE CARE PLANNING  04/29/2024    EYE EXAM  06/17/2024    FALL RISK ASSESSMENT  01/02/2025    DTAP/TDAP/TD IMMUNIZATION (3 - Td or Tdap) 10/19/2031    DEXA  03/30/2038    HEPATITIS C SCREENING  Completed    MIGRAINE ACTION PLAN  Completed    PHQ-2 (once per calendar year)  Completed    INFLUENZA VACCINE  Completed    COVID-19 Vaccine  Completed    IPV IMMUNIZATION  Aged Out    HPV IMMUNIZATION  Aged Out    MENINGITIS IMMUNIZATION  Aged Out    RSV MONOCLONAL ANTIBODY  Aged Out     Lab work is in process  Labs reviewed in EPIC          Review of Systems   Constitutional:  Negative for chills and fever.   HENT:  Negative for congestion, ear pain, hearing loss and sore throat.    Eyes:  Negative for pain and visual disturbance.   Respiratory:  Positive for cough. Negative for shortness of breath.    Cardiovascular:  Negative for chest pain, palpitations and peripheral edema.   Gastrointestinal:  Negative for abdominal pain, constipation, diarrhea, heartburn, hematochezia and nausea.   Breasts:  Negative for tenderness, breast mass and discharge.   Genitourinary:  Negative for dysuria, frequency, genital sores, hematuria, pelvic pain, urgency, vaginal bleeding and vaginal discharge.   Musculoskeletal:  Negative for arthralgias, joint swelling and myalgias.   Skin:  Positive for rash.   Neurological:  Positive for weakness. Negative for dizziness, headaches and paresthesias.   Psychiatric/Behavioral:  Negative for mood changes. The patient is not nervous/anxious.      CONSTITUTIONAL:  "NEGATIVE for fever, chills, change in weight  INTEGUMENTARY/SKIN: NEGATIVE for worrisome rashes, moles or lesions  EYES: NEGATIVE for vision changes or irritation  ENT/MOUTH: NEGATIVE for ear, mouth and throat problems  RESP: NEGATIVE for significant cough or SOB  BREAST: NEGATIVE for masses, tenderness or discharge  CV: NEGATIVE for chest pain, palpitations or peripheral edema  GI: NEGATIVE for nausea, abdominal pain, heartburn, or change in bowel habits  : NEGATIVE for frequency, dysuria, or hematuria  MUSCULOSKELETAL: NEGATIVE for significant arthralgias or myalgia  NEURO: NEGATIVE for weakness, dizziness or paresthesias  ENDOCRINE: NEGATIVE for temperature intolerance, skin/hair changes  HEME: NEGATIVE for bleeding problems  PSYCHIATRIC: NEGATIVE for changes in mood or affect    OBJECTIVE:   /82 (BP Location: Right arm, Patient Position: Sitting, Cuff Size: Adult Regular)   Pulse 79   Temp 97.8  F (36.6  C) (Oral)   Resp 12   Ht 1.727 m (5' 8\")   Wt 67.8 kg (149 lb 6.4 oz)   SpO2 100%   BMI 22.72 kg/m   Estimated body mass index is 22.72 kg/m  as calculated from the following:    Height as of this encounter: 1.727 m (5' 8\").    Weight as of this encounter: 67.8 kg (149 lb 6.4 oz).  Physical Exam  GENERAL: healthy, alert and no distress  EYES: Eyes grossly normal to inspection, PERRL and conjunctivae and sclerae normal  HENT: ear canals and TM's normal, nose and mouth without ulcers or lesions  NECK: no adenopathy, no asymmetry, masses, or scars and thyroid normal to palpation  RESP: lungs clear to auscultation - no rales, rhonchi or wheezes  CV: regular rate and rhythm, normal S1 S2, no S3 or S4, no murmur, click or rub, no peripheral edema and peripheral pulses strong  ABDOMEN: soft, nontender, no hepatosplenomegaly, no masses and bowel sounds normal  MS: no gross musculoskeletal defects noted, no edema  SKIN: no suspicious lesions or rashes  NEURO: Normal strength and tone, mentation intact and " speech normal  PSYCH: mentation appears normal, affect normal/bright        ASSESSMENT / PLAN:   (Z00.00) Encounter for Medicare annual wellness exam  (primary encounter diagnosis)  Comment:   Plan: zoster vaccine recombinant adjuvanted         (SHINGRIX) injection        Refuses colonoscopy.  Refuses mammogram  Refuses statin.      (E11.65) Type 2 diabetes mellitus with hyperglycemia, without long-term current use of insulin (H)  Comment:   Plan: HEMOGLOBIN A1C, Lipid panel reflex to direct         LDL Non-fasting, insulin glargine (LANTUS PEN)         100 UNIT/ML pen, Albumin Random Urine         Quantitative with Creat Ratio, OFFICE/OUTPT         VISIT,EST,LEVL IV        Poor control.  Refusing short acting insulin and all oral agents due to gastroenterology side effects.     (Z23) Need for shingles vaccine  Comment:   Plan: agrees.    (Z29.11) Need for vaccination against respiratory syncytial virus  Comment:   Plan:     (Z12.31) Visit for screening mammogram  Comment:   Plan: refuse    (Z12.11) Screen for colon cancer  Comment:   Plan: refuse    (I10) Hypertension goal BP (blood pressure) < 140/90  Comment:   Plan: COMPREHENSIVE METABOLIC PANEL, OFFICE/OUTPT         VISIT,EST,LEVL IV        At goal.     (M81.0) Osteoporosis, unspecified osteoporosis type, unspecified pathological fracture presence  Comment:   Plan: OFFICE/OUTPT VISIT,EST,LEVL IV        Refuses bisphosphanates.     Patient has been advised of split billing requirements and indicates understanding: Yes      COUNSELING:  Reviewed preventive health counseling, as reflected in patient instructions       Regular exercise       Healthy diet/nutrition       Vision screening       Hearing screening       Colon cancer screening        She reports that she has never smoked. She has never used smokeless tobacco.      Appropriate preventive services were discussed with this patient, including applicable screening as appropriate for fall prevention,  nutrition, physical activity, Tobacco-use cessation, weight loss and cognition.  Checklist reviewing preventive services available has been given to the patient.    Reviewed patients plan of care and provided an AVS. The Basic Care Plan (routine screening as documented in Health Maintenance) for Jennifer meets the Care Plan requirement. This Care Plan has been established and reviewed with the Patient.          Andrez Soares MD  [facility]    Identified Health Risks:  I have reviewed Opioid Use Disorder and Substance Use Disorder risk factors and made any needed referrals.

## 2024-01-02 NOTE — PATIENT INSTRUCTIONS
"Shingrix #2 today.    RSV shot at a pharmcy    Lab work today:  We can do labs in the exam room today, or you can get them done downstairs in the lab.      If you are going downstairs:  Directions:  As you walk through the first floor, you'll see (on the right) first the pharmacy, then some bathrooms, then the \"Lab and Imaging\" area. Give them your name at the window there and wait for them to call you.     If your diabetes blood test (A1c) is still high, we may need to add in a mealtime short acting insulin.      Stay on the 30 untis of lantus insulin for now.     Make sure to take your calcium and vitamin D.     Andrez Soares MD  Internal Medicine and Pediatrics       Patient Education   Personalized Prevention Plan  You are due for the preventive services outlined below.  Your care team is available to assist you in scheduling these services.  If you have already completed any of these items, please share that information with your care team to update in your medical record.  Health Maintenance Due   Topic Date Due    ANNUAL REVIEW OF HM ORDERS  Never done    Mammogram  01/04/2010    RSV VACCINE (Pregnancy & 60+) (1 - 1-dose 60+ series) Never done    Colorectal Cancer Screening  10/26/2018    Annual Wellness Visit  04/29/2020    Comprehensive Metabolic Panel  04/29/2020    Diabetic Foot Exam  04/29/2020    Pneumococcal Vaccine (3 of 3 - PPSV23 or PCV20) 04/29/2020    Zoster (Shingles) Vaccine (2 of 2) 12/14/2021    A1C Lab  06/27/2023    Cholesterol Lab  01/31/2024     Preventing Falls: Care Instructions  Injuries and health problems such as trouble walking or poor eyesight can increase your risk of falling. So can some medicines. But there are things you can do to help prevent falls. You can exercise to get stronger. You can also arrange your home to make it safer.    Talk to your doctor about the medicines you take. Ask if any of them increase the risk of falls and whether they can be changed or stopped.   Try to " "exercise regularly. It can help improve your strength and balance. This can help lower your risk of falling.     Practice fall safety and prevention.    Wear low-heeled shoes that fit well and give your feet good support. Talk to your doctor if you have foot problems that make this hard.  Carry a cellphone or wear a medical alert device that you can use to call for help.  Use stepladders instead of chairs to reach high objects. Don't climb if you're at risk for falls. Ask for help, if needed.  Wear the correct eyeglasses, if you need them.    Make your home safer.    Remove rugs, cords, clutter, and furniture from walkways.  Keep your house well lit. Use night-lights in hallways and bathrooms.  Install and use sturdy handrails on stairways.  Wear nonskid footwear, even inside. Don't walk barefoot or in socks without shoes.    Be safe outside.    Use handrails, curb cuts, and ramps whenever possible.  Keep your hands free by using a shoulder bag or backpack.  Try to walk in well-lit areas. Watch out for uneven ground, changes in pavement, and debris.  Be careful in the winter. Walk on the grass or gravel when sidewalks are slippery. Use de-icer on steps and walkways. Add non-slip devices to shoes.    Put grab bars and nonskid mats in your shower or tub and near the toilet. Try to use a shower chair or bath bench when bathing.   Get into a tub or shower by putting in your weaker leg first. Get out with your strong side first. Have a phone or medical alert device in the bathroom with you.   Where can you learn more?  Go to https://www.[employer].net/patiented  Enter G117 in the search box to learn more about \"Preventing Falls: Care Instructions.\"  Current as of: July 18, 2023               Content Version: 13.8    3610-0233 [employer].   Care instructions adapted under license by your healthcare professional. If you have questions about a medical condition or this instruction, always ask your healthcare " professional. [employer] disclaims any warranty or liability for your use of this information.      How to Get Up Safely After a Fall: Care Instructions  Overview     If you have injuries, health problems, or other reasons that may make it easy for you to fall at home, it is a good idea to learn how to get up safely after a fall. Learning how to get up correctly can help you avoid making an injury worse.  Also, knowing what to do if you cannot get up can help you stay safe until help arrives.  Follow-up care is a key part of your treatment and safety. Be sure to make and go to all appointments, and call your doctor if you are having problems. It's also a good idea to know your test results and keep a list of the medicines you take.  How can you care for yourself after a fall?  If you think you can get up  First lie still for a few minutes and think about how you feel. If your body feels okay and you think you can get up safely, follow the rest of the steps below:  Look for a chair or other piece of furniture that is close to you.  Roll onto your side and rest. Roll by turning your head in the direction you want to roll, move your shoulder and arm, then hip and leg in the same direction.  Lie still for a moment to let your blood pressure adjust.  Slowly push your upper body up, lift your head, and take a moment to rest.  Slowly get up on your hands and knees, and crawl to the chair or other stable piece of furniture.  Put your hands on the chair.  Move one foot forward, and place it flat on the floor. Your other leg should be bent with the knee on the floor.  Rise slowly, turn your body, and sit in the chair. Stay seated for a bit and think about how you feel. Call for help. Even if you feel okay, let someone know what happened to you. You might not know that you have a serious injury.  If you cannot get up  If you think you are injured after a fall or you cannot get up, try not to panic.  Call out for  "help.  If you have a phone within reach or you have an emergency call device, use it to call for help.  If you do not have a phone within reach, try to slide yourself toward it. If you cannot get to the phone, try to slide toward a door or window or a place where you think you can be heard.  [place] or use an object to make noise so someone might hear you.  If you can reach something that you can use for a pillow, place it under your head. Try to stay warm by covering yourself with a blanket or clothing while you wait for help.  When should you call for help?   Call 911 anytime you think you may need emergency care. For example, call if:    You passed out (lost consciousness).     You cannot get up after a fall.     You have severe pain.   Call your doctor now or seek immediate medical care if:    You have new or worse pain.     You are dizzy or lightheaded.     You hit your head.   Watch closely for changes in your health, and be sure to contact your doctor if:    You do not get better as expected.   Where can you learn more?  Go to https://www.[employer].net/patiented  Enter G513 in the search box to learn more about \"How to Get Up Safely After a Fall: Care Instructions.\"  Current as of: November 13, 2022               Content Version: 13.8    0066-1617 [employer].   Care instructions adapted under license by your healthcare professional. If you have questions about a medical condition or this instruction, always ask your healthcare professional. [employer] disclaims any warranty or liability for your use of this information.         "

## 2024-01-04 NOTE — COMMUNITY RESOURCES LIST (ENGLISH)
01/04/2024   [facility]  N/A  For questions about this resource list or additional care needs, please contact your primary care clinic or care manager.  Phone: [phone]   Email: N/A   Address: [address] [place] [id number]   Hours: N/A        Transportation       Free or low-cost transportation  1  [place]. Distance: 4.46 miles      In-Person   [address] [facility] [address] [place] [address]  Language: English  Hours: Mon - Fri 7:00 AM - 5:00 PM  Fees: Free   Phone: (915) 448-6131 Email: [email or web address].vivio Website: https://[employer]/     2  [employer] [facility] Circulator Bus Distance: 9.41 miles      In-Person   [address] [place] [address]  Language: English  Hours: Tue 9:00 AM - 2:00 PM  Fees: Self Pay   Phone: (285) 463-7407 Email: info@[employer] Website: http://www.[employer].org/     Transportation to medical appointments  3  [place] Mobility Distance: 1.85 miles      In-Person   [address] [facility] [address] [place] [address]  Language: Wolof, English, Oromo, [place]  Hours: Mon - Sat 5:00 AM - 9:00 PM  Fees: Insurance, Self Pay   Phone: (326) 777-6399 Email: info@[employer] Website: http://www.[employer]/     4  Assisted Transport Distance: 4.73 miles      In-Person   [address] [place] [address]  Language: English, Citizen of [place]  Hours: Mon - Sun Appt. Only  Fees: Self Pay   Phone: (296) 445-7376 Email: lupe@[employer] Website: http://www.[employer]/          Important Numbers & Websites       Emergency Services   911  City Services   311  Poison Control   (592) 419-6943  Suicide Prevention Lifeline   (881) 416-7621 (TALK)  Child Abuse Hotline   (173) 501-5548 (4-A-Child)  Sexual Assault Hotline   (980) 790-3569 (HOPE)  National Runaway Safeline   (204) 538-1129 (RUNAWAY)  All-Options Talkline   (892) 576-8831  Substance Abuse Referral   (021)  969-0510 (HELP)

## 2024-01-31 DIAGNOSIS — E78.5 HYPERLIPIDEMIA LDL GOAL <100: ICD-10-CM

## 2024-01-31 RX ORDER — ATORVASTATIN CALCIUM 40 MG/1
40 TABLET, FILM COATED ORAL DAILY
Qty: 90 TABLET | Refills: 3 | Status: SHIPPED | OUTPATIENT
Start: 2024-01-31

## 2024-01-31 NOTE — TELEPHONE ENCOUNTER
REfilled.  Please just reiterate that she should be on 40 mg daily; it does not work to take as needed.      Let us know if she is having any side effects.    Andrez Soares MD  Internal Medicine and Pediatrics

## 2024-02-01 NOTE — TELEPHONE ENCOUNTER
Received a voicemail from the patient today (2/1/2024) at 8:38 AM   - Patient requesting a call back at [phone]    Called the patient back at [phone]   - Informed the patient that Dr. Soares sent in a prescription for atorvastatin (LIPITOR) 40 MG tablet to the [place] [address] [place] [address]   - Reiterated to the patient to take 1 tablet (40 mg) by mouth daily as this does not work to take as needed   - Informed the patient to inform/notify us if she is experiencing any side effects   - Patient verbalized understanding and agrees with the plan     Jewell OSPINA RN   Patient Advocate Liaison (PAL)  [facility]   [phone]

## 2024-02-01 NOTE — TELEPHONE ENCOUNTER
Called the patient at [phone] and left a message requesting a call back   - Provided PAL RN direct line   - Awaiting a call back at this time     Jewell OSPINA RN   Patient Advocate Liaison (PAL)  [facility]   [phone]

## 2024-06-14 DIAGNOSIS — E11.65 TYPE 2 DIABETES MELLITUS WITH HYPERGLYCEMIA, WITHOUT LONG-TERM CURRENT USE OF INSULIN (H): ICD-10-CM

## 2024-06-28 ENCOUNTER — TRANSFERRED RECORDS (OUTPATIENT)
Dept: MULTI SPECIALTY CLINIC | Facility: CLINIC | Age: 74
End: 2024-06-28
Payer: COMMERCIAL

## 2024-06-28 LAB — RETINOPATHY: NORMAL

## 2024-07-02 ENCOUNTER — OFFICE VISIT (OUTPATIENT)
Dept: PEDIATRICS | Facility: CLINIC | Age: 74
End: 2024-07-02
Payer: COMMERCIAL

## 2024-07-02 ENCOUNTER — TELEPHONE (OUTPATIENT)
Dept: PEDIATRICS | Facility: CLINIC | Age: 74
End: 2024-07-02

## 2024-07-02 VITALS
TEMPERATURE: 97.1 F | HEIGHT: 68 IN | BODY MASS INDEX: 23.57 KG/M2 | DIASTOLIC BLOOD PRESSURE: 84 MMHG | SYSTOLIC BLOOD PRESSURE: 120 MMHG | WEIGHT: 155.5 LBS | HEART RATE: 76 BPM | OXYGEN SATURATION: 100 %

## 2024-07-02 DIAGNOSIS — Z12.31 VISIT FOR SCREENING MAMMOGRAM: ICD-10-CM

## 2024-07-02 DIAGNOSIS — E78.5 HYPERLIPIDEMIA LDL GOAL <100: ICD-10-CM

## 2024-07-02 DIAGNOSIS — R61 NIGHT SWEATS: ICD-10-CM

## 2024-07-02 DIAGNOSIS — E11.65 TYPE 2 DIABETES MELLITUS WITH HYPERGLYCEMIA, WITHOUT LONG-TERM CURRENT USE OF INSULIN (H): Primary | ICD-10-CM

## 2024-07-02 DIAGNOSIS — M81.0 OSTEOPOROSIS, UNSPECIFIED OSTEOPOROSIS TYPE, UNSPECIFIED PATHOLOGICAL FRACTURE PRESENCE: ICD-10-CM

## 2024-07-02 DIAGNOSIS — Z12.11 SCREEN FOR COLON CANCER: ICD-10-CM

## 2024-07-02 DIAGNOSIS — G62.9 PERIPHERAL POLYNEUROPATHY: ICD-10-CM

## 2024-07-02 PROBLEM — E11.9 DIABETES MELLITUS, TYPE 2 (H): Status: RESOLVED | Noted: 2023-03-27 | Resolved: 2024-07-02

## 2024-07-02 LAB
BASOPHILS # BLD AUTO: 0 10E3/UL (ref 0–0.2)
BASOPHILS NFR BLD AUTO: 0 %
EOSINOPHIL # BLD AUTO: 0.3 10E3/UL (ref 0–0.7)
EOSINOPHIL NFR BLD AUTO: 5 %
ERYTHROCYTE [DISTWIDTH] IN BLOOD BY AUTOMATED COUNT: 12.8 % (ref 10–15)
HBA1C MFR BLD: 6.6 % (ref 0–5.6)
HCT VFR BLD AUTO: 34.8 % (ref 35–47)
HGB BLD-MCNC: 11.6 G/DL (ref 11.7–15.7)
IMM GRANULOCYTES # BLD: 0 10E3/UL
IMM GRANULOCYTES NFR BLD: 0 %
LYMPHOCYTES # BLD AUTO: 1.9 10E3/UL (ref 0.8–5.3)
LYMPHOCYTES NFR BLD AUTO: 33 %
MCH RBC QN AUTO: 31 PG (ref 26.5–33)
MCHC RBC AUTO-ENTMCNC: 33.3 G/DL (ref 31.5–36.5)
MCV RBC AUTO: 93 FL (ref 78–100)
MONOCYTES # BLD AUTO: 0.4 10E3/UL (ref 0–1.3)
MONOCYTES NFR BLD AUTO: 7 %
NEUTROPHILS # BLD AUTO: 3.2 10E3/UL (ref 1.6–8.3)
NEUTROPHILS NFR BLD AUTO: 55 %
PLATELET # BLD AUTO: 256 10E3/UL (ref 150–450)
RBC # BLD AUTO: 3.74 10E6/UL (ref 3.8–5.2)
TSH SERPL DL<=0.005 MIU/L-ACNC: 3.37 UIU/ML (ref 0.3–4.2)
WBC # BLD AUTO: 5.8 10E3/UL (ref 4–11)

## 2024-07-02 PROCEDURE — 36415 COLL VENOUS BLD VENIPUNCTURE: CPT | Performed by: INTERNAL MEDICINE

## 2024-07-02 PROCEDURE — 99207 PR FOOT EXAM NO CHARGE: CPT | Performed by: INTERNAL MEDICINE

## 2024-07-02 PROCEDURE — 85025 COMPLETE CBC W/AUTO DIFF WBC: CPT | Performed by: INTERNAL MEDICINE

## 2024-07-02 PROCEDURE — 83036 HEMOGLOBIN GLYCOSYLATED A1C: CPT | Performed by: INTERNAL MEDICINE

## 2024-07-02 PROCEDURE — 84443 ASSAY THYROID STIM HORMONE: CPT | Performed by: INTERNAL MEDICINE

## 2024-07-02 PROCEDURE — 99214 OFFICE O/P EST MOD 30 MIN: CPT | Performed by: INTERNAL MEDICINE

## 2024-07-02 RX ORDER — ZOSTER VACCINE RECOMBINANT, ADJUVANTED 50 MCG/0.5
KIT INTRAMUSCULAR
COMMUNITY
Start: 2024-02-01 | End: 2024-07-02

## 2024-07-02 RX ORDER — RESPIRATORY SYNCYTIAL VISUS VACCINE RECOMBINANT, ADJUVANTED 120MCG/0.5
KIT INTRAMUSCULAR
COMMUNITY
Start: 2024-02-01 | End: 2024-07-02

## 2024-07-02 RX ORDER — COVID-19 VACCINE, MRNA 0.04 MG/.418ML
INJECTION, SUSPENSION INTRAMUSCULAR
COMMUNITY
Start: 2023-11-02 | End: 2024-07-02

## 2024-07-02 RX ORDER — ASPIRIN 81 MG/1
81 TABLET ORAL DAILY
COMMUNITY
Start: 2024-07-02

## 2024-07-02 RX ORDER — INFLUENZA A VIRUS A/NEBRASKA/14/2019 (H1N1) ANTIGEN (MDCK CELL DERIVED, PROPIOLACTONE INACTIVATED), INFLUENZA A VIRUS A/DELAWARE/39/2019 (H3N2) ANTIGEN (MDCK CELL DERIVED, PROPIOLACTONE INACTIVATED), INFLUENZA B VIRUS B/SINGAPORE/INFTT-16-0610/2016 ANTIGEN (MDCK CELL DERIVED, PROPIOLACTONE INACTIVATED), INFLUENZA B VIRUS B/DARWIN/7/2019 ANTIGEN (MDCK CELL DERIVED, PROPIOLACTONE INACTIVATED) 15; 15; 15; 15 UG/.5ML; UG/.5ML; UG/.5ML; UG/.5ML
INJECTION, SUSPENSION INTRAMUSCULAR
COMMUNITY
Start: 2023-11-02 | End: 2024-07-02

## 2024-07-02 ASSESSMENT — PAIN SCALES - GENERAL: PAINLEVEL: NO PAIN (0)

## 2024-07-02 NOTE — PROGRESS NOTES
Assessment & Plan     Visit for screening mammogram  Declines.     Screen for colon cancer  Declines.     Type 2 diabetes mellitus with hyperglycemia, without long-term current use of insulin (H)  AM sugars good on the glargine, and is now open to low dose glimepiride if diabetes blood test (A1c) not at goal.   - HEMOGLOBIN A1C; Future  - FOOT EXAM  - HEMOGLOBIN A1C    HYPERLIPIDEMIA LDL GOAL <100  Ongoing statin.     Osteoporosis, unspecified osteoporosis type, unspecified pathological fracture presence  Referred to endocrinology.   - Adult Endocrinology  Referral; Future    Peripheral polyneuropathy  Secondary risk factor modification.     Night sweats  Labs today   - TSH with free T4 reflex; Future  - CBC with platelets and differential; Future  - TSH with free T4 reflex  - CBC with platelets and differential        Blood sugar testing frequency justification:  Uncontrolled diabetes        Alfonso Rodriguez is a 73 year old, presenting for the following health issues:  RECHECK        7/2/2024     9:33 AM   Additional Questions   Roomed by Jeny Henry CMA   Accompanied by N/A         7/2/2024     9:33 AM   Patient Reported Additional Medications   Patient reports taking the following new medications N/A     History of Present Illness       Diabetes:   She presents for follow up of diabetes.  She is checking home blood glucose two times daily.   She checks blood glucose before meals and at bedtime.  Blood glucose is sometimes over 200 and sometimes under 70.  When her blood glucose is low, the patient is asymptomatic for confusion, blurred vision, lethargy and reports not feeling dizzy, shaky, or weak.   She has no concerns regarding her diabetes at this time.  She is having numbness in feet and weight gain.  The patient has had a diabetic eye exam in the last 12 months. Eye exam performed on june28. Location of last eye exam [facility] eye [facility].        She eats 0-1 servings of fruits and  "vegetables daily.She consumes 0 sweetened beverage(s) daily.She exercises with enough effort to increase her heart rate 9 or less minutes per day.  She exercises with enough effort to increase her heart rate 3 or less days per week.   She is taking medications regularly.     AM sugars in 60-70s.  Taking lantus 30 units, but often decreasing to 26 to stretch out the life of her vials.  Not taking atorvastatin regularly.               Review of Systems  Constitutional, HEENT, cardiovascular, pulmonary, GI, , musculoskeletal, neuro, skin, endocrine and psych systems are negative, except as otherwise noted.      Objective    /84 (BP Location: Right arm, Patient Position: Sitting, Cuff Size: Adult Regular)   Pulse 76   Temp 97.1  F (36.2  C) (Temporal)   Ht 1.727 m (5' 8\")   Wt 70.5 kg (155 lb 8 oz)   SpO2 100%   BMI 23.64 kg/m    Body mass index is 23.64 kg/m .  Physical Exam   GENERAL: alert and no distress  EYES: Eyes grossly normal to inspection, PERRL and conjunctivae and sclerae normal  HENT: ear canals and TM's normal, nose and mouth without ulcers or lesions  NECK: no adenopathy, no asymmetry, masses, or scars  RESP: lungs clear to auscultation - no rales, rhonchi or wheezes  CV: regular rate and rhythm, normal S1 S2, no S3 or S4, no murmur, click or rub, no peripheral edema  ABDOMEN: soft, nontender, no hepatosplenomegaly, no masses and bowel sounds normal  MS: no gross musculoskeletal defects noted, no edema  SKIN: no suspicious lesions or rashes  NEURO: Normal strength and tone, mentation intact and speech normal  PSYCH: mentation appears normal, affect normal/bright    Diabetic Foot Exam:  normal DP and PT pulses, no trophic changes or ulcerative lesions, and  absent light touch.            Signed Electronically by: Andrez Soares MD    "

## 2024-07-02 NOTE — TELEPHONE ENCOUNTER
----- Message from Andrez Soares sent at 7/2/2024  4:43 PM CDT -----  Jennifer,    Your thyroid stimulating hormone (TSH), complete blood count and diabetes blood test (A1c) looked great.  No need to adjust your diabetes mellitus meds at this point!    It was good seeing you in the office.  Hope you have a great rest of the day!    Andrez Soares MD  Internal Medicine and Pediatrics

## 2024-07-02 NOTE — RESULT ENCOUNTER NOTE
"[facility]  Discharge- Lo Talley 1946, 77 y.o. female MRN: [id number]  Unit/Bed#: MS [facility]-01 Encounter: [id number]  Primary Care Provider: No primary care provider on file.   Date and time admitted to hospital: 2/8/2024 12:03 PM    * Altered mental status  Assessment & Plan  Patient at baseline mental status, please refer to H&P.    Atrial fibrillation (HCC)  Assessment & Plan  Recently resumed Eliquis 5 mg twice daily  Continue Coreg for rate control  Episode of transient hypotension, Coreg reduced.    Pancreatic lesion  Assessment & Plan  Noted on CT abdomen pelvis, follow-up repeat imaging 2 years  Can follow-up outpatient    Moderate protein-calorie malnutrition (HCC)  Assessment & Plan  Malnutrition Findings:           BMI Findings:           Body mass index is 19.93 kg/m².       HTN (hypertension)  Assessment & Plan  Transient hypotension reported at the nursing facility, resolved  Continued Coreg at reduced dose, 6.25mg BID given reports of hypotension prior to admission    Cerebral amyloid angiopathy (CODE)  Assessment & Plan  Continue outpatient follow up with Neurology      Medical Problems       Resolved Problems  Date Reviewed: 2/9/2024   None       Discharging Physician / Practitioner: Scott Hooper DO  PCP: No primary care provider on file.  Admission Date:   Admission Orders (From admission, onward)       Ordered        02/08/24 1637  Place in Observation  Once                          Discharge Date: 02/09/24    Condition at Discharge: good    Discharge Day Visit / Exam:   Subjective: No acute complaints  Vitals: Blood Pressure: 128/76 (02/09/24 1443)  Pulse: 86 (02/09/24 1443)  Temperature: (!) 96.3 °F (35.7 °C) (02/09/24 1443)  Temp Source: Axillary (02/09/24 1443)  Respirations: 17 (02/09/24 1443)  Height: 5' 6\" (167.6 cm) (02/08/24 1825)  Weight - Scale: 56 kg (123 lb 7.3 oz) (02/08/24 1827)  SpO2: 97 % (02/09/24 1443)  Exam:   Physical Exam  Vitals and " 1st attempt   Called patient LV for pt to call back for results.     Peter Rose MA on 7/2/2024 at 5:26 PM     nursing note reviewed.   HENT:      Head: Normocephalic and atraumatic.   Cardiovascular:      Rate and Rhythm: Normal rate.      Pulses: Normal pulses.   Pulmonary:      Effort: Pulmonary effort is normal.   Musculoskeletal:         General: Normal range of motion.      Cervical back: Normal range of motion.   Skin:     General: Skin is warm.   Neurological:      Mental Status: She is alert. Mental status is at baseline.   Psychiatric:         Mood and Affect: Mood normal.         Behavior: Behavior normal.         Thought Content: Thought content normal.         Judgment: Judgment normal.          Discussion with Family: Updated  () via phone.    Discharge instructions/Information to patient and family:   See after visit summary for information provided to patient and family.      Provisions for Follow-Up Care:  See after visit summary for information related to follow-up care and any pertinent home health orders.      Mobility at time of Discharge:   Basic Mobility Inpatient Raw Score: 9  JH-HLM Goal: 3: Sit at edge of bed  JH-HLM Achieved: 4: Move to chair/commode  HLM Goal achieved. Continue to encourage appropriate mobility.     Disposition:   Other: Skilled nursing facility    Planned Readmission: no     Discharge Statement:  I spent 35 minutes discharging the patient. This time was spent on the day of discharge. I had direct contact with the patient on the day of discharge. Greater than 50% of the total time was spent examining patient, answering all patient questions, arranging and discussing plan of care with patient as well as directly providing post-discharge instructions.  Additional time then spent on discharge activities.    Discharge Medications:  See after visit summary for reconciled discharge medications provided to patient and/or family.      **Please Note: This note may have been constructed using a voice recognition system**

## 2024-07-02 NOTE — PATIENT INSTRUCTIONS
For your blood sugars:    Try to limit complex carbs (rice, bread, pasta, potatoes, cereals) and simple carbs (pop, juice, alcohol, and even milk.)  Eating more lean proteins (chicken, fish, eggs, beans, nuts) and unlimited vegetables and fruits can definitely help as well.     In general, try to spread meals out during the day, eating smaller breakfasts, lunches and dinners--and having healthy snacks in between.  It's a good idea to limit your carbs at mealtimes to 60-75 grams of carbs, and to limit your carbs at snacktimes to 15-30 grams of carbs.  (Check the food labels to add up these carbs.)     Lab work today:  We can do labs in the exam room today, or you can get them done downstairs in the lab.    I've placed an Endocrine referral to discuss non-Fosmax options for your bones.     Continue vitamin D and Calcium every day.    Try to take your lipitor every day.    Begin a daily aspirin 81 mg daily.

## 2024-07-02 NOTE — TELEPHONE ENCOUNTER
1st attempt    Called and LVM for pt to contact and review over lab results.     Peter Rose MA on 7/2/2024 at 5:27 PM

## 2024-07-02 NOTE — LETTER
July 3, 2024      Jennifer Presley  [address] [place] [address]        Dear Ms.Dustinadela,    We are writing to inform you of your test results.    Your test results fall within the expected range(s) or remain unchanged from previous results.  Please continue with current treatment plan.   Your thyroid stimulating hormone (TSH), complete blood count and diabetes blood test (A1c) looked great.  No need to adjust your diabetes mellitus meds at this point!    It was good seeing you in the office.  Hope you have a great rest of the day!      Resulted Orders   HEMOGLOBIN A1C   Result Value Ref Range    Hemoglobin A1C 6.6 (H) 0.0 - 5.6 %      Comment:      Normal <5.7%   Prediabetes 5.7-6.4%    Diabetes 6.5% or higher     Note: Adopted from ADA consensus guidelines.   TSH with free T4 reflex   Result Value Ref Range    TSH 3.37 0.30 - 4.20 uIU/mL   CBC with platelets and differential   Result Value Ref Range    WBC Count 5.8 4.0 - 11.0 10e3/uL    RBC Count 3.74 (L) 3.80 - 5.20 10e6/uL    Hemoglobin 11.6 (L) 11.7 - 15.7 g/dL    Hematocrit 34.8 (L) 35.0 - 47.0 %    MCV 93 78 - 100 fL    MCH 31.0 26.5 - 33.0 pg    MCHC 33.3 31.5 - 36.5 g/dL    RDW 12.8 10.0 - 15.0 %    Platelet Count 256 150 - 450 10e3/uL    % Neutrophils 55 %    % Lymphocytes 33 %    % Monocytes 7 %    % Eosinophils 5 %    % Basophils 0 %    % Immature Granulocytes 0 %    Absolute Neutrophils 3.2 1.6 - 8.3 10e3/uL    Absolute Lymphocytes 1.9 0.8 - 5.3 10e3/uL    Absolute Monocytes 0.4 0.0 - 1.3 10e3/uL    Absolute Eosinophils 0.3 0.0 - 0.7 10e3/uL    Absolute Basophils 0.0 0.0 - 0.2 10e3/uL    Absolute Immature Granulocytes 0.0 <=0.4 10e3/uL       If you have any questions or concerns, please call the clinic at the number listed above.       Sincerely,

## 2024-08-13 DIAGNOSIS — E11.65 TYPE 2 DIABETES MELLITUS WITH HYPERGLYCEMIA, WITHOUT LONG-TERM CURRENT USE OF INSULIN (H): ICD-10-CM

## 2024-08-13 NOTE — TELEPHONE ENCOUNTER
Medication Question or Refill    Contacts       Contact Date/Time Type Contact Phone/Fax    08/13/2024 10:12 AM CDT Phone (Incoming) Jennifer Presley (Self) [phone] (H)            What medication are you calling about (include dose and sig)?: insulin glargine (LANTUS PEN) 100 UNIT/ML pen     Preferred Pharmacy:   [place] [id number] [place] [facility] [address]  [address] [facility] [address]  Phone: [phone] Fax: [phone]      Controlled Substance Agreement on file:   CSA -- Patient Level:    CSA: None found at the patient level.       Who prescribed the medication?: pcp    Do you need a refill? Yes    When did you use the medication last? everyday    Patient offered an appointment? No    Do you have any questions or concerns?  Yes: was in 7/2/24 and refills for insulin were not put in. Didn't realize she is out of refills      Okay to leave a detailed message?: Yes at Home number on file [phone] (home)

## 2024-09-03 ENCOUNTER — TELEPHONE (OUTPATIENT)
Dept: PEDIATRICS | Facility: CLINIC | Age: 74
End: 2024-09-03
Payer: COMMERCIAL

## 2024-09-03 NOTE — TELEPHONE ENCOUNTER
Patient calling and states she had a medical emergency on Sunday around 5 am.  States she was in bed and then was on floor and states she could not move.  States she called 911.  States she dialed 911 but then could not speak to dispatch.  States she was unable to get up and could not go to the door to let paramedics in.  States she was having numbness and tingling.  States her arms and legs were jerking.  States blood sugar was in 40's and gave glucose and she felt better.  BG this am 72.  She feels paramedics saw something on EKG but then went to normal.  Patient declined paramedics bringing her to ER.  Speech normal during call.  Denies any current symptoms.  States mild dizziness comes and goes and feels that heart is racing at times.  Does have appt Thursday am with Dr. Soares.  Discussed 911 or ER if symptoms return prior to visit.  Patient agrees with plan.  Maricruz Sun RN

## 2024-09-05 ENCOUNTER — OFFICE VISIT (OUTPATIENT)
Dept: PEDIATRICS | Facility: CLINIC | Age: 74
End: 2024-09-05
Payer: COMMERCIAL

## 2024-09-05 VITALS
RESPIRATION RATE: 16 BRPM | DIASTOLIC BLOOD PRESSURE: 84 MMHG | WEIGHT: 158.2 LBS | BODY MASS INDEX: 23.98 KG/M2 | OXYGEN SATURATION: 99 % | HEART RATE: 71 BPM | TEMPERATURE: 97.5 F | HEIGHT: 68 IN | SYSTOLIC BLOOD PRESSURE: 129 MMHG

## 2024-09-05 DIAGNOSIS — Z79.4 TYPE 2 DIABETES MELLITUS WITH HYPOGLYCEMIA WITHOUT COMA, WITH LONG-TERM CURRENT USE OF INSULIN (H): ICD-10-CM

## 2024-09-05 DIAGNOSIS — Z12.31 VISIT FOR SCREENING MAMMOGRAM: ICD-10-CM

## 2024-09-05 DIAGNOSIS — R55 SYNCOPE, UNSPECIFIED SYNCOPE TYPE: ICD-10-CM

## 2024-09-05 DIAGNOSIS — Z12.11 SCREEN FOR COLON CANCER: ICD-10-CM

## 2024-09-05 DIAGNOSIS — E11.649 HYPOGLYCEMIC EPISODE IN PATIENT WITH DIABETES MELLITUS (H): Primary | ICD-10-CM

## 2024-09-05 DIAGNOSIS — R42 DIZZINESS: ICD-10-CM

## 2024-09-05 DIAGNOSIS — E11.649 TYPE 2 DIABETES MELLITUS WITH HYPOGLYCEMIA WITHOUT COMA, WITH LONG-TERM CURRENT USE OF INSULIN (H): ICD-10-CM

## 2024-09-05 LAB
ALBUMIN SERPL BCG-MCNC: 4.2 G/DL (ref 3.5–5.2)
ALP SERPL-CCNC: 64 U/L (ref 40–150)
ALT SERPL W P-5'-P-CCNC: 16 U/L (ref 0–50)
ANION GAP SERPL CALCULATED.3IONS-SCNC: 10 MMOL/L (ref 7–15)
AST SERPL W P-5'-P-CCNC: 24 U/L (ref 0–45)
BASOPHILS # BLD AUTO: 0 10E3/UL (ref 0–0.2)
BASOPHILS NFR BLD AUTO: 1 %
BILIRUB SERPL-MCNC: 0.4 MG/DL
BUN SERPL-MCNC: 22.4 MG/DL (ref 8–23)
CALCIUM SERPL-MCNC: 9.4 MG/DL (ref 8.8–10.4)
CHLORIDE SERPL-SCNC: 106 MMOL/L (ref 98–107)
CREAT SERPL-MCNC: 1.06 MG/DL (ref 0.51–0.95)
EGFRCR SERPLBLD CKD-EPI 2021: 56 ML/MIN/1.73M2
EOSINOPHIL # BLD AUTO: 0.2 10E3/UL (ref 0–0.7)
EOSINOPHIL NFR BLD AUTO: 4 %
ERYTHROCYTE [DISTWIDTH] IN BLOOD BY AUTOMATED COUNT: 12.9 % (ref 10–15)
GLUCOSE SERPL-MCNC: 168 MG/DL (ref 70–99)
HBA1C MFR BLD: 6.4 % (ref 0–5.6)
HCO3 SERPL-SCNC: 24 MMOL/L (ref 22–29)
HCT VFR BLD AUTO: 34.2 % (ref 35–47)
HGB BLD-MCNC: 11.2 G/DL (ref 11.7–15.7)
IMM GRANULOCYTES # BLD: 0 10E3/UL
IMM GRANULOCYTES NFR BLD: 0 %
LYMPHOCYTES # BLD AUTO: 1.9 10E3/UL (ref 0.8–5.3)
LYMPHOCYTES NFR BLD AUTO: 32 %
MCH RBC QN AUTO: 30.8 PG (ref 26.5–33)
MCHC RBC AUTO-ENTMCNC: 32.7 G/DL (ref 31.5–36.5)
MCV RBC AUTO: 94 FL (ref 78–100)
MONOCYTES # BLD AUTO: 0.4 10E3/UL (ref 0–1.3)
MONOCYTES NFR BLD AUTO: 7 %
NEUTROPHILS # BLD AUTO: 3.4 10E3/UL (ref 1.6–8.3)
NEUTROPHILS NFR BLD AUTO: 57 %
PLATELET # BLD AUTO: 260 10E3/UL (ref 150–450)
POTASSIUM SERPL-SCNC: 4.2 MMOL/L (ref 3.4–5.3)
PROT SERPL-MCNC: 7.1 G/DL (ref 6.4–8.3)
RBC # BLD AUTO: 3.64 10E6/UL (ref 3.8–5.2)
SODIUM SERPL-SCNC: 140 MMOL/L (ref 135–145)
TSH SERPL DL<=0.005 MIU/L-ACNC: 2.86 UIU/ML (ref 0.3–4.2)
WBC # BLD AUTO: 6 10E3/UL (ref 4–11)

## 2024-09-05 PROCEDURE — 99214 OFFICE O/P EST MOD 30 MIN: CPT | Performed by: INTERNAL MEDICINE

## 2024-09-05 PROCEDURE — 36415 COLL VENOUS BLD VENIPUNCTURE: CPT | Performed by: INTERNAL MEDICINE

## 2024-09-05 PROCEDURE — 80053 COMPREHEN METABOLIC PANEL: CPT | Performed by: INTERNAL MEDICINE

## 2024-09-05 PROCEDURE — 84443 ASSAY THYROID STIM HORMONE: CPT | Performed by: INTERNAL MEDICINE

## 2024-09-05 PROCEDURE — 83036 HEMOGLOBIN GLYCOSYLATED A1C: CPT | Performed by: INTERNAL MEDICINE

## 2024-09-05 PROCEDURE — G2211 COMPLEX E/M VISIT ADD ON: HCPCS | Performed by: INTERNAL MEDICINE

## 2024-09-05 PROCEDURE — 85025 COMPLETE CBC W/AUTO DIFF WBC: CPT | Performed by: INTERNAL MEDICINE

## 2024-09-05 ASSESSMENT — PAIN SCALES - GENERAL: PAINLEVEL: NO PAIN (0)

## 2024-09-05 NOTE — PROGRESS NOTES
Assessment & Plan     Visit for screening mammogram  declines    Screen for colon cancer  declines    Dizziness  Almost certainly was due to episode of low blood sugar, but still with some ongoing instability, so will perform MRI to ensure no residual CVA findings.   - MR Brain w/o Contrast; Future    Syncope, unspecified syncope type  As above  - MR Brain w/o Contrast; Future    Type 2 diabetes mellitus with hypoglycemia without coma, with long-term current use of insulin (H)  Parameters well controlled.  Continue secondary risk factor modification for BP, cholesterol, anticoagulation, and smoking cessation.   Will back off insulin to 25 units; AM sugars have been near 120.   - Comprehensive metabolic panel (BMP + Alb, Alk Phos, ALT, AST, Total. Bili, TP); Future  - Hemoglobin A1c; Future  - CBC with platelets and differential; Future  - TSH with free T4 reflex; Future    Hypoglycemic episode in patient with diabetes mellitus (H)  As above.                 Alfonso Rodriguez is a 73 year old, presenting for the following health issues:  Stroke Symptoms (9/1/24 - low blood sugars, )        9/5/2024     7:02 AM   Additional Questions   Roomed by CLIFF Green   Accompanied by n/a         9/5/2024     7:02 AM   Patient Reported Additional Medications   Patient reports taking the following new medications n/a     History of Present Illness       Reason for visit:  Followup for EM medical emergency Sunday morning   She is taking medications regularly.     Had not been eating as much as usual.  Went to bed later (1 AM), but could not fall asleep.  [place] herself thrashing around in bed.  Then felt room fading in and out, and heard her night stand and lamp fall over.  Then woke up on floor on her pillow.  Called 911, but could not speak well.  It was 5 AM.  EMT Came in bedroom window.  They did a BG (48).  Was given glucose orally, and almost immediately felt better.   Then ate some bread and honey and drank a pepsi, and  "continued to feel better.      AM blood sugars normally 60-70.    Had been doing 27 units of insulin (not the 30 on her med list.)  So now doing 20 units, then 25 since then.  Now sugars running 120.              Review of Systems  Constitutional, HEENT, cardiovascular, pulmonary, GI, , musculoskeletal, neuro, skin, endocrine and psych systems are negative, except as otherwise noted.      Objective    /84   Pulse 71   Temp 97.5  F (36.4  C) (Tympanic)   Resp 16   Ht 1.727 m (5' 8\")   Wt 71.8 kg (158 lb 3.2 oz)   LMP  (LMP Unknown)   SpO2 99%   Breastfeeding No   BMI 24.05 kg/m    Body mass index is 24.05 kg/m .  Physical Exam   GENERAL: alert and no distress  EYES: Eyes grossly normal to inspection, PERRL and conjunctivae and sclerae normal  HENT: ear canals and TM's normal, nose and mouth without ulcers or lesions  NECK: no adenopathy, no asymmetry, masses, or scars  RESP: lungs clear to auscultation - no rales, rhonchi or wheezes  CV: regular rate and rhythm, normal S1 S2, no S3 or S4, no murmur, click or rub, no peripheral edema  ABDOMEN: soft, nontender, no hepatosplenomegaly, no masses and bowel sounds normal  MS: no gross musculoskeletal defects noted, no edema  SKIN: no suspicious lesions or rashes  NEURO: Normal strength and tone, mentation intact and speech normal  PSYCH: mentation appears normal, affect normal/bright            Signed Electronically by: Andrez Soares MD    "

## 2024-09-05 NOTE — PATIENT INSTRUCTIONS
"Lab work today:  We can do labs in the exam room today, or you can get them done downstairs in the lab.      If you are going downstairs:  Directions:  As you walk through the first floor, you'll see (on the right) first the pharmacy, then some bathrooms, then the \"Lab and Imaging\" area. Give them your name at the window there and wait for them to call you.     They will call you for an MRI.    Keep your insulin at 25 units for now.      Andrez Soares MD  Internal Medicine and Pediatrics   "

## 2024-09-05 NOTE — LETTER
September 6, 2024      Jennifer Presley  [address] [place] [address]        Jennifer,     Your thyroid stimulating hormone (TSH), complete blood count, and diabetes blood test (A1c) looked great.  Your electrolytes, liver, and kidney panels looked mostly okay, but the kidney function has dropped a bit, with a higher creatinine.  I'd like to make sure you stay hydrated and have this repeated in another 3 months.  You can schedule this by calling  or using your [employer] account, if you have it.     It was good seeing you in the office.  Hope you have a great rest of the day!     Andrez Soares MD   Internal Medicine and Pediatrics       Resulted Orders   Comprehensive metabolic panel (BMP + Alb, Alk Phos, ALT, AST, Total. Bili, TP)   Result Value Ref Range    Sodium 140 135 - 145 mmol/L    Potassium 4.2 3.4 - 5.3 mmol/L    Carbon Dioxide (CO2) 24 22 - 29 mmol/L    Anion Gap 10 7 - 15 mmol/L    Urea Nitrogen 22.4 8.0 - 23.0 mg/dL    Creatinine 1.06 (H) 0.51 - 0.95 mg/dL    GFR Estimate 56 (L) >60 mL/min/1.73m2      Comment:      eGFR calculated using 2021 CKD-EPI equation.    Calcium 9.4 8.8 - 10.4 mg/dL      Comment:      Reference intervals for this test were updated on 7/16/2024 to reflect our healthy population more accurately. There may be differences in the flagging of prior results with similar values performed with this method. Those prior results can be interpreted in the context of the updated reference intervals.    Chloride 106 98 - 107 mmol/L    Glucose 168 (H) 70 - 99 mg/dL    Alkaline Phosphatase 64 40 - 150 U/L    AST 24 0 - 45 U/L    ALT 16 0 - 50 U/L    Protein Total 7.1 6.4 - 8.3 g/dL    Albumin 4.2 3.5 - 5.2 g/dL    Bilirubin Total 0.4 <=1.2 mg/dL   Hemoglobin A1c   Result Value Ref Range    Hemoglobin A1C 6.4 (H) 0.0 - 5.6 %      Comment:      Normal <5.7%   Prediabetes 5.7-6.4%    Diabetes 6.5% or higher     Note: Adopted from ADA consensus guidelines.   TSH with free T4  reflex   Result Value Ref Range    TSH 2.86 0.30 - 4.20 uIU/mL   CBC with platelets and differential   Result Value Ref Range    WBC Count 6.0 4.0 - 11.0 10e3/uL    RBC Count 3.64 (L) 3.80 - 5.20 10e6/uL    Hemoglobin 11.2 (L) 11.7 - 15.7 g/dL    Hematocrit 34.2 (L) 35.0 - 47.0 %    MCV 94 78 - 100 fL    MCH 30.8 26.5 - 33.0 pg    MCHC 32.7 31.5 - 36.5 g/dL    RDW 12.9 10.0 - 15.0 %    Platelet Count 260 150 - 450 10e3/uL    % Neutrophils 57 %    % Lymphocytes 32 %    % Monocytes 7 %    % Eosinophils 4 %    % Basophils 1 %    % Immature Granulocytes 0 %    Absolute Neutrophils 3.4 1.6 - 8.3 10e3/uL    Absolute Lymphocytes 1.9 0.8 - 5.3 10e3/uL    Absolute Monocytes 0.4 0.0 - 1.3 10e3/uL    Absolute Eosinophils 0.2 0.0 - 0.7 10e3/uL    Absolute Basophils 0.0 0.0 - 0.2 10e3/uL    Absolute Immature Granulocytes 0.0 <=0.4 10e3/uL

## 2024-09-20 ENCOUNTER — PATIENT OUTREACH (OUTPATIENT)
Dept: CARE COORDINATION | Facility: CLINIC | Age: 74
End: 2024-09-20
Payer: COMMERCIAL

## 2024-09-23 ENCOUNTER — HOSPITAL ENCOUNTER (OUTPATIENT)
Dept: MRI IMAGING | Facility: CLINIC | Age: 74
Discharge: HOME | End: 2024-09-23
Attending: INTERNAL MEDICINE | Admitting: INTERNAL MEDICINE
Payer: COMMERCIAL

## 2024-09-23 DIAGNOSIS — R42 DIZZINESS: ICD-10-CM

## 2024-09-23 DIAGNOSIS — R55 SYNCOPE, UNSPECIFIED SYNCOPE TYPE: ICD-10-CM

## 2024-09-23 PROCEDURE — 70551 MRI BRAIN STEM W/O DYE: CPT

## 2024-11-01 ENCOUNTER — TRANSFERRED RECORDS (OUTPATIENT)
Dept: MULTI SPECIALTY CLINIC | Facility: CLINIC | Age: 74
End: 2024-11-01

## 2024-11-01 LAB — RETINOPATHY: NORMAL

## 2024-12-30 ENCOUNTER — LAB (OUTPATIENT)
Dept: LAB | Facility: CLINIC | Age: 74
End: 2024-12-30
Payer: COMMERCIAL

## 2024-12-30 DIAGNOSIS — E11.649 TYPE 2 DIABETES MELLITUS WITH HYPOGLYCEMIA WITHOUT COMA, WITH LONG-TERM CURRENT USE OF INSULIN (H): ICD-10-CM

## 2024-12-30 DIAGNOSIS — Z79.4 TYPE 2 DIABETES MELLITUS WITH HYPOGLYCEMIA WITHOUT COMA, WITH LONG-TERM CURRENT USE OF INSULIN (H): ICD-10-CM

## 2024-12-30 LAB
ANION GAP SERPL CALCULATED.3IONS-SCNC: 11 MMOL/L (ref 7–15)
BUN SERPL-MCNC: 31.7 MG/DL (ref 8–23)
CALCIUM SERPL-MCNC: 9.5 MG/DL (ref 8.8–10.4)
CHLORIDE SERPL-SCNC: 107 MMOL/L (ref 98–107)
CREAT SERPL-MCNC: 1.36 MG/DL (ref 0.51–0.95)
EGFRCR SERPLBLD CKD-EPI 2021: 41 ML/MIN/1.73M2
GLUCOSE SERPL-MCNC: 139 MG/DL (ref 70–99)
HCO3 SERPL-SCNC: 24 MMOL/L (ref 22–29)
POTASSIUM SERPL-SCNC: 4.1 MMOL/L (ref 3.4–5.3)
SODIUM SERPL-SCNC: 142 MMOL/L (ref 135–145)

## 2024-12-30 PROCEDURE — 80048 BASIC METABOLIC PNL TOTAL CA: CPT

## 2024-12-30 PROCEDURE — 36415 COLL VENOUS BLD VENIPUNCTURE: CPT

## 2025-01-02 ENCOUNTER — TELEPHONE (OUTPATIENT)
Dept: PEDIATRICS | Facility: CLINIC | Age: 75
End: 2025-01-02
Payer: COMMERCIAL

## 2025-01-02 NOTE — TELEPHONE ENCOUNTER
Received a call back from patient. Informed of provider message regarding recent lab. Patient has upcoming visit with provider on 1/06/25. Advised patient to come well hydrated to visit. Discussed recommended daily water consumption with patient.   Valeri MARTINS RN, BSN  Clinic RN  [facility]

## 2025-01-02 NOTE — TELEPHONE ENCOUNTER
----- Message from Leyla Hancock sent at 1/2/2025 12:23 PM CST -----  Covering for Dr. Soares. Her kidney function has worsened since last check.  Has she had any changes to her meds? Any recent illness? Was she well hydrated?  Will need to come to her upcoming visit with Dr. Soares well hydrated so he can recheck.  Please route back to PCP when you discuss with pt.  Thanks  Leyla Hancock, APRN, CNP

## 2025-01-02 NOTE — TELEPHONE ENCOUNTER
Called and left voice message for patient to call [phone] and ask to speak to a nurse. Patient does not have MyChart.    Upon call back please  -relay provider message regarding labs  -inquire about medications or illnesses and hydration  -reinforce needs to come to OV 1/6/25 well hydrated    1/6/2025 11:00 AM (Arrive by 10:40 AM) Andrez Soares MD [facility] GIRALDO     Awaiting call back at this time.    Jewell Ambriz, RN

## 2025-01-09 ENCOUNTER — OFFICE VISIT (OUTPATIENT)
Dept: PEDIATRICS | Facility: CLINIC | Age: 75
End: 2025-01-09
Attending: INTERNAL MEDICINE
Payer: COMMERCIAL

## 2025-01-09 VITALS
TEMPERATURE: 97.9 F | HEIGHT: 68 IN | WEIGHT: 162.9 LBS | DIASTOLIC BLOOD PRESSURE: 62 MMHG | BODY MASS INDEX: 24.69 KG/M2 | RESPIRATION RATE: 14 BRPM | SYSTOLIC BLOOD PRESSURE: 126 MMHG | HEART RATE: 84 BPM | OXYGEN SATURATION: 99 %

## 2025-01-09 DIAGNOSIS — Z12.11 SCREEN FOR COLON CANCER: ICD-10-CM

## 2025-01-09 DIAGNOSIS — Z00.00 ENCOUNTER FOR MEDICARE ANNUAL WELLNESS EXAM: Primary | ICD-10-CM

## 2025-01-09 DIAGNOSIS — E78.5 HYPERLIPIDEMIA LDL GOAL <100: ICD-10-CM

## 2025-01-09 DIAGNOSIS — E11.65 TYPE 2 DIABETES MELLITUS WITH HYPERGLYCEMIA, WITHOUT LONG-TERM CURRENT USE OF INSULIN (H): ICD-10-CM

## 2025-01-09 DIAGNOSIS — Z79.4 TYPE 2 DIABETES MELLITUS WITH DIABETIC POLYNEUROPATHY, WITH LONG-TERM CURRENT USE OF INSULIN (H): ICD-10-CM

## 2025-01-09 DIAGNOSIS — N18.32 STAGE 3B CHRONIC KIDNEY DISEASE (H): ICD-10-CM

## 2025-01-09 DIAGNOSIS — E11.42 TYPE 2 DIABETES MELLITUS WITH DIABETIC POLYNEUROPATHY, WITH LONG-TERM CURRENT USE OF INSULIN (H): ICD-10-CM

## 2025-01-09 DIAGNOSIS — Z12.31 VISIT FOR SCREENING MAMMOGRAM: ICD-10-CM

## 2025-01-09 LAB
ALBUMIN SERPL BCG-MCNC: 4.2 G/DL (ref 3.5–5.2)
ALP SERPL-CCNC: 82 U/L (ref 40–150)
ALT SERPL W P-5'-P-CCNC: 16 U/L (ref 0–50)
ANION GAP SERPL CALCULATED.3IONS-SCNC: 11 MMOL/L (ref 7–15)
AST SERPL W P-5'-P-CCNC: 24 U/L (ref 0–45)
BILIRUB SERPL-MCNC: 0.3 MG/DL
BUN SERPL-MCNC: 26.9 MG/DL (ref 8–23)
CALCIUM SERPL-MCNC: 9.6 MG/DL (ref 8.8–10.4)
CHLORIDE SERPL-SCNC: 106 MMOL/L (ref 98–107)
CHOLEST SERPL-MCNC: 186 MG/DL
CREAT SERPL-MCNC: 1.28 MG/DL (ref 0.51–0.95)
CREAT UR-MCNC: 139 MG/DL
EGFRCR SERPLBLD CKD-EPI 2021: 44 ML/MIN/1.73M2
EST. AVERAGE GLUCOSE BLD GHB EST-MCNC: 163 MG/DL
FASTING STATUS PATIENT QL REPORTED: YES
FASTING STATUS PATIENT QL REPORTED: YES
GLUCOSE SERPL-MCNC: 132 MG/DL (ref 70–99)
HBA1C MFR BLD: 7.3 % (ref 0–5.6)
HCO3 SERPL-SCNC: 26 MMOL/L (ref 22–29)
HDLC SERPL-MCNC: 48 MG/DL
LDLC SERPL CALC-MCNC: 123 MG/DL
MICROALBUMIN UR-MCNC: 53 MG/L
MICROALBUMIN/CREAT UR: 38.13 MG/G CR (ref 0–25)
NONHDLC SERPL-MCNC: 138 MG/DL
POTASSIUM SERPL-SCNC: 4.8 MMOL/L (ref 3.4–5.3)
PROT SERPL-MCNC: 7.2 G/DL (ref 6.4–8.3)
SODIUM SERPL-SCNC: 143 MMOL/L (ref 135–145)
TRIGL SERPL-MCNC: 74 MG/DL

## 2025-01-09 RX ORDER — ATORVASTATIN CALCIUM 40 MG/1
40 TABLET, FILM COATED ORAL DAILY
Qty: 90 TABLET | Refills: 3 | Status: SHIPPED | OUTPATIENT
Start: 2025-01-09

## 2025-01-09 RX ORDER — INSULIN GLARGINE 100 [IU]/ML
25 INJECTION, SOLUTION SUBCUTANEOUS AT BEDTIME
Qty: 30 ML | Refills: 3 | Status: SHIPPED | OUTPATIENT
Start: 2025-01-09

## 2025-01-09 RX ORDER — LISINOPRIL 10 MG/1
10 TABLET ORAL DAILY
Qty: 90 TABLET | Refills: 3 | Status: SHIPPED | OUTPATIENT
Start: 2025-01-09

## 2025-01-09 SDOH — HEALTH STABILITY: PHYSICAL HEALTH: ON AVERAGE, HOW MANY MINUTES DO YOU ENGAGE IN EXERCISE AT THIS LEVEL?: 10 MIN

## 2025-01-09 SDOH — HEALTH STABILITY: PHYSICAL HEALTH: ON AVERAGE, HOW MANY DAYS PER WEEK DO YOU ENGAGE IN MODERATE TO STRENUOUS EXERCISE (LIKE A BRISK WALK)?: 0 DAYS

## 2025-01-09 ASSESSMENT — SOCIAL DETERMINANTS OF HEALTH (SDOH): HOW OFTEN DO YOU GET TOGETHER WITH FRIENDS OR RELATIVES?: NEVER

## 2025-01-09 NOTE — PATIENT INSTRUCTIONS
"Lab work today:  We can do labs in the exam room today, or you can get them done downstairs in the lab.      If you are going downstairs:  Directions:  As you walk through the first floor, you'll see (on the right) first the pharmacy, then some bathrooms, then the \"Lab and Imaging\" area. Give them your name at the window there and wait for them to call you.     Urinalysis today.    Let's begin a low dose of a kidney protecting called lisinopril 10 mg.    If your kidney is worsening, we should also do an ultrasoudn of the kidney.     a FIT test (stool test for hidden blood) in our lab downstairs and return it.  If abnormal, we'll have to figure out a way to get your colonoscopy done.    No changes in meds.         Patient Education   Preventive Care Advice   This is general advice given by our system to help you stay healthy. However, your care team may have specific advice just for you. Please talk to your care team about your preventive care needs.  Nutrition  Eat 5 or more servings of fruits and vegetables each day.  Try wheat bread, brown rice and whole grain pasta (instead of white bread, rice, and pasta).  Get enough calcium and vitamin D. Check the label on foods and aim for 100% of the RDA (recommended daily allowance).  Lifestyle  Exercise at least 150 minutes each week  (30 minutes a day, 5 days a week).  Do muscle strengthening activities 2 days a week. These help control your weight and prevent disease.  No smoking.  Wear sunscreen to prevent skin cancer.  Have a dental exam and cleaning every 6 months.  Yearly exams  See your health care team every year to talk about:  Any changes in your health.  Any medicines your care team has prescribed.  Preventive care, family planning, and ways to prevent chronic diseases.  Shots (vaccines)   HPV shots (up to age 26), if you've never had them before.  Hepatitis B shots (up to age 59), if you've never had them before.  COVID-19 shot: Get this shot when it's " due.  Flu shot: Get a flu shot every year.  Tetanus shot: Get a tetanus shot every 10 years.  Pneumococcal, hepatitis A, and RSV shots: Ask your care team if you need these based on your risk.  Shingles shot (for age 50 and up)  General health tests  Diabetes screening:  Starting at age 35, Get screened for diabetes at least every 3 years.  If you are younger than age 35, ask your care team if you should be screened for diabetes.  Cholesterol test: At age 39, start having a cholesterol test every 5 years, or more often if advised.  Bone density scan (DEXA): At age 50, ask your care team if you should have this scan for osteoporosis (brittle bones).  Hepatitis C: Get tested at least once in your life.  STIs (sexually transmitted infections)  Before age 24: Ask your care team if you should be screened for STIs.  After age 24: Get screened for STIs if you're at risk. You are at risk for STIs (including HIV) if:  You are sexually active with more than one person.  You don't use condoms every time.  You or a partner was diagnosed with a sexually transmitted infection.  If you are at risk for HIV, ask about PrEP medicine to prevent HIV.  Get tested for HIV at least once in your life, whether you are at risk for HIV or not.  Cancer screening tests  Cervical cancer screening: If you have a cervix, begin getting regular cervical cancer screening tests starting at age 21.  Breast cancer scan (mammogram): If you've ever had breasts, begin having regular mammograms starting at age 40. This is a scan to check for breast cancer.  Colon cancer screening: It is important to start screening for colon cancer at age 45.  Have a colonoscopy test every 10 years (or more often if you're at risk) Or, ask your provider about stool tests like a FIT test every year or Cologuard test every 3 years.  To learn more about your testing options, visit:   .  For help making a decision, visit:   https://bit.ly/ze22389.  Prostate cancer screening  test: If you have a prostate, ask your care team if a prostate cancer screening test (PSA) at age 55 is right for you.  Lung cancer screening: If you are a current or former smoker ages 50 to 80, ask your care team if ongoing lung cancer screenings are right for you.  For informational purposes only. Not to replace the advice of your health care provider. Copyright   2023 [facility]. All rights reserved. Clinically reviewed by the  [facility]. [employer] 000616 - REV 01/24.  Preventing Falls: Care Instructions  Injuries and health problems such as trouble walking or poor eyesight can increase your risk of falling. So can some medicines. But there are things you can do to help prevent falls. You can exercise to get stronger. You can also arrange your home to make it safer.    Talk to your doctor about the medicines you take. Ask if any of them increase the risk of falls and whether they can be changed or stopped.   Try to exercise regularly. It can help improve your strength and balance. This can help lower your risk of falling.         Practice fall safety and prevention.   Wear low-heeled shoes that fit well and give your feet good support. Talk to your doctor if you have foot problems that make this hard.  Carry a cellphone or wear a medical alert device that you can use to call for help.  Use stepladders instead of chairs to reach high objects. Don't climb if you're at risk for falls. Ask for help, if needed.  Wear the correct eyeglasses, if you need them.        Make your home safer.   Remove rugs, cords, clutter, and furniture from walkways.  Keep your house well lit. Use night-lights in hallways and bathrooms.  Install and use sturdy handrails on stairways.  Wear nonskid footwear, even inside. Don't walk barefoot or in socks without shoes.        Be safe outside.   Use handrails, curb cuts, and ramps whenever possible.  Keep your hands free by using a shoulder bag or  "backpack.  Try to walk in well-lit areas. Watch out for uneven ground, changes in pavement, and debris.  Be careful in the winter. Walk on the grass or gravel when sidewalks are slippery. Use de-icer on steps and walkways. Add non-slip devices to shoes.    Put grab bars and nonskid mats in your shower or tub and near the toilet. Try to use a shower chair or bath bench when bathing.   Get into a tub or shower by putting in your weaker leg first. Get out with your strong side first. Have a phone or medical alert device in the bathroom with you.   Where can you learn more?  Go to https://www.[employer].net/patiented  Enter G117 in the search box to learn more about \"Preventing Falls: Care Instructions.\"  Current as of: July 31, 2024  Content Version: 14.3    2024 [employer].   Care instructions adapted under license by your healthcare professional. If you have questions about a medical condition or this instruction, always ask your healthcare professional. [employer] disclaims any warranty or liability for your use of this information.       "

## 2025-06-09 ENCOUNTER — PATIENT OUTREACH (OUTPATIENT)
Dept: CARE COORDINATION | Facility: CLINIC | Age: 75
End: 2025-06-09
Payer: COMMERCIAL

## 2025-07-08 DIAGNOSIS — E11.65 TYPE 2 DIABETES MELLITUS WITH HYPERGLYCEMIA, WITHOUT LONG-TERM CURRENT USE OF INSULIN (H): ICD-10-CM

## 2025-07-10 ENCOUNTER — OFFICE VISIT (OUTPATIENT)
Dept: PEDIATRICS | Facility: CLINIC | Age: 75
End: 2025-07-10
Payer: COMMERCIAL

## 2025-07-10 VITALS
RESPIRATION RATE: 16 BRPM | OXYGEN SATURATION: 99 % | DIASTOLIC BLOOD PRESSURE: 68 MMHG | WEIGHT: 169 LBS | HEIGHT: 68 IN | BODY MASS INDEX: 25.61 KG/M2 | HEART RATE: 75 BPM | SYSTOLIC BLOOD PRESSURE: 138 MMHG | TEMPERATURE: 97.6 F

## 2025-07-10 DIAGNOSIS — Z79.4 TYPE 2 DIABETES MELLITUS WITH DIABETIC POLYNEUROPATHY, WITH LONG-TERM CURRENT USE OF INSULIN (H): Primary | ICD-10-CM

## 2025-07-10 DIAGNOSIS — I10 HYPERTENSION GOAL BP (BLOOD PRESSURE) < 140/90: ICD-10-CM

## 2025-07-10 DIAGNOSIS — N18.32 STAGE 3B CHRONIC KIDNEY DISEASE (H): ICD-10-CM

## 2025-07-10 DIAGNOSIS — M81.0 OSTEOPOROSIS, UNSPECIFIED OSTEOPOROSIS TYPE, UNSPECIFIED PATHOLOGICAL FRACTURE PRESENCE: ICD-10-CM

## 2025-07-10 DIAGNOSIS — Z12.11 SCREEN FOR COLON CANCER: ICD-10-CM

## 2025-07-10 DIAGNOSIS — E11.42 TYPE 2 DIABETES MELLITUS WITH DIABETIC POLYNEUROPATHY, WITH LONG-TERM CURRENT USE OF INSULIN (H): Primary | ICD-10-CM

## 2025-07-10 DIAGNOSIS — Z82.49 FAMILY HISTORY OF CORONARY ARTERIOSCLEROSIS: ICD-10-CM

## 2025-07-10 DIAGNOSIS — E78.5 HYPERLIPIDEMIA LDL GOAL <100: ICD-10-CM

## 2025-07-10 LAB
EST. AVERAGE GLUCOSE BLD GHB EST-MCNC: 157 MG/DL
HBA1C MFR BLD: 7.1 % (ref 0–5.6)

## 2025-07-10 RX ORDER — INFLUENZA A VIRUS A/VICTORIA/4897/2022 IVR-238 (H1N1) ANTIGEN (FORMALDEHYDE INACTIVATED), INFLUENZA A VIRUS A/CALIFORNIA/122/2022 SAN-022 (H3N2) ANTIGEN (FORMALDEHYDE INACTIVATED), AND INFLUENZA B VIRUS B/MICHIGAN/01/2021 ANTIGEN (FORMALDEHYDE INACTIVATED) 60; 60; 60 UG/.5ML; UG/.5ML; UG/.5ML
INJECTION, SUSPENSION INTRAMUSCULAR
COMMUNITY
Start: 2024-09-28 | End: 2025-07-10

## 2025-07-10 RX ORDER — COVID-19 VACCINE, MRNA 0.04 MG/.418ML
INJECTION, SUSPENSION INTRAMUSCULAR
COMMUNITY
Start: 2024-09-28 | End: 2025-07-10

## 2025-07-10 ASSESSMENT — PAIN SCALES - GENERAL: PAINLEVEL_OUTOF10: NO PAIN (0)

## 2025-07-10 NOTE — PROGRESS NOTES
Assessment & Plan     Screen for colon cancer      Type 2 diabetes mellitus with diabetic polyneuropathy, with long-term current use of insulin (H)  Parameters well controlled.  Continue secondary risk factor modification for BP, cholesterol, anticoagulation, and smoking cessation.   However, not taking meds regularly.  Discussed her concern about family history  of coronary artery disease and about how best strategy is to minimize her modifiable risk factors.   - HEMOGLOBIN A1C; Future  - CT Coronary Calcium Scan; Future  - CRP cardiac risk; Future  - Comprehensive metabolic panel (BMP + Alb, Alk Phos, ALT, AST, Total. Bili, TP); Future  - HEMOGLOBIN A1C  - CRP cardiac risk  - Comprehensive metabolic panel (BMP + Alb, Alk Phos, ALT, AST, Total. Bili, TP)    Stage 3b chronic kidney disease (H)  Secondary risk factor modification.   - Albumin Random Urine Quantitative with Creat Ratio; Future  - Comprehensive metabolic panel (BMP + Alb, Alk Phos, ALT, AST, Total. Bili, TP); Future  - Albumin Random Urine Quantitative with Creat Ratio  - Comprehensive metabolic panel (BMP + Alb, Alk Phos, ALT, AST, Total. Bili, TP)    HYPERLIPIDEMIA LDL GOAL <100    - CT Coronary Calcium Scan; Future  - CRP cardiac risk; Future  - CRP cardiac risk    Hypertension goal BP (blood pressure) < 140/90  At goal.   - CT Coronary Calcium Scan; Future  - CRP cardiac risk; Future  - CRP cardiac risk    Osteoporosis, unspecified osteoporosis type, unspecified pathological fracture presence  Due.  Declines fosamax.   - DX Bone Density; Future    Family history of coronary arteriosclerosis    However, not taking meds regularly.  Discussed her concern about family history  of coronary artery disease and about how best strategy is to minimize her modifiable risk factors.   - CT Coronary Calcium Scan; Future  - CRP cardiac risk; Future  - CRP cardiac risk    BMI  Estimated body mass index is 25.7 kg/m  as calculated from the following:    Height as  "of this encounter: 1.727 m (5' 8\").    Weight as of this encounter: 76.7 kg (169 lb).   Weight management plan: Discussed healthy diet and exercise guidelines  Blood sugar testing frequency justification:  Patient modifying lifestyle changes (diet, exercise) with blood sugars      Alfonso Rodriguez is a 74 year old, presenting for the following health issues:  Recheck Medication      7/10/2025     1:05 PM   Additional Questions   Roomed by Jeny Henry CMA   Accompanied by n/a         7/10/2025     1:05 PM   Patient Reported Additional Medications   Patient reports taking the following new medications N/A     0000    History of Present Illness       Diabetes:   She presents for follow up of diabetes.  She is checking home blood glucose two times daily.   She checks blood glucose after meals and at bedtime.  Blood glucose is sometimes over 200 and sometimes under 70. She is aware of hypoglycemia symptoms including other.    She has no concerns regarding her diabetes at this time.  She is having numbness in feet.            Reason for visit:  Neuropathy    She eats 0-1 servings of fruits and vegetables daily.She consumes 0 sweetened beverage(s) daily.She exercises with enough effort to increase her heart rate 9 or less minutes per day.  She exercises with enough effort to increase her heart rate 3 or less days per week.   She is taking medications regularly.        Brother with LADcoronary disease; \"4.5 score\" with blockage.  Mom with MI at age 60.      Has high Cholesterol, hypertension, and diabetes mellitus.    Very sedentary. Can push cart around grocery store.      Taking lantus 25 units daily;  did not want to go up to 30 units due to                Review of Systems  Constitutional, HEENT, cardiovascular, pulmonary, GI, , musculoskeletal, neuro, skin, endocrine and psych systems are negative, except as otherwise noted.      Objective    /68 (BP Location: Right arm, Patient Position: Sitting, Cuff " "Size: Adult Large)   Pulse 75   Temp 97.6  F (36.4  C) (Oral)   Resp 16   Ht 1.727 m (5' 8\")   Wt 76.7 kg (169 lb)   LMP  (LMP Unknown)   SpO2 99%   BMI 25.70 kg/m    Body mass index is 25.7 kg/m .  Physical Exam   GENERAL: alert and no distress  EYES: Eyes grossly normal to inspection, PERRL and conjunctivae and sclerae normal  HENT: ear canals and TM's normal, nose and mouth without ulcers or lesions  NECK: no adenopathy, no asymmetry, masses, or scars  RESP: lungs clear to auscultation - no rales, rhonchi or wheezes  CV: regular rate and rhythm, normal S1 S2, no S3 or S4, no murmur, click or rub, no peripheral edema  ABDOMEN: soft, nontender, no hepatosplenomegaly, no masses and bowel sounds normal  MS: no gross musculoskeletal defects noted, no edema  SKIN: no suspicious lesions or rashes  NEURO: Normal strength and tone, mentation intact and speech normal  PSYCH: mentation appears normal, affect normal/bright  Diabetic foot exam: normal DP and PT pulses, abnlroma light touch bilaterarlly            Signed Electronically by: Andrez Soares MD    "

## 2025-07-10 NOTE — PATIENT INSTRUCTIONS
"Lab work today:  We can do labs in the exam room today, or you can get them done downstairs in the lab.      If you are going downstairs:  Directions:  As you walk through the first floor, you'll see (on the right) first the pharmacy, then some bathrooms, then the \"Lab and Imaging\" area. Give them your name at the window there and wait for them to call you.     They will call you for a CT of your heart arteries.    Try to take all your meds daily (to prevent heart disease.)    They will call you for a DEXA scan (bone density test for osteoporosis) as well.    Andrez Soares MD  Internal Medicine and Pediatrics     "

## 2025-07-10 NOTE — PROGRESS NOTES
The longitudinal plan of care for the diagnosis(es)/condition(s) as documented were addressed during this visit. Due to the added complexity in care, I will continue to support Jennifer in the subsequent management and with ongoing continuity of care.  Answers submitted by the patient for this visit:  Diabetes Visit (Submitted on 7/10/2025)  Chief Complaint: Chronic problems general questions HPI Form  Frequency of checking blood sugars:: two times daily  What time of day are you checking your blood sugars : after meals, at bedtime  Have you had any blood sugars above 200?: Yes  Have you had any blood sugars below 70?: Yes  Hypoglycemia symptoms:: other  Diabetic concerns:: none  Paraesthesia present:: numbness in feet  General Questionnaire (Submitted on 7/10/2025)  Chief Complaint: Chronic problems general questions HPI Form  What is the reason for your visit today? : neuropathy  How many servings of fruits and vegetables do you eat daily?: 0-1  On average, how many sweetened beverages do you drink each day (Examples: soda, juice, sweet tea, etc.  Do NOT count diet or artificially sweetened beverages)?: 0  How many minutes a day do you exercise enough to make your heart beat faster?: 9 or less  How many days a week do you exercise enough to make your heart beat faster?: 3 or less  How many days per week do you miss taking your medication?: 0  Questionnaire about: Chronic problems general questions HPI Form (Submitted on 7/10/2025)  Chief Complaint: Chronic problems general questions HPI Form

## 2025-07-28 DIAGNOSIS — E11.65 TYPE 2 DIABETES MELLITUS WITH HYPERGLYCEMIA, WITHOUT LONG-TERM CURRENT USE OF INSULIN (H): ICD-10-CM

## 2025-08-26 DIAGNOSIS — E11.65 TYPE 2 DIABETES MELLITUS WITH HYPERGLYCEMIA, WITHOUT LONG-TERM CURRENT USE OF INSULIN (H): ICD-10-CM

## (undated) DEVICE — GLOVE BIOGEL PI SZ 8.0 40880

## (undated) DEVICE — PREP CHLORAPREP 26ML TINTED HI-LITE ORANGE 930815

## (undated) DEVICE — DRSG ABDOMINAL 07 1/2X8" 7197D

## (undated) DEVICE — SU MONOCRYL 2-0 SH 27" UND Y417H

## (undated) DEVICE — GLOVE BIOGEL PI MICRO INDICATOR UNDERGLOVE SZ 8.0 48980

## (undated) DEVICE — ADH SKIN CLOSURE PREMIERPRO EXOFIN MICOR HV 0.5ML 3471

## (undated) DEVICE — SU MONOCRYL 3-0 PS-2 27" Y427H

## (undated) DEVICE — PACK HIP NAILING SOP32HPFC4

## (undated) DEVICE — GLOVE BIOGEL PI MICRO INDICATOR UNDERGLOVE SZ 7.5 48975

## (undated) DEVICE — DRAPE X-RAY TUBE 00-901169-01-OEC

## (undated) DEVICE — LINEN TOWEL PACK X10 5473

## (undated) DEVICE — GLOVE BIOGEL PI SZ 7.5 40875

## (undated) DEVICE — DRILL BIT QUICK COUPLING CAN 5.0X300MM 310.63

## (undated) DEVICE — CAST PADDING 4" UNSTERILE 9044

## (undated) DEVICE — DRAPE C-ARM 60X42" 1013

## (undated) DEVICE — WIRE GUIDE 2.8X300MM NON-THRD W/FLUTES 292.81

## (undated) DEVICE — Device

## (undated) DEVICE — GOWN XXL 9575

## (undated) RX ORDER — TRANEXAMIC ACID 10 MG/ML
INJECTION, SOLUTION INTRAVENOUS
Status: DISPENSED
Start: 2023-01-31

## (undated) RX ORDER — DEXAMETHASONE SODIUM PHOSPHATE 4 MG/ML
INJECTION, SOLUTION INTRA-ARTICULAR; INTRALESIONAL; INTRAMUSCULAR; INTRAVENOUS; SOFT TISSUE
Status: DISPENSED
Start: 2023-01-31

## (undated) RX ORDER — GLYCOPYRROLATE 0.2 MG/ML
INJECTION INTRAMUSCULAR; INTRAVENOUS
Status: DISPENSED
Start: 2023-01-31

## (undated) RX ORDER — LIDOCAINE HYDROCHLORIDE 10 MG/ML
INJECTION, SOLUTION EPIDURAL; INFILTRATION; INTRACAUDAL; PERINEURAL
Status: DISPENSED
Start: 2023-01-31

## (undated) RX ORDER — FENTANYL CITRATE-0.9 % NACL/PF 10 MCG/ML
PLASTIC BAG, INJECTION (ML) INTRAVENOUS
Status: DISPENSED
Start: 2023-01-31

## (undated) RX ORDER — FENTANYL CITRATE 50 UG/ML
INJECTION, SOLUTION INTRAMUSCULAR; INTRAVENOUS
Status: DISPENSED
Start: 2023-01-31

## (undated) RX ORDER — CEFAZOLIN SODIUM/WATER 2 G/20 ML
SYRINGE (ML) INTRAVENOUS
Status: DISPENSED
Start: 2023-01-31

## (undated) RX ORDER — ONDANSETRON 2 MG/ML
INJECTION INTRAMUSCULAR; INTRAVENOUS
Status: DISPENSED
Start: 2023-01-31

## (undated) RX ORDER — BUPIVACAINE HYDROCHLORIDE AND EPINEPHRINE 5; 5 MG/ML; UG/ML
INJECTION, SOLUTION EPIDURAL; INTRACAUDAL; PERINEURAL
Status: DISPENSED
Start: 2023-01-31

## (undated) RX ORDER — PROPOFOL 10 MG/ML
INJECTION, EMULSION INTRAVENOUS
Status: DISPENSED
Start: 2023-01-31

## (undated) RX ORDER — NEOSTIGMINE METHYLSULFATE 1 MG/ML
VIAL (ML) INJECTION
Status: DISPENSED
Start: 2023-01-31